# Patient Record
Sex: FEMALE | Race: WHITE | Employment: OTHER | URBAN - METROPOLITAN AREA
[De-identification: names, ages, dates, MRNs, and addresses within clinical notes are randomized per-mention and may not be internally consistent; named-entity substitution may affect disease eponyms.]

---

## 2018-02-15 ENCOUNTER — HOSPITAL ENCOUNTER (OUTPATIENT)
Dept: MAMMOGRAPHY | Age: 78
Discharge: HOME | End: 2018-02-15
Admitting: FAMILY MEDICINE

## 2018-02-15 DIAGNOSIS — Z12.31 ENCOUNTER FOR SCREENING MAMMOGRAM FOR BREAST CANCER: ICD-10-CM

## 2019-02-27 ENCOUNTER — HOSPITAL ENCOUNTER (OUTPATIENT)
Dept: MAMMOGRAPHY | Age: 79
Discharge: HOME | End: 2019-02-27
Payer: MEDICARE

## 2019-02-27 DIAGNOSIS — Z12.39 BREAST CANCER SCREENING: ICD-10-CM

## 2019-02-27 PROCEDURE — 77063 BREAST TOMOSYNTHESIS BI: CPT

## 2019-02-28 ENCOUNTER — TELEPHONE (OUTPATIENT)
Dept: CARDIOLOGY CLINIC | Age: 79
End: 2019-02-28

## 2019-02-28 ENCOUNTER — OFFICE VISIT (OUTPATIENT)
Dept: CARDIOLOGY CLINIC | Age: 79
End: 2019-02-28
Payer: MEDICARE

## 2019-02-28 VITALS
SYSTOLIC BLOOD PRESSURE: 120 MMHG | DIASTOLIC BLOOD PRESSURE: 82 MMHG | RESPIRATION RATE: 16 BRPM | WEIGHT: 160 LBS | BODY MASS INDEX: 26.66 KG/M2 | HEIGHT: 65 IN

## 2019-02-28 DIAGNOSIS — R94.31 ABNORMAL EKG: ICD-10-CM

## 2019-02-28 DIAGNOSIS — I10 ESSENTIAL HYPERTENSION: ICD-10-CM

## 2019-02-28 DIAGNOSIS — Z01.810 PRE-OPERATIVE CARDIOVASCULAR EXAMINATION: Primary | ICD-10-CM

## 2019-02-28 PROCEDURE — 99214 OFFICE O/P EST MOD 30 MIN: CPT | Performed by: INTERNAL MEDICINE

## 2019-02-28 RX ORDER — ESCITALOPRAM OXALATE 10 MG/1
10 TABLET ORAL DAILY
COMMUNITY

## 2019-02-28 RX ORDER — MIRTAZAPINE 15 MG/1
7.5 TABLET, FILM COATED ORAL NIGHTLY
Status: ON HOLD | COMMUNITY
End: 2022-10-27 | Stop reason: HOSPADM

## 2019-02-28 RX ORDER — LATANOPROST 50 UG/ML
1 SOLUTION/ DROPS OPHTHALMIC NIGHTLY
COMMUNITY

## 2019-03-15 ENCOUNTER — HOSPITAL ENCOUNTER (OUTPATIENT)
Dept: NON INVASIVE DIAGNOSTICS | Age: 79
Discharge: HOME | End: 2019-03-15
Payer: MEDICARE

## 2019-03-15 ENCOUNTER — HOSPITAL ENCOUNTER (OUTPATIENT)
Dept: CT IMAGING | Age: 79
Discharge: HOME | End: 2019-03-15
Payer: MEDICARE

## 2019-03-15 DIAGNOSIS — R94.31 ABNORMAL EKG: ICD-10-CM

## 2019-03-15 DIAGNOSIS — Z01.810 PRE-OPERATIVE CARDIOVASCULAR EXAMINATION: ICD-10-CM

## 2019-03-15 LAB
LV EF: 58 %
LVEF MODALITY: NORMAL

## 2019-03-15 PROCEDURE — 6360000004 HC RX CONTRAST MEDICATION: Performed by: FAMILY MEDICINE

## 2019-03-15 PROCEDURE — C8929 TTE W OR WO FOL WCON,DOPPLER: HCPCS

## 2019-03-15 PROCEDURE — 75571 CT HRT W/O DYE W/CA TEST: CPT

## 2019-03-15 RX ADMIN — PERFLUTREN 2.2 MG: 6.52 INJECTION, SUSPENSION INTRAVENOUS at 13:41

## 2019-03-18 ENCOUNTER — TELEPHONE (OUTPATIENT)
Dept: CARDIOLOGY CLINIC | Age: 79
End: 2019-03-18

## 2019-03-30 PROBLEM — Z01.810 PRE-OPERATIVE CARDIOVASCULAR EXAMINATION: Status: RESOLVED | Noted: 2019-02-28 | Resolved: 2019-03-30

## 2019-04-17 ENCOUNTER — OFFICE VISIT (OUTPATIENT)
Dept: CARDIOLOGY CLINIC | Age: 79
End: 2019-04-17
Payer: MEDICARE

## 2019-04-17 VITALS
SYSTOLIC BLOOD PRESSURE: 136 MMHG | WEIGHT: 171.2 LBS | BODY MASS INDEX: 28.52 KG/M2 | HEART RATE: 58 BPM | DIASTOLIC BLOOD PRESSURE: 64 MMHG | HEIGHT: 65 IN | OXYGEN SATURATION: 98 %

## 2019-04-17 DIAGNOSIS — I10 ESSENTIAL HYPERTENSION: Primary | ICD-10-CM

## 2019-04-17 DIAGNOSIS — E11.8 TYPE 2 DIABETES MELLITUS WITH COMPLICATION, UNSPECIFIED WHETHER LONG TERM INSULIN USE: ICD-10-CM

## 2019-04-17 DIAGNOSIS — E78.2 MIXED HYPERLIPIDEMIA: ICD-10-CM

## 2019-04-17 PROBLEM — E11.9 TYPE 2 DIABETES MELLITUS (HCC): Status: ACTIVE | Noted: 2019-04-17

## 2019-04-17 PROCEDURE — 99214 OFFICE O/P EST MOD 30 MIN: CPT | Performed by: INTERNAL MEDICINE

## 2019-04-17 NOTE — PATIENT INSTRUCTIONS
Plan:  1. The patient was seen for >25 minutes. >50% of the time was devoted to giving the patient detailed instructions instructions on addressing diet, regular exercise, weight control, smoking abstention, medication compliance, and stress minimization. The patient was provided written and verbal instructions regarding risk factor modification. 2. Start Aspirin 81 mg daily for preventative measures. Start after your surgery. 3. The patient's cardiac condition is not prohibitory to undergo surgery. The patient's cardiac risk is low based upon my evaluation and testing. Stable to proceed. 4. Follow up with me in 1 year.

## 2019-04-17 NOTE — PROGRESS NOTES
[address] [facility]   Cardiovascular Evaluation    PATIENT: Scar Conde  DATE: 2019  MRN: [id number]  CSN: [id number]  : 1940    Primary Care Doctor/Referring provider: Keshawn Cano MD    Reason for evaluation/Chief complaint:   Follow-up and Results      Subjective:    History of present illness on initial date of evaluation:   Scar Conde is a 70 y.o. patient who presents for follow up. Her echocardiogram from 3/15/19 showed an ejection fraction of 55-60% with mild valve abnormalities. Her CT calcium score was suggestive of mild plaque. Today she reports she has been feeling well. She has recovered from her g;aucoma surgery well and plans to undergo the same procedure, along with cataract on her left eye in the near future. Patient Active Problem List   Diagnosis    Abnormal EKG    Essential hypertension    Mixed hyperlipidemia    Type 2 diabetes mellitus ([facility] [address]         Cardiac Testing: I have reviewed the findings below. EKG:  ECHO:   STRESS TEST:  CATH:  BYPASS:  VASCULAR:    Past Medical History:   has a past medical history of Cancer ([facility] [address] Diabetes mellitus ([address] Hyperlipidemia, Hypertension, Seasonal allergies, and Thyroid disease. Surgical History:   has a past surgical history that includes Hysterectomy; Colonoscopy (); and Colonoscopy (). Social History:   reports that she has never smoked. She has never used smokeless tobacco. She reports that she does not drink alcohol. Family History:  No evidence for sudden cardiac death or premature CAD    Medications:  Reviewed and are listed in nursing record. and/or listed below  Outpatient Medications:  Prior to Admission medications    Medication Sig Start Date End Date Taking?  Authorizing Provider   mirtazapine (REMERON) 15 MG tablet Take 7.5 mg by mouth nightly   Yes Historical Provider, MD   escitalopram (LEXAPRO) 10 MG tablet Take 10 mg by mouth daily   Yes Historical Provider, MD   latanoprost (XALATAN) 0.005 % ophthalmic solution 1 drop nightly   Yes Historical Provider, MD   atenolol (TENORMIN) 100 MG tablet Take 100 mg by mouth daily   Yes Historical Provider, MD   montelukast (SINGULAIR) 10 MG tablet Take 10 mg by mouth nightly   Yes Historical Provider, MD   hydrochlorothiazide (HYDRODIURIL) 25 MG tablet Take 25 mg by mouth daily   Yes Historical Provider, MD   levothyroxine (SYNTHROID) 75 MCG tablet Take 75 mcg by mouth Daily   Yes Historical Provider, MD   simvastatin (ZOCOR) 10 MG tablet Take 10 mg by mouth nightly   Yes Historical Provider, MD   lisinopril (PRINIVIL;ZESTRIL) 40 MG tablet Take 40 mg by mouth nightly   Yes Historical Provider, MD   metFORMIN (GLUCOPHAGE) 1000 MG tablet Take 1,000 mg by mouth 2 times daily (with meals)   Yes Historical Provider, MD   Calcium Carbonate (CALTRATE 600 PO) Take by mouth 2 times daily   Yes Historical Provider, MD       In-patient schedule medications:        Infusion Medications: Allergies:  Z-ani [azithromycin]     Review of Systems:   All 14 point review of symptoms completed. Pertinent positives identified in the HPI, all other review of symptoms findings as below.      Review of Systems - History obtained from the patient  General ROS: negative for - chills, fever or night sweats  Psychological ROS: negative for - disorientation or hallucinations  Ophthalmic ROS: negative for - dry eyes, eye pain or loss of vision  ENT ROS: negative for - nasal discharge or sore throat  Allergy and Immunology ROS: negative for - hives or itchy/watery eyes  Hematological and Lymphatic ROS: negative for - jaundice or night sweats  Endocrine ROS: negative for - mood swings or temperature intolerance  Breast ROS: deferred  Respiratory ROS: negative for - hemoptysis or stridor  Cardiovascular ROS: negative for - chest pain, dyspnea on exertion or palpitations  Gastrointestinal ROS: no abdominal pain, change in bowel habits, or black or bloody stools  Genito-Urinary ROS: no dysuria, trouble voiding, or hematuria  Musculoskeletal ROS: negative for - gait disturbance, joint pain or joint stiffness  Neurological ROS: negative for - seizures or speech problems  Dermatological ROS: negative for - rash or skin lesion changes      Physical Examination:    /64   Pulse 58   Ht 5' 5\" (1.651 m)   Wt 171 lb 3.2 oz (77.7 kg)   SpO2 98%   BMI 28.49 kg/m²   /64   Pulse 58   Ht 5' 5\" (1.651 m)   Wt 171 lb 3.2 oz (77.7 kg)   SpO2 98%   BMI 28.49 kg/m²    Weight: 171 lb 3.2 oz (77.7 kg)     Wt Readings from Last 3 Encounters:   04/17/19 171 lb 3.2 oz (77.7 kg)   02/28/19 160 lb (72.6 kg)   08/30/16 159 lb (72.1 kg)     No intake or output data in the 24 hours ending 04/17/19 1040    General Appearance:  Alert, cooperative, no distress, appears stated age   Head:  Normocephalic, without obvious abnormality, atraumatic   Eyes:  PERRL, conjunctiva/corneas clear       Nose: Nares normal, no drainage or sinus tenderness   Throat: Lips, mucosa, and tongue normal   Neck: Supple, symmetrical, trachea midline, no adenopathy, thyroid: not enlarged, symmetric, no tenderness/mass/nodules, no carotid bruit or JVD       Lungs:   Clear to auscultation bilaterally, respirations unlabored   Chest Wall:  No tenderness or deformity   Heart:  Regular rhythm and normal rate; S1, S2 are normal; no murmur noted; no rub or gallop   Abdomen:   Soft, non-tender, bowel sounds active all four quadrants,  no masses, no organomegaly           Extremities: Extremities normal, atraumatic, no cyanosis or edema   Pulses: 2+ and symmetric   Skin: Skin color, texture, turgor normal, no rashes or lesions   Pysch: Normal mood and affect   Neurologic: Normal gross motor and sensory exam.         Labs  No results for input(s): WBC, HGB, HCT, MCV, PLT in the last 72 hours. No results for input(s): CREATININE, BUN, NA, K, CL, CO2 in the last 72 hours.   No results for input(s): INR, PROTIME in the last 72 hours. No results for input(s): TROPONINI in the last 72 hours. Invalid input(s): PRO-BNP  No results for input(s): CHOL, HDL in the last 72 hours. Invalid input(s): LDL, TG      Imaging:  I have reviewed the below testing personally and my interpretation is below. EKG:  CXR:      Assessment:  70 y.o. patient with:  Problem List Items Addressed This Visit     Essential hypertension - Primary    Mixed hyperlipidemia    Type 2 diabetes mellitus ([facility] [address]          Plan:  1. The patient was seen for >25 minutes. >50% of the time was devoted to giving the patient detailed instructions instructions on addressing diet, regular exercise, weight control, smoking abstention, medication compliance, and stress minimization. The patient was provided written and verbal instructions regarding risk factor modification. 2. Start Aspirin 81 mg daily for preventative measures. Start after your surgery. 3. The patient's cardiac condition is not prohibitory to undergo surgery. The patient's cardiac risk is low based upon my evaluation and testing. Stable to proceed. 4. Follow up with me in 1 year. This note was scribed in the presence of Dr. Kortney Giles MD by Lexi Mantilla RN.      I, Dr. Kortney Giles, personally performed the services described in this documentation, as scribed by the above signed scribe in my presence. It is both accurate and complete to my knowledge. I agree with the details independently gathered by the clinical support staff, while the remaining scribed note accurately describes my personal service to the patient. All questions and concerns were addressed to the patient/family. Alternatives to my treatment were discussed. The note was completed using EMR. Every effort was made to ensure accuracy; however, inadvertent computerized transcription errors may be present.     Kortney Giles MD, [id number], [facility], [address] [facility] office  [phone] Main

## 2020-07-05 ENCOUNTER — HOSPITAL ENCOUNTER (EMERGENCY)
Age: 80
Discharge: HOME | End: 2020-07-06
Attending: EMERGENCY MEDICINE
Payer: MEDICARE

## 2020-07-05 PROCEDURE — 99283 EMERGENCY DEPT VISIT LOW MDM: CPT

## 2020-07-05 RX ORDER — GLUCOSAMINE SULFATE 500 MG
CAPSULE ORAL
COMMUNITY

## 2020-07-06 ENCOUNTER — APPOINTMENT (OUTPATIENT)
Dept: GENERAL RADIOLOGY | Age: 80
End: 2020-07-06
Payer: MEDICARE

## 2020-07-06 VITALS
RESPIRATION RATE: 16 BRPM | DIASTOLIC BLOOD PRESSURE: 61 MMHG | SYSTOLIC BLOOD PRESSURE: 122 MMHG | OXYGEN SATURATION: 96 % | HEIGHT: 64 IN | HEART RATE: 76 BPM | WEIGHT: 165 LBS | BODY MASS INDEX: 28.17 KG/M2 | TEMPERATURE: 99 F

## 2020-07-06 PROCEDURE — 73610 X-RAY EXAM OF ANKLE: CPT

## 2020-07-06 NOTE — ED PROVIDER NOTES
CHIEF COMPLAINT  Leg Swelling (left foot ankle and foot swelling, denies any injury )      HISTORY OF PRESENT ILLNESS  Lino Pascual is a 68 y.o. female presents to the ED with left foot/ankle swelling, noticed a couple days ago, called PCP office and they told her to come get checked for blood clot, she is not currently on any blood thinners, no hx of DVT/PE in the past, no chest pain/SOB, no fevers, no known injury, she does report she tends to let her legs dangle in a chair at home, no significant pain, no numbness/weakness. No other complaints, modifying factors or associated symptoms. I have reviewed the following from the nursing documentation.     Past Medical History:   Diagnosis Date    Cancer ([facility] [address]     bladder    Diabetes mellitus ([facility] [address]     Hyperlipidemia     Hypertension     Seasonal allergies     Thyroid disease      Past Surgical History:   Procedure Laterality Date    COLONOSCOPY  2002    normal per pt    COLONOSCOPY  2016    normal    HYSTERECTOMY       Family History   Problem Relation Age of Onset    Heart Disease Mother     Cancer Father         lymphoma    Diabetes Father      Social History     Socioeconomic History    Marital status:      Spouse name: Not on file    Number of children: Not on file    Years of education: Not on file    Highest education level: Not on file   Occupational History    Not on file   Social Needs    Financial resource strain: Not on file    Food insecurity     Worry: Not on file     Inability: Not on file    Transportation needs     Medical: Not on file     Non-medical: Not on file   Tobacco Use    Smoking status: Never Smoker    Smokeless tobacco: Never Used   Substance and Sexual Activity    Alcohol use: No    Drug use: Not on file    Sexual activity: Not Currently   Lifestyle    Physical activity     Days per week: Not on file     Minutes per session: Not on file    Stress: Not on file   Relationships    Social connections Talks on phone: Not on file     Gets together: Not on file     Attends Mandaeism service: Not on file     Active member of club or organization: Not on file     Attends meetings of clubs or organizations: Not on file     Relationship status: Not on file    Intimate partner violence     Fear of current or ex partner: Not on file     Emotionally abused: Not on file     Physically abused: Not on file     Forced sexual activity: Not on file   Other Topics Concern    Not on file   Social History Narrative    Not on file     No current facility-administered medications for this encounter. Current Outpatient Medications   Medication Sig Dispense Refill    apixaban (ELIQUIS DVT/PE STARTER PACK) 5 MG TABS tablet Take 2 tablets by mouth 2 times daily for 7 days, THEN 1 tablet 2 times daily for 23 days. 74 tablet 0    Glucosamine 500 MG CAPS Take by mouth      mirtazapine (REMERON) 15 MG tablet Take 7.5 mg by mouth nightly      escitalopram (LEXAPRO) 10 MG tablet Take 10 mg by mouth daily      latanoprost (XALATAN) 0.005 % ophthalmic solution 1 drop nightly      atenolol (TENORMIN) 100 MG tablet Take 100 mg by mouth daily      montelukast (SINGULAIR) 10 MG tablet Take 10 mg by mouth nightly      hydrochlorothiazide (HYDRODIURIL) 25 MG tablet Take 25 mg by mouth daily      levothyroxine (SYNTHROID) 75 MCG tablet Take 75 mcg by mouth Daily      simvastatin (ZOCOR) 10 MG tablet Take 10 mg by mouth nightly      lisinopril (PRINIVIL;ZESTRIL) 40 MG tablet Take 40 mg by mouth nightly      metFORMIN (GLUCOPHAGE) 1000 MG tablet Take 1,000 mg by mouth 3 times daily       Calcium Carbonate (CALTRATE 600 PO) Take by mouth 2 times daily       Allergies   Allergen Reactions    Z-Alvarado [Azithromycin] Hives       REVIEW OF SYSTEMS  10 systems reviewed, pertinent positives per HPI otherwise noted to be negative.     PHYSICAL EXAM  BP (!) 159/63   Pulse 70   Temp 99 °F (37.2 °C) (Oral)   Resp 17   Ht 5' 4\" (1.626 m) Wt 165 lb (74.8 kg)   SpO2 97%   Breastfeeding No   BMI 28.32 kg/m²   GENERAL APPEARANCE: Awake and alert. Cooperative. No acute distress  HEAD: Normocephalic. Atraumatic. EYES: PERRL. EOM's grossly intact. ENT: Mucous membranes are moist.   NECK: Supple. No rigidity  HEART: RRR. No murmurs  LUNGS: Respirations nonlabored. Lungs are CTAB. ABDOMEN: Soft. Non-distended. Non-tender. No guarding or rebound. Normal bowel sounds. EXTREMITIES: no peripheral edema w/ exception of L ankle/foot, mild nonpitting edema compared to R, 2+ DP and PT pulses, no notable skin changes, <2sec cap refill in all digits, ankle and digital ROM wnl. Moves all extremities equally. All extremities neurovascularly intact. Able to bear weight w/o instability  SKIN: Warm and dry. No acute rashes. NEUROLOGICAL: Alert and oriented. No gross facial drooping. Strength 5/5, sensation intact. No truncal ataxia. Normal speech  PSYCHIATRIC: Normal mood and affect. RADIOLOGY  Xr Ankle Left (min 3 Views)    Result Date: 7/6/2020  Soft tissue swelling of the lower leg and ankle which may represent venous stasis contusion or cellulitis Clinical 7 please correlate clinically No acute osseous abnormality noted       ED COURSE/MDM  Patient seen and evaluated. Old records reviewed. Labs and imaging reviewed and results discussed with patient.    70yo F w/ atraumatic L ankle/foot swelling, low suspicion for DVT or acute arterial occlusion, no acute process on XR, no bony abnormality, no known injury, no current concern for PE, given script for eliquis and ultrasound order, but I felt the patient was rather low risk and patient stated she did not want to start blood thinner until she knew there was a clot, declined need for anything for pain, encouraged PCP f/u, patient reportedly has a good relationship w/ Dr. Kendy Lemus and will call as soon as they open tomorrow, extremity is neurovascularly intact, told to f/u about her BP being high as well, Strict return precautions given, all questions answered, will return if any worsening symptoms or new concerns, see AVS for further discharge information, patient verbalized understanding of plan, felt comfortable going home. Orders Placed This Encounter   Procedures    XR ANKLE LEFT (MIN 3 VIEWS)    VL Extremity Venous Left     Orders Placed This Encounter   Medications    apixaban (ELIQUIS DVT/PE STARTER PACK) 5 MG TABS tablet     Sig: Take 2 tablets by mouth 2 times daily for 7 days, THEN 1 tablet 2 times daily for 23 days. Dispense:  74 tablet     Refill:  0          Patient was given scripts for the following medications. I counseled patient how to take these medications. New Prescriptions    APIXABAN (ELIQUIS DVT/PE STARTER PACK) 5 MG TABS TABLET    Take 2 tablets by mouth 2 times daily for 7 days, THEN 1 tablet 2 times daily for 23 days. CLINICAL IMPRESSION  1. Left ankle swelling    2. Peripheral edema    3. Essential hypertension        Blood pressure (!) 159/63, pulse 70, temperature 99 °F (37.2 °C), temperature source Oral, resp. rate 17, height 5' 4\" (1.626 m), weight 165 lb (74.8 kg), SpO2 97 %, not currently breastfeeding. DISPOSITION  Torie Beard was discharged to home in stable condition.                    Enrique Polanco, DO  07/28/20 3641

## 2020-07-15 ENCOUNTER — OFFICE VISIT (OUTPATIENT)
Dept: CARDIOLOGY CLINIC | Age: 80
End: 2020-07-15
Payer: MEDICARE

## 2020-07-15 VITALS
BODY MASS INDEX: 29.19 KG/M2 | HEIGHT: 64 IN | DIASTOLIC BLOOD PRESSURE: 68 MMHG | OXYGEN SATURATION: 98 % | WEIGHT: 171 LBS | HEART RATE: 70 BPM | SYSTOLIC BLOOD PRESSURE: 136 MMHG

## 2020-07-15 PROBLEM — M25.472 LEFT ANKLE SWELLING: Status: ACTIVE | Noted: 2020-07-15

## 2020-07-15 PROCEDURE — 99214 OFFICE O/P EST MOD 30 MIN: CPT | Performed by: INTERNAL MEDICINE

## 2020-07-15 RX ORDER — ASPIRIN 81 MG/1
81 TABLET ORAL DAILY
Status: ON HOLD | COMMUNITY
End: 2022-10-27 | Stop reason: HOSPADM

## 2020-07-15 NOTE — PROGRESS NOTES
[address]  [facility]   Cardiovascular Evaluation    PATIENT: Berkley Fonseca  DATE: 7/15/2020  MRN: <[id number]>  CSN: [id number]  : 1940    Primary Care Doctor/Referring provider: Lu Jackson MD    Reason for evaluation/Chief complaint:   1 Year Follow Up; Hypertension; Hyperlipidemia; and Edema      Subjective:    History of present illness on initial date of evaluation:   Berkley Fonseca is a 68 y.o. patient who presents for follow up. She presented to the emergency room on 20 with left leg swelling. Her left ankle was noted to be swollen at that time. An xray was done and showed soft tissue swelling suggestive of venous stasis. She was discharged home on Eliquis. Today she reports that she has not yet started her Eliquis as she wanted to obtain the ordered ultrasound first. She has not yet had this done. This was done at [address] [place] [address], negative for any blood clots. She does not recall any injury to her ankle. She remains active, mowing and caring for her yard. She tolerates this well. Patient Active Problem List   Diagnosis    Abnormal EKG    Essential hypertension    Mixed hyperlipidemia    Type 2 diabetes mellitus ([facility] [address]    Left ankle swelling, not cardiac in nature         Cardiac Testing: I have reviewed the findings below. EKG:  ECHO:   STRESS TEST:  CATH:  BYPASS:  VASCULAR:    Past Medical History:   has a past medical history of Cancer ([facility] [address] Diabetes mellitus ([facility] [address] Hyperlipidemia, Hypertension, Seasonal allergies, and Thyroid disease. Surgical History:   has a past surgical history that includes Hysterectomy; Colonoscopy (); and Colonoscopy (). Social History:   reports that she has never smoked. She has never used smokeless tobacco. She reports that she does not drink alcohol. Family History:  No evidence for sudden cardiac death or premature CAD    Medications:  Reviewed and are listed in nursing record.  and/or listed below  Outpatient ROS: negative for - hives or itchy/watery eyes  Hematological and Lymphatic ROS: negative for - jaundice or night sweats  Endocrine ROS: negative for - mood swings or temperature intolerance  Breast ROS: deferred  Respiratory ROS: negative for - hemoptysis or stridor  Cardiovascular ROS: negative for - chest pain, dyspnea on exertion or palpitations  Gastrointestinal ROS: no abdominal pain, change in bowel habits, or black or bloody stools  Genito-Urinary ROS: no dysuria, trouble voiding, or hematuria  Musculoskeletal ROS: negative for - gait disturbance, joint pain or joint stiffness  Neurological ROS: negative for - seizures or speech problems  Dermatological ROS: negative for - rash or skin lesion changes      Physical Examination:    /68   Pulse 70   Ht 5' 4\" (1.626 m)   Wt 171 lb (77.6 kg)   SpO2 98%   BMI 29.35 kg/m²   /68   Pulse 70   Ht 5' 4\" (1.626 m)   Wt 171 lb (77.6 kg)   SpO2 98%   BMI 29.35 kg/m²    Weight: 171 lb (77.6 kg)     Wt Readings from Last 3 Encounters:   07/15/20 171 lb (77.6 kg)   07/05/20 165 lb (74.8 kg)   04/17/19 171 lb 3.2 oz (77.7 kg)     No intake or output data in the 24 hours ending 07/15/20 1512    General Appearance:  Alert, cooperative, no distress, appears stated age   Head:  Normocephalic, without obvious abnormality, atraumatic   Eyes:  PERRL, conjunctiva/corneas clear       Nose: Nares normal, no drainage or sinus tenderness   Throat: Lips, mucosa, and tongue normal   Neck: Supple, symmetrical, trachea midline, no adenopathy, thyroid: not enlarged, symmetric, no tenderness/mass/nodules, no carotid bruit or JVD       Lungs:   Clear to auscultation bilaterally, respirations unlabored   Chest Wall:  No tenderness or deformity   Heart:  Regular rhythm and normal rate; S1, S2 are normal; no murmur noted; no rub or gallop   Abdomen:   Soft, non-tender, bowel sounds active all four quadrants,  no masses, no organomegaly           Extremities: Extremities normal, atraumatic, no cyanosis, Trace left ankle edema    Pulses: 2+ and symmetric   Skin: Skin color, texture, turgor normal, no rashes or lesions   Pysch: Normal mood and affect   Neurologic: Normal gross motor and sensory exam.         Labs  No results for input(s): WBC, HGB, HCT, MCV, PLT in the last 72 hours. No results for input(s): CREATININE, BUN, NA, K, CL, CO2 in the last 72 hours. No results for input(s): INR, PROTIME in the last 72 hours. No results for input(s): TROPONINI in the last 72 hours. Invalid input(s): PRO-BNP  No results for input(s): CHOL, HDL in the last 72 hours. Invalid input(s): LDL, TG      Imaging:  I have reviewed the below testing personally and my interpretation is below. EKG:  CXR:      Assessment:  68 y.o. patient with:  Problem List Items Addressed This Visit     Essential hypertension    Mixed hyperlipidemia - Primary    Relevant Medications    aspirin 81 MG EC tablet    Left ankle swelling, not cardiac in nature          Plan:  1. No need to start Eliquis as venous doppler was negative. .   2. Continue all other medications as prescribed.   ~asa and statin therapy   3. Wear compression stockings for ankle swelling. 4. Follow up with me in 1 year. This note was scribed in the presence of Dr. Kellie Smallwood MD by Kami Osullivan RN.      I, Dr. Kellie Smallwood, personally performed the services described in this documentation, as scribed by the above signed scribe in my presence. It is both accurate and complete to my knowledge. I agree with the details independently gathered by the clinical support staff, while the remaining scribed note accurately describes my personal service to the patient. All questions and concerns were addressed to the patient/family. Alternatives to my treatment were discussed. The note was completed using EMR. Every effort was made to ensure accuracy; however, inadvertent computerized transcription errors may be present.     Neri

## 2020-07-15 NOTE — PATIENT INSTRUCTIONS
Plan:  1. No need to start Eliquis. 2. Continue all other medications as prescribed. 3. Wear compression stockings for ankle swelling. 4. Follow up with me in 1 year.

## 2020-07-15 NOTE — LETTER
Allergy and Immunology ROS: negative for - hives or itchy/watery eyes  Hematological and Lymphatic ROS: negative for - jaundice or night sweats  Endocrine ROS: negative for - mood swings or temperature intolerance  Breast ROS: deferred  Respiratory ROS: negative for - hemoptysis or stridor  Cardiovascular ROS: negative for - chest pain, dyspnea on exertion or palpitations  Gastrointestinal ROS: no abdominal pain, change in bowel habits, or black or bloody stools  Genito-Urinary ROS: no dysuria, trouble voiding, or hematuria  Musculoskeletal ROS: negative for - gait disturbance, joint pain or joint stiffness  Neurological ROS: negative for - seizures or speech problems  Dermatological ROS: negative for - rash or skin lesion changes      Physical Examination:    /68   Pulse 70   Ht 5' 4\" (1.626 m)   Wt 171 lb (77.6 kg)   SpO2 98%   BMI 29.35 kg/m²   /68   Pulse 70   Ht 5' 4\" (1.626 m)   Wt 171 lb (77.6 kg)   SpO2 98%   BMI 29.35 kg/m²    Weight: 171 lb (77.6 kg)     Wt Readings from Last 3 Encounters:   07/15/20 171 lb (77.6 kg)   07/05/20 165 lb (74.8 kg)   04/17/19 171 lb 3.2 oz (77.7 kg)     No intake or output data in the 24 hours ending 07/15/20 1512    General Appearance:  Alert, cooperative, no distress, appears stated age   Head:  Normocephalic, without obvious abnormality, atraumatic   Eyes:  PERRL, conjunctiva/corneas clear       Nose: Nares normal, no drainage or sinus tenderness   Throat: Lips, mucosa, and tongue normal   Neck: Supple, symmetrical, trachea midline, no adenopathy, thyroid: not enlarged, symmetric, no tenderness/mass/nodules, no carotid bruit or JVD       Lungs:   Clear to auscultation bilaterally, respirations unlabored   Chest Wall:  No tenderness or deformity   Heart:  Regular rhythm and normal rate; S1, S2 are normal; no murmur noted; no rub or gallop   Abdomen:   Soft, non-tender, bowel sounds active all four quadrants,  no masses, no organomegaly Extremities: Extremities normal, atraumatic, no cyanosis, Trace left ankle edema    Pulses: 2+ and symmetric   Skin: Skin color, texture, turgor normal, no rashes or lesions   Pysch: Normal mood and affect   Neurologic: Normal gross motor and sensory exam.         Labs  No results for input(s): WBC, HGB, HCT, MCV, PLT in the last 72 hours. No results for input(s): CREATININE, BUN, NA, K, CL, CO2 in the last 72 hours. No results for input(s): INR, PROTIME in the last 72 hours. No results for input(s): TROPONINI in the last 72 hours. Invalid input(s): PRO-BNP  No results for input(s): CHOL, HDL in the last 72 hours. Invalid input(s): LDL, TG      Imaging:  I have reviewed the below testing personally and my interpretation is below. EKG:  CXR:      Assessment:  68 y.o. patient with:  Problem List Items Addressed This Visit     Essential hypertension    Mixed hyperlipidemia - Primary    Relevant Medications    aspirin 81 MG EC tablet    Left ankle swelling, not cardiac in nature          Plan:  1. No need to start Eliquis as venous doppler was negative. .   2. Continue all other medications as prescribed.   ~asa and statin therapy   3. Wear compression stockings for ankle swelling. 4. Follow up with me in 1 year. This note was scribed in the presence of Dr. Katya Grullon MD by Tyesha Best RN.      I, Dr. Katya Grullon, personally performed the services described in this documentation, as scribed by the above signed scribe in my presence. It is both accurate and complete to my knowledge. I agree with the details independently gathered by the clinical support staff, while the remaining scribed note accurately describes my personal service to the patient. All questions and concerns were addressed to the patient/family. Alternatives to my treatment were discussed. The note was completed using EMR.  Every effort was made to ensure accuracy; however, inadvertent

## 2020-07-28 ENCOUNTER — HOSPITAL ENCOUNTER (OUTPATIENT)
Dept: MAMMOGRAPHY | Age: 80
Discharge: HOME | End: 2020-07-28
Payer: MEDICARE

## 2020-07-28 PROCEDURE — 77063 BREAST TOMOSYNTHESIS BI: CPT

## 2021-08-11 ENCOUNTER — HOSPITAL ENCOUNTER (OUTPATIENT)
Dept: MAMMOGRAPHY | Age: 81
Discharge: HOME | End: 2021-08-11
Payer: MEDICARE

## 2021-08-11 DIAGNOSIS — Z12.31 BREAST CANCER SCREENING BY MAMMOGRAM: ICD-10-CM

## 2021-08-11 PROCEDURE — 77063 BREAST TOMOSYNTHESIS BI: CPT

## 2021-08-18 ENCOUNTER — OFFICE VISIT (OUTPATIENT)
Dept: CARDIOLOGY CLINIC | Age: 81
End: 2021-08-18
Payer: MEDICARE

## 2021-08-18 VITALS
HEART RATE: 67 BPM | OXYGEN SATURATION: 98 % | HEIGHT: 65 IN | DIASTOLIC BLOOD PRESSURE: 70 MMHG | SYSTOLIC BLOOD PRESSURE: 178 MMHG | BODY MASS INDEX: 28.16 KG/M2 | WEIGHT: 169 LBS

## 2021-08-18 DIAGNOSIS — E78.2 MIXED HYPERLIPIDEMIA: ICD-10-CM

## 2021-08-18 DIAGNOSIS — I10 ESSENTIAL HYPERTENSION: Primary | ICD-10-CM

## 2021-08-18 DIAGNOSIS — R94.31 ABNORMAL EKG: ICD-10-CM

## 2021-08-18 PROCEDURE — 99214 OFFICE O/P EST MOD 30 MIN: CPT | Performed by: INTERNAL MEDICINE

## 2021-08-18 RX ORDER — GLIPIZIDE 2.5 MG/1
2.5 TABLET, EXTENDED RELEASE ORAL DAILY
COMMUNITY

## 2021-08-18 NOTE — PATIENT INSTRUCTIONS
Plan:  1. Check Blood Pressure and Keep a Log for one month  ~ (120-130/ 80's) Goal Blood Pressure  ~ If log consistently over this value, contact cardiology office for proper intervention. 2. Patient given detailed instructions on addressing diet, regular exercise, and weight control. The patient was provided written and verbal instructions regarding risk factor modification. 3. No changes to medications at this time. 4. Follow up in one year.

## 2021-08-18 NOTE — LETTER
[facility] [place] [address]  Phone: [phone]  Fax: [phone]    Leah Carrion MD    August 18, 2021     Mag[place] Piper, [address] ,[facility] [address] [facility] [address]    Patient: Savita Hansen   MR Number: <[id number]>   YOB: 1940   Date of Visit: 8/18/2021       Dear Lopez BARAKAT:    Thank you for referring Roxy Bates to me for evaluation/treatment. Below are the relevant portions of my assessment and plan of care. If you have questions, please do not hesitate to call me. I look forward to following July Medrano along with you.     Sincerely,      Leah Carrion MD

## 2021-08-18 NOTE — PROGRESS NOTES
[address] [facility]   Cardiovascular Evaluation    PATIENT: Jeffrey Alvarado  DATE: 2021  MRN: <[id number]>  CSN: [id number]  : 1940    Primary Care Doctor/Referring provider: J Luis Babin MD    Reason for evaluation/Chief complaint:   1 Year Follow Up and Hypertension      Subjective:    History of present illness on initial date of evaluation:   Jeffrey Alvarado is a 80 y.o. patient who presents for follow up with a medical history including hypertension and hyperlipidemia. She presented to the emergency room on 20 with left leg swelling. Her left ankle was noted to be swollen at that time. An xray was done and showed soft tissue swelling suggestive of venous stasis. She was discharged home on Eliquis. Venous Doppler completed negative for blood clot, Eliquis was discontinued. Today she states she blood pressure at home 140/ 70's. She states does not regularly check blood pressures at home. Patient currently denies any weight gain, edema, palpitations, chest pain, shortness of breath, dizziness, and syncope. She is taking all medications as prescribed, tolerating well. Patient Active Problem List   Diagnosis    Abnormal EKG    Essential hypertension    Mixed hyperlipidemia    Type 2 diabetes mellitus ([address]    Left ankle swelling, not cardiac in nature         Cardiac Testing: I have reviewed the findings below. EKG:  ECHO:   STRESS TEST:  CATH:  BYPASS:  VASCULAR:    Past Medical History:   has a past medical history of Cancer ([address] Diabetes mellitus ([facility] [address] Hyperlipidemia, Hypertension, Seasonal allergies, and Thyroid disease. Surgical History:   has a past surgical history that includes Hysterectomy; Colonoscopy (); Colonoscopy (); and Ovary removal.     Social History:   reports that she has never smoked. She has never used smokeless tobacco. She reports that she does not drink alcohol.      Family History:  No evidence for sudden cardiac death or premature CAD    Medications:  Reviewed and are listed in nursing record. and/or listed below  Outpatient Medications:  Prior to Admission medications    Medication Sig Start Date End Date Taking? Authorizing Provider   glipiZIDE (GLUCOTROL XL) 2.5 MG extended release tablet Take 2.5 mg by mouth daily   Yes Historical Provider, MD   aspirin 81 MG EC tablet Take 81 mg by mouth daily   Yes Historical Provider, MD   Glucosamine 500 MG CAPS Take by mouth   Yes Historical Provider, MD   escitalopram (LEXAPRO) 10 MG tablet Take 10 mg by mouth daily   Yes Historical Provider, MD   latanoprost (XALATAN) 0.005 % ophthalmic solution 1 drop nightly   Yes Historical Provider, MD   atenolol (TENORMIN) 100 MG tablet Take 100 mg by mouth daily   Yes Historical Provider, MD   montelukast (SINGULAIR) 10 MG tablet Take 10 mg by mouth nightly   Yes Historical Provider, MD   hydrochlorothiazide (HYDRODIURIL) 25 MG tablet Take 25 mg by mouth daily   Yes Historical Provider, MD   levothyroxine (SYNTHROID) 75 MCG tablet Take 100 mcg by mouth Daily    Yes Historical Provider, MD   simvastatin (ZOCOR) 10 MG tablet Take 10 mg by mouth nightly   Yes Historical Provider, MD   lisinopril (PRINIVIL;ZESTRIL) 40 MG tablet Take 40 mg by mouth nightly   Yes Historical Provider, MD   metFORMIN (GLUCOPHAGE) 1000 MG tablet Take 1,000 mg by mouth 3 times daily    Yes Historical Provider, MD   Calcium Carbonate (CALTRATE 600 PO) Take by mouth 2 times daily   Yes Historical Provider, MD   mirtazapine (REMERON) 15 MG tablet Take 7.5 mg by mouth nightly  Patient not taking: Reported on 8/18/2021    Historical Provider, MD       In-patient schedule medications:        Infusion Medications: Allergies:  Z-ani [azithromycin]     Review of Systems:   All 14 point review of symptoms completed. Pertinent positives identified in the HPI, all other review of symptoms findings as below.      Review of Systems - History obtained from the patient  General ROS: negative for - chills, fever or night sweats  Psychological ROS: negative for - disorientation or hallucinations  Ophthalmic ROS: negative for - dry eyes, eye pain or loss of vision  ENT ROS: negative for - nasal discharge or sore throat  Allergy and Immunology ROS: negative for - hives or itchy/watery eyes  Hematological and Lymphatic ROS: negative for - jaundice or night sweats  Endocrine ROS: negative for - mood swings or temperature intolerance  Breast ROS: deferred  Respiratory ROS: negative for - hemoptysis or stridor  Cardiovascular ROS: negative for - chest pain, dyspnea on exertion or palpitations  Gastrointestinal ROS: no abdominal pain, change in bowel habits, or black or bloody stools  Genito-Urinary ROS: no dysuria, trouble voiding, or hematuria  Musculoskeletal ROS: negative for - gait disturbance, joint pain or joint stiffness  Neurological ROS: negative for - seizures or speech problems  Dermatological ROS: negative for - rash or skin lesion changes      Physical Examination:    BP (!) 178/70   Pulse 67   Ht 5' 4.5\" (1.638 m)   Wt 169 lb (76.7 kg)   SpO2 98%   BMI 28.56 kg/m²   BP (!) 178/70   Pulse 67   Ht 5' 4.5\" (1.638 m)   Wt 169 lb (76.7 kg)   SpO2 98%   BMI 28.56 kg/m²    Weight: 169 lb (76.7 kg)     Wt Readings from Last 3 Encounters:   08/18/21 169 lb (76.7 kg)   07/15/20 171 lb (77.6 kg)   07/05/20 165 lb (74.8 kg)     No intake or output data in the 24 hours ending 08/18/21 1358    General Appearance:  Alert, cooperative, no distress, appears stated age   Head:  Normocephalic, without obvious abnormality, atraumatic   Eyes:  PERRL, conjunctiva/corneas clear       Nose: Nares normal, no drainage or sinus tenderness   Throat: Lips, mucosa, and tongue normal   Neck: Supple, symmetrical, trachea midline, no adenopathy, thyroid: not enlarged, symmetric, no tenderness/mass/nodules, no carotid bruit or JVD       Lungs:   Clear to auscultation bilaterally, respirations unlabored   Chest Wall:  No tenderness or deformity   Heart:  Regular rhythm and normal rate; S1, S2 are normal; no murmur noted; no rub or gallop   Abdomen:   Soft, non-tender, bowel sounds active all four quadrants,  no masses, no organomegaly           Extremities: Extremities normal, atraumatic, no cyanosis, Trace left ankle edema    Pulses: 2+ and symmetric   Skin: Skin color, texture, turgor normal, no rashes or lesions   Pysch: Normal mood and affect   Neurologic: Normal gross motor and sensory exam.         Labs  No results for input(s): WBC, HGB, HCT, MCV, PLT in the last 72 hours. No results for input(s): CREATININE, BUN, NA, K, CL, CO2 in the last 72 hours. No results for input(s): INR, PROTIME in the last 72 hours. No results for input(s): TROPONINI in the last 72 hours. Invalid input(s): PRO-BNP  No results for input(s): CHOL, HDL in the last 72 hours. Invalid input(s): LDL, TG      Imaging:  I have reviewed the below testing personally and my interpretation is below. EKG:  CXR:      Assessment:  80 y.o. patient with:  Problem List Items Addressed This Visit     Abnormal EKG    Essential hypertension - Primary    Mixed hyperlipidemia          Plan:  1. Check Blood Pressure and Keep a Log for one month  ~ (120-130/ 80's) Goal Blood Pressure  ~ If log consistently over this value, contact cardiology office for proper intervention. 2. Patient given detailed instructions on addressing diet, regular exercise, and weight control. The patient was provided written and verbal instructions regarding risk factor modification. 3. No changes to medications at this time. 4. Follow up in one year. Scribe's attestation: This note was scribed in the presence of Herman Tavarez by Sheri Jackson RN      I, Dr. Florence Alanis, personally performed the services described in this documentation, as scribed by the above signed scribe in my presence. It is both accurate and complete to my knowledge.  I agree with the details independently gathered by the clinical support staff, while the remaining scribed note accurately describes my personal service to the patient. Medical Decision Making: The following items were considered in medical decision making:  Independent review of images  Review / order clinical lab tests  Review / order radiology tests  Decision to obtain old records  Review and summation of old records as accessed through [facility] (a summary of my findings in these old records)      All questions and concerns were addressed to the patient/family. Alternatives to my treatment were discussed. The note was completed using EMR. Every effort was made to ensure accuracy; however, inadvertent computerized transcription errors may be present.     Angely Romero MD, [address], [facility]  [phone] [facility]  [phone] [facility]  8/18/2021  1:58 PM

## 2022-03-06 ENCOUNTER — APPOINTMENT (OUTPATIENT)
Dept: GENERAL RADIOLOGY | Age: 82
End: 2022-03-06
Payer: MEDICARE

## 2022-03-06 ENCOUNTER — APPOINTMENT (OUTPATIENT)
Dept: CT IMAGING | Age: 82
End: 2022-03-06
Payer: MEDICARE

## 2022-03-06 ENCOUNTER — HOSPITAL ENCOUNTER (EMERGENCY)
Age: 82
Discharge: HOME | End: 2022-03-06
Payer: MEDICARE

## 2022-03-06 VITALS
SYSTOLIC BLOOD PRESSURE: 136 MMHG | HEART RATE: 69 BPM | TEMPERATURE: 98.2 F | RESPIRATION RATE: 16 BRPM | DIASTOLIC BLOOD PRESSURE: 53 MMHG | OXYGEN SATURATION: 98 %

## 2022-03-06 DIAGNOSIS — R11.0 NAUSEA: ICD-10-CM

## 2022-03-06 DIAGNOSIS — R19.00 ABDOMINAL MASS, UNSPECIFIED ABDOMINAL LOCATION: Primary | ICD-10-CM

## 2022-03-06 LAB
A/G RATIO: 2.2 (ref 1.1–2.2)
ALBUMIN SERPL-MCNC: 4.6 G/DL (ref 3.4–5)
ALP BLD-CCNC: 51 U/L (ref 40–129)
ALT SERPL-CCNC: 16 U/L (ref 10–40)
ANION GAP SERPL CALCULATED.3IONS-SCNC: 15 MMOL/L (ref 3–16)
AST SERPL-CCNC: 14 U/L (ref 15–37)
BACTERIA: ABNORMAL /HPF
BASOPHILS ABSOLUTE: 0.1 K/UL (ref 0–0.2)
BASOPHILS RELATIVE PERCENT: 0.5 %
BILIRUB SERPL-MCNC: 0.5 MG/DL (ref 0–1)
BILIRUBIN URINE: NEGATIVE
BLOOD, URINE: NEGATIVE
BUN BLDV-MCNC: 18 MG/DL (ref 7–20)
CALCIUM SERPL-MCNC: 10.2 MG/DL (ref 8.3–10.6)
CHLORIDE BLD-SCNC: 93 MMOL/L (ref 99–110)
CLARITY: CLEAR
CO2: 24 MMOL/L (ref 21–32)
COLOR: YELLOW
CREAT SERPL-MCNC: 0.8 MG/DL (ref 0.6–1.2)
EOSINOPHILS ABSOLUTE: 0.3 K/UL (ref 0–0.6)
EOSINOPHILS RELATIVE PERCENT: 2 %
EPITHELIAL CELLS, UA: ABNORMAL /HPF (ref 0–5)
GFR AFRICAN AMERICAN: >60
GFR NON-AFRICAN AMERICAN: >60
GLUCOSE BLD-MCNC: 70 MG/DL (ref 70–99)
GLUCOSE URINE: NEGATIVE MG/DL
HCT VFR BLD CALC: 36.5 % (ref 36–48)
HEMOGLOBIN: 11.6 G/DL (ref 12–16)
KETONES, URINE: NEGATIVE MG/DL
LEUKOCYTE ESTERASE, URINE: ABNORMAL
LIPASE: 71 U/L (ref 13–60)
LYMPHOCYTES ABSOLUTE: 2 K/UL (ref 1–5.1)
LYMPHOCYTES RELATIVE PERCENT: 14.1 %
MCH RBC QN AUTO: 23.6 PG (ref 26–34)
MCHC RBC AUTO-ENTMCNC: 31.9 G/DL (ref 31–36)
MCV RBC AUTO: 73.9 FL (ref 80–100)
MICROSCOPIC EXAMINATION: YES
MONOCYTES ABSOLUTE: 1.5 K/UL (ref 0–1.3)
MONOCYTES RELATIVE PERCENT: 10.7 %
NEUTROPHILS ABSOLUTE: 10.1 K/UL (ref 1.7–7.7)
NEUTROPHILS RELATIVE PERCENT: 72.7 %
NITRITE, URINE: NEGATIVE
PDW BLD-RTO: 17.5 % (ref 12.4–15.4)
PH UA: 6 (ref 5–8)
PLATELET # BLD: 348 K/UL (ref 135–450)
PMV BLD AUTO: 7.4 FL (ref 5–10.5)
POTASSIUM REFLEX MAGNESIUM: 3.9 MMOL/L (ref 3.5–5.1)
PROTEIN UA: NEGATIVE MG/DL
RBC # BLD: 4.94 M/UL (ref 4–5.2)
RBC UA: ABNORMAL /HPF (ref 0–4)
RENAL EPITHELIAL, UA: ABNORMAL /HPF (ref 0–1)
SODIUM BLD-SCNC: 132 MMOL/L (ref 136–145)
SPECIFIC GRAVITY UA: 1.01 (ref 1–1.03)
TOTAL PROTEIN: 6.7 G/DL (ref 6.4–8.2)
TROPONIN: <0.01 NG/ML
URINE REFLEX TO CULTURE: ABNORMAL
URINE TYPE: ABNORMAL
UROBILINOGEN, URINE: 0.2 E.U./DL
WBC # BLD: 13.9 K/UL (ref 4–11)
WBC UA: ABNORMAL /HPF (ref 0–5)

## 2022-03-06 PROCEDURE — 2500000003 HC RX 250 WO HCPCS: Performed by: NURSE PRACTITIONER

## 2022-03-06 PROCEDURE — 96375 TX/PRO/DX INJ NEW DRUG ADDON: CPT

## 2022-03-06 PROCEDURE — 99284 EMERGENCY DEPT VISIT MOD MDM: CPT

## 2022-03-06 PROCEDURE — 84484 ASSAY OF TROPONIN QUANT: CPT

## 2022-03-06 PROCEDURE — 74177 CT ABD & PELVIS W/CONTRAST: CPT

## 2022-03-06 PROCEDURE — 81001 URINALYSIS AUTO W/SCOPE: CPT

## 2022-03-06 PROCEDURE — 93005 ELECTROCARDIOGRAM TRACING: CPT | Performed by: NURSE PRACTITIONER

## 2022-03-06 PROCEDURE — 80053 COMPREHEN METABOLIC PANEL: CPT

## 2022-03-06 PROCEDURE — 71046 X-RAY EXAM CHEST 2 VIEWS: CPT

## 2022-03-06 PROCEDURE — 96374 THER/PROPH/DIAG INJ IV PUSH: CPT

## 2022-03-06 PROCEDURE — 2580000003 HC RX 258: Performed by: NURSE PRACTITIONER

## 2022-03-06 PROCEDURE — 6360000002 HC RX W HCPCS: Performed by: NURSE PRACTITIONER

## 2022-03-06 PROCEDURE — 6360000004 HC RX CONTRAST MEDICATION: Performed by: NURSE PRACTITIONER

## 2022-03-06 PROCEDURE — 83690 ASSAY OF LIPASE: CPT

## 2022-03-06 PROCEDURE — 85025 COMPLETE CBC W/AUTO DIFF WBC: CPT

## 2022-03-06 RX ORDER — LORAZEPAM 0.5 MG/1
0.5 TABLET ORAL EVERY 8 HOURS PRN
Qty: 20 TABLET | Refills: 0 | Status: SHIPPED | OUTPATIENT
Start: 2022-03-06 | End: 2022-04-05

## 2022-03-06 RX ORDER — ONDANSETRON 2 MG/ML
4 INJECTION INTRAMUSCULAR; INTRAVENOUS ONCE
Status: COMPLETED | OUTPATIENT
Start: 2022-03-06 | End: 2022-03-06

## 2022-03-06 RX ORDER — ONDANSETRON 4 MG/1
4 TABLET, ORALLY DISINTEGRATING ORAL EVERY 8 HOURS PRN
Qty: 20 TABLET | Refills: 0 | Status: SHIPPED | OUTPATIENT
Start: 2022-03-06

## 2022-03-06 RX ORDER — 0.9 % SODIUM CHLORIDE 0.9 %
1000 INTRAVENOUS SOLUTION INTRAVENOUS ONCE
Status: COMPLETED | OUTPATIENT
Start: 2022-03-06 | End: 2022-03-06

## 2022-03-06 RX ADMIN — IOPAMIDOL 75 ML: 755 INJECTION, SOLUTION INTRAVENOUS at 19:32

## 2022-03-06 RX ADMIN — FAMOTIDINE 20 MG: 10 INJECTION, SOLUTION INTRAVENOUS at 18:34

## 2022-03-06 RX ADMIN — SODIUM CHLORIDE 1000 ML: 9 INJECTION, SOLUTION INTRAVENOUS at 18:33

## 2022-03-06 RX ADMIN — ONDANSETRON 4 MG: 2 INJECTION INTRAMUSCULAR; INTRAVENOUS at 18:34

## 2022-03-06 NOTE — ED PROVIDER NOTES
[facility] Emergency Department    CHIEF COMPLAINT  Nausea (pt has felt shakey and nauseated since wednesday last week. pt is scheduled for eye surgery this wednesday and is concerned. pt denies any other symptoms. )      HISTORY OF PRESENT ILLNESS  Chris Tse is a 80 y.o. female with a pertinent history of diabetes, hypertension, hyperlipidemia, thyroid disease, bladder cancer who presents to the ED complaining of generalized fatigue, nausea, shakiness. Patient reports symptoms initially started Wednesday he denies associated abdominal pain, emesis, diarrhea, constipation, frequency, urgency, dysuria, hematuria, gastric reflux, chest pain shortness of breath, cough, sore throat, fever, chills, body aches, flank pain. Patient states that she thought her symptoms that had resolved on Friday when she went to her primary care physician's office for physical for an upcoming eye procedure patient think to mention it. Patient reports shortly after leaving her appointment symptoms returned and have not gone away. Patient reports that her PCP did start her back on Lexapro for anxiety and unable to sleep she did start taking this on Friday otherwise no new medications. No sick contacts or recent travel. No other complaints, modifying factors or associated symptoms. Nursing notes reviewed. Past Medical History:   Diagnosis Date    Cancer [facility])     bladder    Diabetes mellitus ([address]     Hyperlipidemia     Hypertension     Seasonal allergies     Thyroid disease      Past Surgical History:   Procedure Laterality Date    COLONOSCOPY  2002    normal per pt    COLONOSCOPY  2016    normal    HYSTERECTOMY      OVARY REMOVAL       Family History   Problem Relation Age of Onset    Heart Disease Mother     Cancer Father         lymphoma    Diabetes Father      Social History     Socioeconomic History    Marital status:       Spouse name: Not on file    Number of children: Not on file    Years of education: Not on file    Highest education level: Not on file   Occupational History    Not on file   Tobacco Use    Smoking status: Never Smoker    Smokeless tobacco: Never Used   Vaping Use    Vaping Use: Never used   Substance and Sexual Activity    Alcohol use: No    Drug use: Not on file    Sexual activity: Not Currently   Other Topics Concern    Not on file   Social History Narrative    Not on file     Social Determinants of Health     Financial Resource Strain:     Difficulty of Paying Living Expenses: Not on file   Food Insecurity:     Worried About Running Out of Food in the Last Year: Not on file    Ana of Food in the Last Year: Not on file   Transportation Needs:     Lack of Transportation (Medical): Not on file    Lack of Transportation (Non-Medical): Not on file   Physical Activity:     Days of Exercise per Week: Not on file    Minutes of Exercise per Session: Not on file   Stress:     Feeling of Stress : Not on file   Social Connections:     Frequency of Communication with Friends and Family: Not on file    Frequency of Social Gatherings with Friends and Family: Not on file    Attends Judaism Services: Not on file    Active Member of [address] [place] [address] [employer] or Organizations: Not on file    Attends Club or Organization Meetings: Not on file    Marital Status: Not on file   Intimate Partner Violence:     Fear of Current or Ex-Partner: Not on file    Emotionally Abused: Not on file    Physically Abused: Not on file    Sexually Abused: Not on file   Housing Stability:     Unable to Pay for Housing in the Last Year: Not on file    Number of [place] in the Last Year: Not on file    Unstable Housing in the Last Year: Not on file     No current facility-administered medications for this encounter.      Current Outpatient Medications   Medication Sig Dispense Refill    ondansetron (ZOFRAN ODT) 4 MG disintegrating tablet Take 1 tablet by mouth every 8 hours as needed for Nausea 20 tablet 0    LORazepam (ATIVAN) 0.5 MG tablet Take 1 tablet by mouth every 8 hours as needed for Anxiety for up to 30 days. 20 tablet 0    glipiZIDE (GLUCOTROL XL) 2.5 MG extended release tablet Take 2.5 mg by mouth daily      aspirin 81 MG EC tablet Take 81 mg by mouth daily      Glucosamine 500 MG CAPS Take by mouth      mirtazapine (REMERON) 15 MG tablet Take 7.5 mg by mouth nightly (Patient not taking: Reported on 8/18/2021)      escitalopram (LEXAPRO) 10 MG tablet Take 10 mg by mouth daily      latanoprost (XALATAN) 0.005 % ophthalmic solution 1 drop nightly      atenolol (TENORMIN) 100 MG tablet Take 100 mg by mouth daily      montelukast (SINGULAIR) 10 MG tablet Take 10 mg by mouth nightly      hydrochlorothiazide (HYDRODIURIL) 25 MG tablet Take 25 mg by mouth daily      levothyroxine (SYNTHROID) 75 MCG tablet Take 100 mcg by mouth Daily       simvastatin (ZOCOR) 10 MG tablet Take 10 mg by mouth nightly      lisinopril (PRINIVIL;ZESTRIL) 40 MG tablet Take 40 mg by mouth nightly      metFORMIN (GLUCOPHAGE) 1000 MG tablet Take 1,000 mg by mouth 3 times daily       Calcium Carbonate (CALTRATE 600 PO) Take by mouth 2 times daily       Allergies   Allergen Reactions    Z-Alvarado [Azithromycin] Hives       REVIEW OF SYSTEMS  10 systems reviewed, pertinent positives per HPI otherwise noted to be negative    PHYSICAL EXAM  BP (!) 136/53   Pulse 69   Temp 98.2 °F (36.8 °C) (Oral)   Resp 16   SpO2 98%   GENERAL APPEARANCE: Awake and alert. Cooperative. No acute distress. Vital signs are stable. Well appearing and non toxic. HEAD: Normocephalic. Atraumatic. EYES: PERRL. EOM's grossly intact. ENT: Mucous membranes are moist.   NECK: Supple. Normal ROM. HEART: RRR. Distal pulses are equal and intact. Cap refill less than 2 seconds. LUNGS: Respirations unlabored. CTAB. Good air exchange. Speaking comfortably in full sentences.  No wheezing, rhonchi, rales, stridor. ABDOMEN: Soft. Non-distended. Non-tender. No guarding or rebound. No rigidity. Bowel sounds are present. Negative dial's. Negative McBurney's point. Negative CVA tenderness. EXTREMITIES: No peripheral edema. Moves all extremities equally. All extremities neurovascularly intact. SKIN: Warm and dry. No acute rashes. NEUROLOGICAL: Alert and oriented. No gross facial drooping. Strength 5/5, sensation intact. PSYCHIATRIC: Normal mood and affect. SCREENINGS       RADIOLOGY  XR CHEST (2 VW)    Result Date: 3/6/2022  EXAM: XR Chest, 2 Views EXAM DATE/TIME: 3/6/2022 6:10 pm CLINICAL HISTORY: ORDERING SYSTEM PROVIDED  nausea  TECHNOLOGIST PROVIDED HISTORY:  Reason for exam:->nausea  Reason for Exam: nausea TECHNIQUE: Frontal and lateral views of the chest. COMPARISON: No relevant prior studies available. FINDINGS: Lungs:  No acute findings. No consolidation. Pleural space:  No acute findings. No pneumothorax. Heart:  Mild cardiomegaly. Mediastinum:  Moderately large hiatal hernia is noted. Bones/joints:  Multilevel degenerative changes of the spine. No definite acute bony abnormality is noted. 1.  Moderately large hiatal hernia is noted. 2.  Mild cardiomegaly. 3.  No acute pulmonary disease is noted. CT ABDOMEN PELVIS W IV CONTRAST Additional Contrast? None    Result Date: 3/6/2022  EXAMINATION: CT OF THE ABDOMEN AND PELVIS WITH CONTRAST 3/6/2022 7:18 pm TECHNIQUE: CT of the abdomen and pelvis was performed with the administration of intravenous contrast. Multiplanar reformatted images are provided for review. Dose modulation, iterative reconstruction, and/or weight based adjustment of the mA/kV was utilized to reduce the radiation dose to as low as reasonably achievable. COMPARISON: None.  HISTORY: ORDERING SYSTEM PROVIDED HISTORY: nausea TECHNOLOGIST PROVIDED HISTORY: Reason for exam:->nausea Additional Contrast?->None Decision Support Exception - unselect if not a suspected or confirmed emergency medical condition->Emergency Medical Condition (MA) Reason for Exam: nausea x1 week, pt scheduled for eye surgery this week, hx of bladder cancer FINDINGS: Lower Chest: Cardiomegaly. Moderate-sized hiatal hernia. Visualized portion of the lower chest demonstrates no acute abnormality. Organs: The liver, spleen, pancreas, adrenal glands and kidneys demonstrate no acute abnormality. Status post cholecystectomy. GI/Bowel: In the left mid abdomen, there is a questionable 2.5 cm mass versus collapsed loop of bowel. On the sagittal reconstructions, there is suspected bowel tethering in this region (sagittal image 71). Normal appendix. Otherwise no acutely dilated or inflamed loops of bowel. Pelvis: No pelvic mass, adenopathy, or fluid collection. Peritoneum/Retroperitoneum: No abdominal aortic aneurysm. No adenopathy. No ascites. No free intraperitoneal air. Celiac trunk and SMA are patent. Portal venous system is patent. Bones/Soft Tissues: No acute osseous abnormality. 1. Left mid abdominal 2.5 cm mass versus collapsed small bowel. Questionable adjacent bowel tethering. A carcinoid tumor is not excluded. Follow-up outpatient CT enterography should be considered as well as gastroenterology consultation. 2. Otherwise no acute abnormality in the abdomen or pelvis. ED COURSE/MDM  Patient seen and evaluated. Old records reviewed. Diagnostic testing reviewed and results discussed. I have independently evaluated this patient based upon my scope of practice. Supervising physician was in the department for consultation as needed. Fredy Baker presented to the ED today with above noted complaints. Upon arrival to the emergency department patient is in no acute distress vital signs stable patient is hypertensive at 190/66. Otherwise afebrile with heart rate of 60. Patient is complaining of a main complaint is nausea.   Patient given Pepcid, Zofran, sodium chloride bolus.    Emergency department work-up blood work unremarkable no STONE, transaminitis, significant electrolyte abnormality, mild hyponatremia 132 mild leukocytosis of 13.9 no bandemia stable hemoglobin and hematocrit 11.6 and 36.5. Lipase mildly elevated at 71. Troponin less than 0.01. Urinalysis negative for infection or hematuria. CT of the abdomen pelvis shows a left mid abdominal 2.5 cm mass versus collapsed small bowel. Questionable adjacent bowel tethering. A carcinoid tumor is not excluded. Follow-up outpatient CT enterography should be considered as well as gastroenterology consultation otherwise no acute abnormality in the abdomen or pelvis. I discussed the above CT findings at length with patient and daughter provided them with referral for gastroenterology to urgently follow-up with this week for further evaluation of possible abdominal mass. They both verbalized understanding all questions were answered. While in ED patient received   Medications   ondansetron (ZOFRAN) injection 4 mg (4 mg IntraVENous Given 3/6/22 1834)   0.9 % sodium chloride bolus (0 mLs IntraVENous Stopped 3/6/22 1933)   famotidine (PEPCID) injection 20 mg (20 mg IntraVENous Given 3/6/22 1834)   iopamidol (ISOVUE-370) 76 % injection 75 mL (75 mLs IntraVENous Given 3/6/22 1932)             At this point I do not feel the patient requires further work up and it is reasonable to discharge the patient. A discussion was had with the patient and/or their surrogate regarding diagnosis, diagnostic testing results, treatment/ plan of care, and follow up. There was shared decision-making between myself as well as the patient and/or their surrogate and we are all in agreement with discharge home. There was an opportunity for questions and all questions were answered to the best of my ability and to the satisfaction of the patient and/or patient family. Patient will follow up with GI for further evaluation/treatment.  The patient was given strict return precautions as we discussed symptoms that would necessitate return to the ED. Patient will return to ED for new/worsening symptoms. The patient verbalized their understanding and agreement with the above plan. Please refer to AVS for further details regarding discharge instructions.       Results for orders placed or performed during the hospital encounter of 03/06/22   CBC with Auto Differential   Result Value Ref Range    WBC 13.9 (H) 4.0 - 11.0 K/uL    RBC 4.94 4.00 - 5.20 M/uL    Hemoglobin 11.6 (L) 12.0 - 16.0 g/dL    Hematocrit 36.5 36.0 - 48.0 %    MCV 73.9 (L) 80.0 - 100.0 fL    MCH 23.6 (L) 26.0 - 34.0 pg    MCHC 31.9 31.0 - 36.0 g/dL    RDW 17.5 (H) 12.4 - 15.4 %    Platelets 622 951 - 480 K/uL    MPV 7.4 5.0 - 10.5 fL    Neutrophils % 72.7 %    Lymphocytes % 14.1 %    Monocytes % 10.7 %    Eosinophils % 2.0 %    Basophils % 0.5 %    Neutrophils Absolute 10.1 (H) 1.7 - 7.7 K/uL    Lymphocytes Absolute 2.0 1.0 - 5.1 K/uL    Monocytes Absolute 1.5 (H) 0.0 - 1.3 K/uL    Eosinophils Absolute 0.3 0.0 - 0.6 K/uL    Basophils Absolute 0.1 0.0 - 0.2 K/uL   Comprehensive Metabolic Panel w/ Reflex to MG   Result Value Ref Range    Sodium 132 (L) 136 - 145 mmol/L    Potassium reflex Magnesium 3.9 3.5 - 5.1 mmol/L    Chloride 93 (L) 99 - 110 mmol/L    CO2 24 21 - 32 mmol/L    Anion Gap 15 3 - 16    Glucose 70 70 - 99 mg/dL    BUN 18 7 - 20 mg/dL    CREATININE 0.8 0.6 - 1.2 mg/dL    GFR Non-African American >60 >60    GFR African American >60 >60    Calcium 10.2 8.3 - 10.6 mg/dL    Total Protein 6.7 6.4 - 8.2 g/dL    Albumin 4.6 3.4 - 5.0 g/dL    Albumin/Globulin Ratio 2.2 1.1 - 2.2    Total Bilirubin 0.5 0.0 - 1.0 mg/dL    Alkaline Phosphatase 51 40 - 129 U/L    ALT 16 10 - 40 U/L    AST 14 (L) 15 - 37 U/L   Lipase   Result Value Ref Range    Lipase 71.0 (H) 13.0 - 60.0 U/L   Troponin   Result Value Ref Range    Troponin <0.01 <0.01 ng/mL   Urinalysis with Reflex to Culture Specimen: Urine   Result Value Ref Range    Color, UA Yellow Straw/Yellow    Clarity, UA Clear Clear    Glucose, Ur Negative Negative mg/dL    Bilirubin Urine Negative Negative    Ketones, Urine Negative Negative mg/dL    Specific Gravity, UA 1.015 1.005 - 1.030    Blood, Urine Negative Negative    pH, UA 6.0 5.0 - 8.0    Protein, UA Negative Negative mg/dL    Urobilinogen, Urine 0.2 <2.0 E.U./dL    Nitrite, Urine Negative Negative    Leukocyte Esterase, Urine TRACE (A) Negative    Microscopic Examination YES     Urine Type NotGiven     Urine Reflex to Culture Not Indicated    Microscopic Urinalysis   Result Value Ref Range    WBC, UA 0-2 0 - 5 /HPF    RBC, UA None seen 0 - 4 /HPF    Epithelial Cells, UA 0-1 0 - 5 /HPF    Renal Epithelial, UA 2-5 (A) 0 - 1 /HPF    Bacteria, UA Rare (A) None Seen /HPF   EKG 12 Lead   Result Value Ref Range    Ventricular Rate 60 BPM    Atrial Rate 60 BPM    P-R Interval 196 ms    QRS Duration 90 ms    Q-T Interval 472 ms    QTc Calculation (Bazett) 472 ms    P Axis 23 degrees    R Axis -35 degrees    T Axis -18 degrees    Diagnosis       Normal sinus rhythmLeft axis deviationAbnormal ECGNo previous ECGs available       I estimate there is LOW risk for ACUTE APPENDICITIS, BOWEL OBSTRUCTION, CHOLECYSTITIS, DIVERTICULITIS, INCARCERATED HERNIA, PANCREATITIS, PELVIC INFLAMMATORY DISEASE, PERFORATED BOWEL or ULCER, PREGNANCY, or TUBO-OVARIAN ABSCESS, thus I consider the discharge disposition reasonable. Also, there is no evidence or peritonitis, sepsis, or toxicity. [address] and I have discussed the diagnosis and risks, and we agree with discharging home to follow-up with their primary doctor. We also discussed returning to the Emergency Department immediately if new or worsening symptoms occur. We have discussed the symptoms which are most concerning (e.g., bloody stool, fever, changing or worsening pain, vomiting) that necessitate immediate return. Final Impression    1. Abdominal mass, unspecified abdominal location    2. Nausea        Blood pressure (!) 136/53, pulse 69, temperature 98.2 °F (36.8 °C), temperature source Oral, resp. rate 16, SpO2 98 %, not currently breastfeeding.mdm    Patient was sent home with a prescription for below medication/s. I did [place] patient on appropriate use of these medication. Discharge Medication List as of 3/6/2022  9:21 PM      START taking these medications    Details   ondansetron (ZOFRAN ODT) 4 MG disintegrating tablet Take 1 tablet by mouth every 8 hours as needed for Nausea, Disp-20 tablet, R-0Print      LORazepam (ATIVAN) 0.5 MG tablet Take 1 tablet by mouth every 8 hours as needed for Anxiety for up to 30 days. , Disp-20 tablet, R-0Print                 FOLLOW UP  [address] [place] [address]  [phone]    Call in 1 day  follow up    [facility]  ED  [address]          DISPOSITION  Patient was discharged to home in good condition. Comment: Please note this report has been produced using speech recognition software and may contain errors related to that system including errors in grammar, punctuation, and spelling, as well as words and phrases that may be inappropriate. If there are any questions or concerns please feel free to contact the dictating provider for clarification.             OFELIA Bellamy CNP  03/07/22 0891

## 2022-03-07 LAB
EKG ATRIAL RATE: 60 BPM
EKG DIAGNOSIS: NORMAL
EKG P AXIS: 23 DEGREES
EKG P-R INTERVAL: 196 MS
EKG Q-T INTERVAL: 472 MS
EKG QRS DURATION: 90 MS
EKG QTC CALCULATION (BAZETT): 472 MS
EKG R AXIS: -35 DEGREES
EKG T AXIS: -18 DEGREES
EKG VENTRICULAR RATE: 60 BPM

## 2022-03-07 PROCEDURE — 93010 ELECTROCARDIOGRAM REPORT: CPT | Performed by: INTERNAL MEDICINE

## 2022-03-07 NOTE — ED PROVIDER NOTES
Patient seen and evaluated by PIERCE. EKG: Normal sinus rhythm with a rate of 60. Left axis deviation noted. Normal intervals and durations. No ST or T wave changes appreciated.   No previous EKG     Brian Wu DO  03/06/22 1916

## 2022-03-15 ENCOUNTER — HOSPITAL ENCOUNTER (OUTPATIENT)
Dept: CT IMAGING | Age: 82
Discharge: HOME | End: 2022-03-15
Payer: MEDICARE

## 2022-03-15 DIAGNOSIS — R93.89 ABNORMAL FINDING ON IMAGING: ICD-10-CM

## 2022-03-15 PROCEDURE — 74177 CT ABD & PELVIS W/CONTRAST: CPT

## 2022-03-15 PROCEDURE — 2500000003 HC RX 250 WO HCPCS: Performed by: INTERNAL MEDICINE

## 2022-03-15 PROCEDURE — 6360000004 HC RX CONTRAST MEDICATION: Performed by: INTERNAL MEDICINE

## 2022-03-15 RX ADMIN — IOPAMIDOL 100 ML: 755 INJECTION, SOLUTION INTRAVENOUS at 15:59

## 2022-03-15 RX ADMIN — BARIUM SULFATE 1350 ML: 1 SUSPENSION ORAL at 15:58

## 2022-10-24 ENCOUNTER — APPOINTMENT (OUTPATIENT)
Dept: GENERAL RADIOLOGY | Age: 82
DRG: 812 | End: 2022-10-24
Payer: MEDICARE

## 2022-10-24 ENCOUNTER — HOSPITAL ENCOUNTER (INPATIENT)
Age: 82
LOS: 3 days | Discharge: HOME | DRG: 812 | End: 2022-10-27
Attending: EMERGENCY MEDICINE | Admitting: INTERNAL MEDICINE
Payer: MEDICARE

## 2022-10-24 DIAGNOSIS — R68.89 SIGNS AND SYMPTOMS OF ANEMIA: Primary | ICD-10-CM

## 2022-10-24 DIAGNOSIS — R06.09 DYSPNEA ON EXERTION: ICD-10-CM

## 2022-10-24 PROBLEM — D64.9 ANEMIA: Status: ACTIVE | Noted: 2022-10-24

## 2022-10-24 LAB
A/G RATIO: 1.9 (ref 1.1–2.2)
ABO/RH: NORMAL
ALBUMIN SERPL-MCNC: 4.2 G/DL (ref 3.4–5)
ALP BLD-CCNC: 43 U/L (ref 40–129)
ALT SERPL-CCNC: 16 U/L (ref 10–40)
ANION GAP SERPL CALCULATED.3IONS-SCNC: 13 MMOL/L (ref 3–16)
ANTIBODY SCREEN: NORMAL
AST SERPL-CCNC: 19 U/L (ref 15–37)
BASOPHILS ABSOLUTE: 0.1 K/UL (ref 0–0.2)
BASOPHILS RELATIVE PERCENT: 1.3 %
BILIRUB SERPL-MCNC: 0.7 MG/DL (ref 0–1)
BUN BLDV-MCNC: 32 MG/DL (ref 7–20)
CALCIUM SERPL-MCNC: 9.2 MG/DL (ref 8.3–10.6)
CHLORIDE BLD-SCNC: 100 MMOL/L (ref 99–110)
CO2: 23 MMOL/L (ref 21–32)
CREAT SERPL-MCNC: 1 MG/DL (ref 0.6–1.2)
EOSINOPHILS ABSOLUTE: 0.2 K/UL (ref 0–0.6)
EOSINOPHILS RELATIVE PERCENT: 2 %
GFR SERPL CREATININE-BSD FRML MDRD: 57 ML/MIN/{1.73_M2}
GLUCOSE BLD-MCNC: 101 MG/DL (ref 70–99)
HCT VFR BLD CALC: 19.1 % (ref 36–48)
HCT VFR BLD CALC: 21.2 % (ref 36–48)
HEMATOLOGY PATH CONSULT: YES
HEMOGLOBIN: 5.3 G/DL (ref 12–16)
HEMOGLOBIN: 6 G/DL (ref 12–16)
INR BLD: 1.2 (ref 0.87–1.14)
LYMPHOCYTES ABSOLUTE: 1.6 K/UL (ref 1–5.1)
LYMPHOCYTES RELATIVE PERCENT: 15.9 %
MCH RBC QN AUTO: 17.6 PG (ref 26–34)
MCHC RBC AUTO-ENTMCNC: 28.1 G/DL (ref 31–36)
MCV RBC AUTO: 62.7 FL (ref 80–100)
MONOCYTES ABSOLUTE: 1.1 K/UL (ref 0–1.3)
MONOCYTES RELATIVE PERCENT: 10.4 %
NEUTROPHILS ABSOLUTE: 7.2 K/UL (ref 1.7–7.7)
NEUTROPHILS RELATIVE PERCENT: 70.4 %
OCCULT BLOOD SCREENING: NORMAL
PDW BLD-RTO: 22 % (ref 12.4–15.4)
PLATELET # BLD: 399 K/UL (ref 135–450)
PMV BLD AUTO: 8.2 FL (ref 5–10.5)
POTASSIUM SERPL-SCNC: 4 MMOL/L (ref 3.5–5.1)
PROTHROMBIN TIME: 15.2 SEC (ref 11.7–14.5)
RBC # BLD: 3.39 M/UL (ref 4–5.2)
SLIDE REVIEW: ABNORMAL
SODIUM BLD-SCNC: 136 MMOL/L (ref 136–145)
TOTAL PROTEIN: 6.4 G/DL (ref 6.4–8.2)
TROPONIN: <0.01 NG/ML
WBC # BLD: 10.2 K/UL (ref 4–11)

## 2022-10-24 PROCEDURE — 93005 ELECTROCARDIOGRAM TRACING: CPT | Performed by: EMERGENCY MEDICINE

## 2022-10-24 PROCEDURE — P9016 RBC LEUKOCYTES REDUCED: HCPCS

## 2022-10-24 PROCEDURE — G0378 HOSPITAL OBSERVATION PER HR: HCPCS

## 2022-10-24 PROCEDURE — 86850 RBC ANTIBODY SCREEN: CPT

## 2022-10-24 PROCEDURE — 82270 OCCULT BLOOD FECES: CPT

## 2022-10-24 PROCEDURE — 99285 EMERGENCY DEPT VISIT HI MDM: CPT

## 2022-10-24 PROCEDURE — 85018 HEMOGLOBIN: CPT

## 2022-10-24 PROCEDURE — 71046 X-RAY EXAM CHEST 2 VIEWS: CPT

## 2022-10-24 PROCEDURE — 85610 PROTHROMBIN TIME: CPT

## 2022-10-24 PROCEDURE — 86900 BLOOD TYPING SEROLOGIC ABO: CPT

## 2022-10-24 PROCEDURE — 84484 ASSAY OF TROPONIN QUANT: CPT

## 2022-10-24 PROCEDURE — 86901 BLOOD TYPING SEROLOGIC RH(D): CPT

## 2022-10-24 PROCEDURE — 1200000000 HC SEMI PRIVATE

## 2022-10-24 PROCEDURE — 80053 COMPREHEN METABOLIC PANEL: CPT

## 2022-10-24 PROCEDURE — 85025 COMPLETE CBC W/AUTO DIFF WBC: CPT

## 2022-10-24 PROCEDURE — 85014 HEMATOCRIT: CPT

## 2022-10-24 PROCEDURE — 86923 COMPATIBILITY TEST ELECTRIC: CPT

## 2022-10-24 RX ORDER — SODIUM CHLORIDE 9 MG/ML
INJECTION, SOLUTION INTRAVENOUS PRN
Status: DISCONTINUED | OUTPATIENT
Start: 2022-10-24 | End: 2022-10-27 | Stop reason: HOSPADM

## 2022-10-24 ASSESSMENT — PAIN - FUNCTIONAL ASSESSMENT: PAIN_FUNCTIONAL_ASSESSMENT: 0-10

## 2022-10-24 ASSESSMENT — ENCOUNTER SYMPTOMS
SHORTNESS OF BREATH: 1
ABDOMINAL PAIN: 0
VOMITING: 0
NAUSEA: 0
COUGH: 0
SORE THROAT: 0
CHEST TIGHTNESS: 0
BACK PAIN: 0
DIARRHEA: 0

## 2022-10-24 ASSESSMENT — PAIN DESCRIPTION - ORIENTATION: ORIENTATION: LEFT

## 2022-10-24 ASSESSMENT — PAIN SCALES - GENERAL: PAINLEVEL_OUTOF10: 2

## 2022-10-24 ASSESSMENT — PAIN DESCRIPTION - PAIN TYPE: TYPE: ACUTE PAIN

## 2022-10-24 ASSESSMENT — PAIN DESCRIPTION - FREQUENCY: FREQUENCY: INTERMITTENT

## 2022-10-24 ASSESSMENT — PAIN DESCRIPTION - LOCATION: LOCATION: EAR;THROAT

## 2022-10-25 ENCOUNTER — APPOINTMENT (OUTPATIENT)
Dept: CT IMAGING | Age: 82
DRG: 812 | End: 2022-10-25
Payer: MEDICARE

## 2022-10-25 LAB
ANION GAP SERPL CALCULATED.3IONS-SCNC: 10 MMOL/L (ref 3–16)
BLOOD BANK DISPENSE STATUS: NORMAL
BLOOD BANK DISPENSE STATUS: NORMAL
BLOOD BANK PRODUCT CODE: NORMAL
BLOOD BANK PRODUCT CODE: NORMAL
BPU ID: NORMAL
BPU ID: NORMAL
BUN BLDV-MCNC: 25 MG/DL (ref 7–20)
CALCIUM SERPL-MCNC: 8.4 MG/DL (ref 8.3–10.6)
CHLORIDE BLD-SCNC: 103 MMOL/L (ref 99–110)
CO2: 24 MMOL/L (ref 21–32)
CREAT SERPL-MCNC: 1 MG/DL (ref 0.6–1.2)
DESCRIPTION BLOOD BANK: NORMAL
DESCRIPTION BLOOD BANK: NORMAL
EKG ATRIAL RATE: 62 BPM
EKG DIAGNOSIS: NORMAL
EKG P AXIS: 18 DEGREES
EKG P-R INTERVAL: 160 MS
EKG Q-T INTERVAL: 476 MS
EKG QRS DURATION: 82 MS
EKG QTC CALCULATION (BAZETT): 483 MS
EKG R AXIS: -6 DEGREES
EKG T AXIS: 13 DEGREES
EKG VENTRICULAR RATE: 62 BPM
FERRITIN: 5.3 NG/ML (ref 15–150)
FOLATE: 17.12 NG/ML (ref 4.78–24.2)
GFR SERPL CREATININE-BSD FRML MDRD: 57 ML/MIN/{1.73_M2}
GLUCOSE BLD-MCNC: 103 MG/DL (ref 70–99)
GLUCOSE BLD-MCNC: 114 MG/DL (ref 70–99)
GLUCOSE BLD-MCNC: 118 MG/DL (ref 70–99)
GLUCOSE BLD-MCNC: 162 MG/DL (ref 70–99)
GLUCOSE BLD-MCNC: 165 MG/DL (ref 70–99)
HCT VFR BLD CALC: 22.1 % (ref 36–48)
HCT VFR BLD CALC: 25 % (ref 36–48)
HCT VFR BLD CALC: 26.1 % (ref 36–48)
HEMATOLOGY PATH CONSULT: NORMAL
HEMOGLOBIN: 6.4 G/DL (ref 12–16)
HEMOGLOBIN: 7.5 G/DL (ref 12–16)
HEMOGLOBIN: 7.8 G/DL (ref 12–16)
IMMATURE RETIC FRACT: 0.51 (ref 0.21–0.37)
IRON SATURATION: 6 % (ref 15–50)
IRON: 23 UG/DL (ref 37–145)
LACTATE DEHYDROGENASE: 183 U/L (ref 100–190)
PERFORMED ON: ABNORMAL
POTASSIUM REFLEX MAGNESIUM: 3.6 MMOL/L (ref 3.5–5.1)
RETICULOCYTE ABSOLUTE COUNT: 0.1 M/UL (ref 0.02–0.1)
RETICULOCYTE COUNT PCT: 2.79 % (ref 0.5–2.18)
SODIUM BLD-SCNC: 137 MMOL/L (ref 136–145)
TOTAL IRON BINDING CAPACITY: 393 UG/DL (ref 260–445)
TSH REFLEX: 3.41 UIU/ML (ref 0.27–4.2)
VITAMIN B-12: 224 PG/ML (ref 211–911)

## 2022-10-25 PROCEDURE — 80048 BASIC METABOLIC PNL TOTAL CA: CPT

## 2022-10-25 PROCEDURE — 82728 ASSAY OF FERRITIN: CPT

## 2022-10-25 PROCEDURE — 74177 CT ABD & PELVIS W/CONTRAST: CPT

## 2022-10-25 PROCEDURE — 85014 HEMATOCRIT: CPT

## 2022-10-25 PROCEDURE — 83550 IRON BINDING TEST: CPT

## 2022-10-25 PROCEDURE — 84238 ASSAY NONENDOCRINE RECEPTOR: CPT

## 2022-10-25 PROCEDURE — 2580000003 HC RX 258: Performed by: NURSE PRACTITIONER

## 2022-10-25 PROCEDURE — 83036 HEMOGLOBIN GLYCOSYLATED A1C: CPT

## 2022-10-25 PROCEDURE — 83010 ASSAY OF HAPTOGLOBIN QUANT: CPT

## 2022-10-25 PROCEDURE — 36415 COLL VENOUS BLD VENIPUNCTURE: CPT

## 2022-10-25 PROCEDURE — 1200000000 HC SEMI PRIVATE

## 2022-10-25 PROCEDURE — 2580000003 HC RX 258: Performed by: INTERNAL MEDICINE

## 2022-10-25 PROCEDURE — 6360000004 HC RX CONTRAST MEDICATION: Performed by: INTERNAL MEDICINE

## 2022-10-25 PROCEDURE — 36430 TRANSFUSION BLD/BLD COMPNT: CPT

## 2022-10-25 PROCEDURE — 83540 ASSAY OF IRON: CPT

## 2022-10-25 PROCEDURE — 82746 ASSAY OF FOLIC ACID SERUM: CPT

## 2022-10-25 PROCEDURE — 82607 VITAMIN B-12: CPT

## 2022-10-25 PROCEDURE — 96365 THER/PROPH/DIAG IV INF INIT: CPT

## 2022-10-25 PROCEDURE — G0378 HOSPITAL OBSERVATION PER HR: HCPCS

## 2022-10-25 PROCEDURE — 85018 HEMOGLOBIN: CPT

## 2022-10-25 PROCEDURE — 6370000000 HC RX 637 (ALT 250 FOR IP): Performed by: INTERNAL MEDICINE

## 2022-10-25 PROCEDURE — 83615 LACTATE (LD) (LDH) ENZYME: CPT

## 2022-10-25 PROCEDURE — 84443 ASSAY THYROID STIM HORMONE: CPT

## 2022-10-25 PROCEDURE — 85045 AUTOMATED RETICULOCYTE COUNT: CPT

## 2022-10-25 PROCEDURE — 6360000002 HC RX W HCPCS: Performed by: NURSE PRACTITIONER

## 2022-10-25 RX ORDER — ASPIRIN 81 MG/1
81 TABLET ORAL DAILY
Status: DISCONTINUED | OUTPATIENT
Start: 2022-10-25 | End: 2022-10-27 | Stop reason: HOSPADM

## 2022-10-25 RX ORDER — ATENOLOL 25 MG/1
100 TABLET ORAL DAILY
Status: DISCONTINUED | OUTPATIENT
Start: 2022-10-25 | End: 2022-10-25

## 2022-10-25 RX ORDER — INSULIN LISPRO 100 [IU]/ML
0-4 INJECTION, SOLUTION INTRAVENOUS; SUBCUTANEOUS NIGHTLY
Status: DISCONTINUED | OUTPATIENT
Start: 2022-10-25 | End: 2022-10-27 | Stop reason: HOSPADM

## 2022-10-25 RX ORDER — POLYETHYLENE GLYCOL 3350 17 G/17G
17 POWDER, FOR SOLUTION ORAL DAILY PRN
Status: DISCONTINUED | OUTPATIENT
Start: 2022-10-25 | End: 2022-10-27 | Stop reason: HOSPADM

## 2022-10-25 RX ORDER — ESCITALOPRAM OXALATE 10 MG/1
20 TABLET ORAL DAILY
Status: DISCONTINUED | OUTPATIENT
Start: 2022-10-25 | End: 2022-10-27 | Stop reason: HOSPADM

## 2022-10-25 RX ORDER — INSULIN LISPRO 100 [IU]/ML
0-8 INJECTION, SOLUTION INTRAVENOUS; SUBCUTANEOUS
Status: DISCONTINUED | OUTPATIENT
Start: 2022-10-25 | End: 2022-10-27 | Stop reason: HOSPADM

## 2022-10-25 RX ORDER — LEVOTHYROXINE SODIUM 0.1 MG/1
100 TABLET ORAL DAILY
Status: DISCONTINUED | OUTPATIENT
Start: 2022-10-25 | End: 2022-10-27 | Stop reason: HOSPADM

## 2022-10-25 RX ORDER — SODIUM CHLORIDE 9 MG/ML
INJECTION, SOLUTION INTRAVENOUS PRN
Status: DISCONTINUED | OUTPATIENT
Start: 2022-10-25 | End: 2022-10-25 | Stop reason: SDUPTHER

## 2022-10-25 RX ORDER — ONDANSETRON 2 MG/ML
4 INJECTION INTRAMUSCULAR; INTRAVENOUS EVERY 4 HOURS PRN
Status: DISCONTINUED | OUTPATIENT
Start: 2022-10-25 | End: 2022-10-27 | Stop reason: HOSPADM

## 2022-10-25 RX ORDER — SODIUM CHLORIDE 0.9 % (FLUSH) 0.9 %
10 SYRINGE (ML) INJECTION EVERY 12 HOURS SCHEDULED
Status: DISCONTINUED | OUTPATIENT
Start: 2022-10-25 | End: 2022-10-27 | Stop reason: HOSPADM

## 2022-10-25 RX ORDER — ATENOLOL 25 MG/1
50 TABLET ORAL DAILY
Status: DISCONTINUED | OUTPATIENT
Start: 2022-10-26 | End: 2022-10-27 | Stop reason: HOSPADM

## 2022-10-25 RX ORDER — ACETAMINOPHEN 325 MG/1
650 TABLET ORAL EVERY 4 HOURS PRN
Status: DISCONTINUED | OUTPATIENT
Start: 2022-10-25 | End: 2022-10-27 | Stop reason: HOSPADM

## 2022-10-25 RX ORDER — SODIUM CHLORIDE 9 MG/ML
INJECTION, SOLUTION INTRAVENOUS CONTINUOUS
Status: DISCONTINUED | OUTPATIENT
Start: 2022-10-25 | End: 2022-10-25

## 2022-10-25 RX ORDER — MONTELUKAST SODIUM 10 MG/1
10 TABLET ORAL NIGHTLY
Status: DISCONTINUED | OUTPATIENT
Start: 2022-10-25 | End: 2022-10-27 | Stop reason: HOSPADM

## 2022-10-25 RX ORDER — ACETAMINOPHEN 650 MG/1
650 SUPPOSITORY RECTAL EVERY 4 HOURS PRN
Status: DISCONTINUED | OUTPATIENT
Start: 2022-10-25 | End: 2022-10-27 | Stop reason: HOSPADM

## 2022-10-25 RX ORDER — ATORVASTATIN CALCIUM 10 MG/1
10 TABLET, FILM COATED ORAL DAILY
Status: DISCONTINUED | OUTPATIENT
Start: 2022-10-25 | End: 2022-10-27 | Stop reason: HOSPADM

## 2022-10-25 RX ORDER — CHLORTHALIDONE 25 MG/1
25 TABLET ORAL DAILY
Status: DISCONTINUED | OUTPATIENT
Start: 2022-10-25 | End: 2022-10-27 | Stop reason: HOSPADM

## 2022-10-25 RX ORDER — DEXTROSE MONOHYDRATE 100 MG/ML
INJECTION, SOLUTION INTRAVENOUS CONTINUOUS PRN
Status: DISCONTINUED | OUTPATIENT
Start: 2022-10-25 | End: 2022-10-27 | Stop reason: HOSPADM

## 2022-10-25 RX ORDER — LISINOPRIL 20 MG/1
40 TABLET ORAL NIGHTLY
Status: DISCONTINUED | OUTPATIENT
Start: 2022-10-25 | End: 2022-10-27 | Stop reason: HOSPADM

## 2022-10-25 RX ORDER — LATANOPROST 50 UG/ML
1 SOLUTION/ DROPS OPHTHALMIC NIGHTLY
Status: DISCONTINUED | OUTPATIENT
Start: 2022-10-25 | End: 2022-10-27 | Stop reason: HOSPADM

## 2022-10-25 RX ORDER — SODIUM CHLORIDE 9 MG/ML
INJECTION, SOLUTION INTRAVENOUS PRN
Status: DISCONTINUED | OUTPATIENT
Start: 2022-10-25 | End: 2022-10-27 | Stop reason: HOSPADM

## 2022-10-25 RX ORDER — SODIUM CHLORIDE 0.9 % (FLUSH) 0.9 %
10 SYRINGE (ML) INJECTION PRN
Status: DISCONTINUED | OUTPATIENT
Start: 2022-10-25 | End: 2022-10-27 | Stop reason: HOSPADM

## 2022-10-25 RX ADMIN — SODIUM CHLORIDE: 9 INJECTION, SOLUTION INTRAVENOUS at 01:18

## 2022-10-25 RX ADMIN — DIATRIZOATE MEGLUMINE AND DIATRIZOATE SODIUM 16 ML: 660; 100 LIQUID ORAL; RECTAL at 15:10

## 2022-10-25 RX ADMIN — CHLORTHALIDONE 25 MG: 25 TABLET ORAL at 13:51

## 2022-10-25 RX ADMIN — MONTELUKAST SODIUM 10 MG: 10 TABLET, COATED ORAL at 20:36

## 2022-10-25 RX ADMIN — SODIUM CHLORIDE, PRESERVATIVE FREE 10 ML: 5 INJECTION INTRAVENOUS at 20:36

## 2022-10-25 RX ADMIN — MONTELUKAST SODIUM 10 MG: 10 TABLET, COATED ORAL at 02:46

## 2022-10-25 RX ADMIN — ESCITALOPRAM OXALATE 20 MG: 10 TABLET ORAL at 08:09

## 2022-10-25 RX ADMIN — IOPAMIDOL 75 ML: 755 INJECTION, SOLUTION INTRAVENOUS at 16:54

## 2022-10-25 RX ADMIN — LEVOTHYROXINE SODIUM 100 MCG: 0.1 TABLET ORAL at 06:28

## 2022-10-25 RX ADMIN — ATORVASTATIN CALCIUM 10 MG: 10 TABLET, FILM COATED ORAL at 08:09

## 2022-10-25 RX ADMIN — LISINOPRIL 40 MG: 20 TABLET ORAL at 02:46

## 2022-10-25 RX ADMIN — IRON SUCROSE 200 MG: 20 INJECTION, SOLUTION INTRAVENOUS at 19:05

## 2022-10-25 RX ADMIN — ATENOLOL 100 MG: 25 TABLET ORAL at 08:09

## 2022-10-25 RX ADMIN — LISINOPRIL 40 MG: 20 TABLET ORAL at 20:36

## 2022-10-25 RX ADMIN — ASPIRIN 81 MG: 81 TABLET, COATED ORAL at 08:09

## 2022-10-25 NOTE — H&P
APRN - CNP   glipiZIDE (GLUCOTROL XL) 2.5 MG extended release tablet Take 2.5 mg by mouth daily    Historical Provider, MD   aspirin 81 MG EC tablet Take 81 mg by mouth daily    Historical Provider, MD   Glucosamine 500 MG CAPS Take by mouth    Historical Provider, MD   mirtazapine (REMERON) 15 MG tablet Take 7.5 mg by mouth nightly  Patient not taking: No sig reported    Historical Provider, MD   escitalopram (LEXAPRO) 10 MG tablet Take 10 mg by mouth daily    Historical Provider, MD   latanoprost (XALATAN) 0.005 % ophthalmic solution 1 drop nightly  Patient not taking: Reported on 10/25/2022    Historical Provider, MD   atenolol (TENORMIN) 100 MG tablet Take 100 mg by mouth daily    Historical Provider, MD   montelukast (SINGULAIR) 10 MG tablet Take 10 mg by mouth nightly    Historical Provider, MD   hydrochlorothiazide (HYDRODIURIL) 25 MG tablet Take 25 mg by mouth daily    Historical Provider, MD   levothyroxine (SYNTHROID) 75 MCG tablet Take 100 mcg by mouth Daily     Historical Provider, MD   simvastatin (ZOCOR) 10 MG tablet Take 10 mg by mouth nightly    Historical Provider, MD   lisinopril (PRINIVIL;ZESTRIL) 40 MG tablet Take 40 mg by mouth nightly    Historical Provider, MD   metFORMIN (GLUCOPHAGE) 1000 MG tablet Take 1,000 mg by mouth 3 times daily     Historical Provider, MD   Calcium Carbonate (CALTRATE 600 PO) Take by mouth 2 times daily    Historical Provider, MD       Family History:       Problem Relation Age of Onset    Heart Disease Mother     Cancer Father         lymphoma    Diabetes Father      Social History:   TOBACCO:   reports that she has never smoked. She has never used smokeless tobacco.  ETOH:   reports no history of alcohol use.   OCCUPATION:      REVIEW OF SYSTEMS:  A full review of systems was performed and is negative except for that which appears in the HPI    Physical Exam:    Vitals: BP (!) 160/52   Pulse 57   Temp 98.3 °F (36.8 °C)   Resp 16   Ht 5' 4\" (1.626 m)   Wt 168 lb 3.2 oz (76.3 kg)   SpO2 98%   BMI 28.87 kg/m²   General appearance: WD/WN [de-identified]y.o. year-old female who is alert, appears stated age and is cooperative  HEENT: Head: Normocephalic, no lesions, without obvious abnormality. Eye: Normal external eye, conjunctiva, lids cornea, PEERL. Ears: Normal external ears. Non-tender. Nose: Normal external nose, mucus membranes and septum. Pharynx: Dental Hygiene adequate. Normal buccal mucosa. Normal pharynx. Neck: no adenopathy, no carotid bruit, no JVD, supple, symmetrical, trachea midline and thyroid not enlarged, symmetric, no tenderness/mass/nodules  Lungs: clear to auscultation bilaterally and no use of accessory muscles  Heart: regular rate and rhythm, S1, S2 normal, no murmur, click, rub or gallop and normal apical impulse  Abdomen: soft, non-tender; bowel sounds normal; no masses, no organomegaly  Extremities: extremities atraumatic, no cyanosis or edema and Homans sign is negative, no sign of DVT. Capillary Refill: Acceptable < 3 seconds   Peripheral Pulses: +3 easily felt, not easily obliterated with pressures   Skin: Skin color, texture, turgor normal. No rashes or lesions on exposed skin  Neurologic: Neurovascularly intact without any focal sensory/motor deficits. Cranial nerves: II-XII intact, grossly non-focal. Gait was not tested.   Mental Status: Alert and oriented, thought content appropriate, normal insight        CBC:   Recent Labs     10/24/22  1910 10/24/22  2232   WBC 10.2  --    HGB 6.0* 5.3*     --      BMP:    Recent Labs     10/24/22  1910      K 4.0      CO2 23   BUN 32*   CREATININE 1.0   GLUCOSE 101*     Troponin:   Recent Labs     10/24/22  1910   TROPONINI <0.01     PT/INR:  No results found for: PTINR  U/A:    Lab Results   Component Value Date/Time    LEUKOCYTESUR TRACE 03/06/2022 05:30 PM    RBCUA None seen 03/06/2022 05:30 PM    SPECGRAV 1.015 03/06/2022 05:30 PM    UROBILINOGEN 0.2 03/06/2022 05:30 PM    BILIRUBINUR Negative 03/06/2022 05:30 PM    BLOODU Negative 03/06/2022 05:30 PM    GLUCOSEU Negative 03/06/2022 05:30 PM    PROTEINU Negative 03/06/2022 05:30 PM         RAD:   I have independently reviewed and interpreted the imaging studies below and based my recommendations to the patient on those findings. XR CHEST (2 VW)    Result Date: 10/24/2022  EXAMINATION: TWO XRAY VIEWS OF THE CHEST 10/24/2022 7:34 pm COMPARISON: None. HISTORY: ORDERING SYSTEM PROVIDED HISTORY: shortness of breath TECHNOLOGIST PROVIDED HISTORY: Reason for exam:->shortness of breath Reason for Exam: Shortness of Breath; Pharyngitis FINDINGS: Mild cardiomegaly. Mild left basilar airspace opacities. No pneumothorax or pleural effusion     Mild left basilar airspace opacities which could represent atelectasis or pneumonia in the appropriate clinical setting. Assessment:   Principal Problem:    Anemia  Active Problems:    Essential hypertension    Mixed hyperlipidemia    Type 2 diabetes mellitus ([facility] [address]  Resolved Problems:    * No resolved hospital problems. *      Plan:       Anemia - Etiology unclear; stool is negative for occult blood in the ER, will check the reticulocyte count, soluble transferrin receptor, iron, TIBC, ferritin, B12 and folate. No indication for transfusion at this time. Continue to monitor Hemoglobin and Hematocrit and transfuse as necessary to maintain a Hemoglobin of 7 or higher    Diabetes mellitus II - SSI and carb control diet    Essential (primary) hypertension - continue home meds and monitor blood pressure    Hyperlipidemia - No current evidence of Rhabdomyolysis or other adverse effects. Continue statin therapy while in the hospital             Code Status: Full Code  Diet: ADULT DIET; Regular; 5 carb choices (75 gm/meal)  DVT Prophylaxis: SCDs    (Advanced care planning has been discussed with patient and/or responsible family member and is reflected in the code status.  Further orders associated with this have

## 2022-10-25 NOTE — PROGRESS NOTES
Hospitalist Progress Note      PCP: Soheila Slade MD    Date of Admission: 10/24/2022    Chief Complaint: exertional intolerance       Subjective:  She is feeling a little better after the blood transfusions. Still no visible bleeding. Medications:  Reviewed    Infusion Medications    dextrose      sodium chloride      sodium chloride 75 mL/hr at 10/25/22 0118    sodium chloride      sodium chloride       Scheduled Medications    atorvastatin  10 mg Oral Daily    montelukast  10 mg Oral Nightly    lisinopril  40 mg Oral Nightly    levothyroxine  100 mcg Oral Daily    latanoprost  1 drop Both Eyes Nightly    escitalopram  20 mg Oral Daily    atenolol  100 mg Oral Daily    aspirin  81 mg Oral Daily    sodium chloride flush  10 mL IntraVENous 2 times per day    insulin lispro  0-8 Units SubCUTAneous TID WC    insulin lispro  0-4 Units SubCUTAneous Nightly     PRN Meds: glucose, dextrose bolus **OR** dextrose bolus, glucagon (rDNA), dextrose, sodium chloride flush, sodium chloride, ondansetron, polyethylene glycol, acetaminophen **OR** acetaminophen, iohexol, sodium chloride, sodium chloride      Intake/Output Summary (Last 24 hours) at 10/25/2022 1252  Last data filed at 10/25/2022 0912  Gross per 24 hour   Intake 604.66 ml   Output --   Net 604.66 ml       Physical Exam Performed:    BP (!) 145/57   Pulse 56   Temp 98.5 °F (36.9 °C)   Resp 16   Ht 5' 4\" (1.626 m)   Wt 168 lb 3.2 oz (76.3 kg)   SpO2 93%   BMI 28.87 kg/m²     General appearance: No apparent distress, appears stated age and cooperative. HEENT: Pupils equal, round, and reactive to light. Conjunctivae/corneas clear. Neck: Supple, with full range of motion. No jugular venous distention. Trachea midline. Respiratory:  Normal respiratory effort. Clear to auscultation, bilaterally without Rales/Wheezes/Rhonchi. Cardiovascular: Regular rate and rhythm with normal S1/S2 without murmurs, rubs or gallops.   Abdomen: Soft, non-tender, non-distended with normal bowel sounds. Musculoskeletal: No clubbing, cyanosis or edema bilaterally. Full range of motion without deformity. Skin: Skin color, texture, turgor normal.  No rashes or lesions. Neurologic:  Neurovascularly intact without any focal sensory/motor deficits. Cranial nerves: II-XII intact, grossly non-focal.  Psychiatric: Alert and oriented, thought content appropriate, normal insight  Capillary Refill: Brisk, 3 seconds, normal   Peripheral Pulses: +2 palpable, equal bilaterally       Labs:   Recent Labs     10/24/22  1910 10/24/22  2232 10/25/22  0426 10/25/22  1048   WBC 10.2  --   --   --    HGB 6.0* 5.3* 6.4* 7.5*   HCT 21.2* 19.1* 22.1* 25.0*     --   --   --      Recent Labs     10/24/22  1910 10/25/22  0426    137   K 4.0 3.6    103   CO2 23 24   BUN 32* 25*   CREATININE 1.0 1.0   CALCIUM 9.2 8.4     Recent Labs     10/24/22  1910   AST 19   ALT 16   BILITOT 0.7   ALKPHOS 43     Recent Labs     10/24/22  2232   INR 1.20*     Recent Labs     10/24/22  1910   TROPONINI <0.01       Urinalysis:      Lab Results   Component Value Date/Time    NITRU Negative 03/06/2022 05:30 PM    WBCUA 0-2 03/06/2022 05:30 PM    BACTERIA Rare 03/06/2022 05:30 PM    RBCUA None seen 03/06/2022 05:30 PM    BLOODU Negative 03/06/2022 05:30 PM    SPECGRAV 1.015 03/06/2022 05:30 PM    GLUCOSEU Negative 03/06/2022 05:30 PM       Radiology:  XR CHEST (2 VW)   Final Result   Mild left basilar airspace opacities which could represent atelectasis or   pneumonia in the appropriate clinical setting. CT ABDOMEN PELVIS W IV CONTRAST Additional Contrast? Oral    (Results Pending)           Assessment/Plan:    Active Hospital Problems    Diagnosis     Anemia [D64.9]      Priority: Medium    Mixed hyperlipidemia [E78.2]     Type 2 diabetes mellitus ([facility] [address] [E11.9]     Essential hypertension [I10]        The patient is a pleasant [de-identified] Y F with a h/o HTN, HLD, DM2, and bladder cancer.   In 3/2022 she had some nausea and was seen in our ED. A segment of her small bowel looked abnormal on CT - 2.5 cm mass (carcinoid tumor?), vs collapsed bowel, vs tethering. The patient was discharged home with GI referral.  Dr. Kishore Estrella arranged for an outpatient CTE a few days later. It showed a focal area of small bowel narrowing and fat stranding in that area, but no mass. No further workup was pursued and the patient seemed to be doing OK, or at least not complaining to her family about anything. For the last few months, however, she has become more and more intolerant of exertion. Any little activity really wears her out. She denies chest pain or kae dyspnea. She decided to come to the ED to get checked out, and Hb was surprisingly 5.3. It looks like her baseline has been about 11 for the last few years, most recently in 3/2022. Very microcytic. The patient hasn't noticed any blood loss. Denies melena. FOBT negative in the ED. She says she had a normal colonoscopy in 2016. Hospital medicine was called for admission. Acute on chronic microcytic anemia. Required 2u pRBCs on admission. Awaiting iron labs, also B12, folate, TSH, LDH, haptoglobin. - strongly suspect iron deficiency. In light of her previously abnormal CT's, f/u another. Patient has not been losing weight. GI consulted for continuity and consideration of either inpatient or outpatient endoscop(ies). If EGD is performed then biopsy to r/o celiac disease might be helpful. - Labs aren't really suggestive of MDS or hematologic malignancy, but if not etiology is apparent based on micronutrient labs then outpatient hematology referral would be reasonable. HTN. Her HR hovers in the mid 50's, patient says this is baseline. In light of her exertional intolerance, decreased her atenolol. Continue lisinopril. Started chlorthalidone. Her last BP was also high at her clinic appointment over a year ago. F/u with PCP. HLD. Statin. Hypothyroidism. Clinically euthyroid. Continue home dose of levothyroxine. H/o DM2. Last A1c seems to be 6.4 in 2018. Metformin and glipizide are on her outpatient meds list.  F/u repeat A1c. SSI while here. Depression. Escitalopram.        DVT Prophylaxis: SCDs  Diet: ADULT DIET; Regular; 5 carb choices (75 gm/meal)  Code Status: Full Code  PT/OT Eval Status: not indicated    Dispo - perhaps 10/26, pending GI input and stable Hb. She lives at home.      Appropriate for A1 Discharge Unit: Dorothy Gallardo MD

## 2022-10-25 NOTE — ED PROVIDER NOTES
Date    Cancer [facility])     bladder    Diabetes mellitus ([facility] [address]     Hyperlipidemia     Hypertension     Seasonal allergies     Thyroid disease          Procedure Laterality Date    COLONOSCOPY  2002    normal per pt    COLONOSCOPY  2016    normal    HYSTERECTOMY      OOPHORECTOMY         Medications:  Previous Medications    ASPIRIN 81 MG EC TABLET    Take 81 mg by mouth daily    ATENOLOL (TENORMIN) 100 MG TABLET    Take 100 mg by mouth daily    CALCIUM CARBONATE (CALTRATE 600 PO)    Take by mouth 2 times daily    ESCITALOPRAM (LEXAPRO) 10 MG TABLET    Take 10 mg by mouth daily    GLIPIZIDE (GLUCOTROL XL) 2.5 MG EXTENDED RELEASE TABLET    Take 2.5 mg by mouth daily    GLUCOSAMINE 500 MG CAPS    Take by mouth    HYDROCHLOROTHIAZIDE (HYDRODIURIL) 25 MG TABLET    Take 25 mg by mouth daily    LATANOPROST (XALATAN) 0.005 % OPHTHALMIC SOLUTION    1 drop nightly    LEVOTHYROXINE (SYNTHROID) 75 MCG TABLET    Take 100 mcg by mouth Daily     LISINOPRIL (PRINIVIL;ZESTRIL) 40 MG TABLET    Take 40 mg by mouth nightly    METFORMIN (GLUCOPHAGE) 1000 MG TABLET    Take 1,000 mg by mouth 3 times daily     MIRTAZAPINE (REMERON) 15 MG TABLET    Take 7.5 mg by mouth nightly    MONTELUKAST (SINGULAIR) 10 MG TABLET    Take 10 mg by mouth nightly    ONDANSETRON (ZOFRAN ODT) 4 MG DISINTEGRATING TABLET    Take 1 tablet by mouth every 8 hours as needed for Nausea    SIMVASTATIN (ZOCOR) 10 MG TABLET    Take 10 mg by mouth nightly         Review of Systems:  (2-9 systems needed)  Review of Systems   Constitutional:  Positive for fatigue. Negative for chills and fever. HENT:  Negative for congestion, nosebleeds and sore throat. Eyes:  Negative for visual disturbance. Respiratory:  Positive for shortness of breath. Negative for cough and chest tightness. Cardiovascular:  Negative for chest pain and palpitations. Gastrointestinal:  Negative for abdominal pain, diarrhea, nausea and vomiting.    Genitourinary:  Negative for dysuria, frequency, hematuria and urgency. Musculoskeletal:  Negative for back pain and neck pain. Skin:  Negative for rash. Neurological:  Positive for weakness and light-headedness. Negative for dizziness and headaches. \"Positives and Pertinent negatives as per HPI\"    Physical Exam:  Physical Exam  Vitals and nursing note reviewed. Constitutional:       Appearance: Normal appearance. She is not diaphoretic. HENT:      Head: Normocephalic and atraumatic. Nose: Nose normal.      Mouth/Throat:      Mouth: Mucous membranes are moist.   Eyes:      General:         Right eye: No discharge. Left eye: No discharge. Extraocular Movements: Extraocular movements intact. Pupils: Pupils are equal, round, and reactive to light. Cardiovascular:      Rate and Rhythm: Normal rate and regular rhythm. Pulses: Normal pulses. Heart sounds: Normal heart sounds. No murmur heard. No friction rub. No gallop. Pulmonary:      Effort: Pulmonary effort is normal. No respiratory distress. Breath sounds: Normal breath sounds. No stridor. No wheezing, rhonchi or rales. Abdominal:      General: Abdomen is flat. Palpations: Abdomen is soft. Tenderness: no abdominal tenderness There is no guarding or rebound. Genitourinary:     Rectum: Guaiac result negative. Musculoskeletal:         General: Normal range of motion. Cervical back: Normal range of motion and neck supple. Right lower leg: No edema. Left lower leg: No edema. Skin:     General: Skin is warm and dry. Coloration: Skin is not pale. Neurological:      General: No focal deficit present. Mental Status: She is alert and oriented to person, place, and time.    Psychiatric:         Mood and Affect: Mood normal.         Behavior: Behavior normal.       MEDICAL DECISION MAKING    Vitals:    Vitals:    10/24/22 1856 10/24/22 2100 10/24/22 2201   BP: (!) 166/46 (!) 188/65 (!) 163/53   Pulse: 61 66 57 Resp: 16 20 16   Temp: 98.6 °F (37 °C)     TempSrc: Oral     SpO2: 99% 99% 97%   Weight: 168 lb 3.2 oz (76.3 kg)     Height: 5' 4\" (1.626 m)         LABS:  Labs Reviewed   CBC WITH AUTO DIFFERENTIAL - Abnormal; Notable for the following components:       Result Value    RBC 3.39 (*)     Hemoglobin 6.0 (*)     Hematocrit 21.2 (*)     MCV 62.7 (*)     MCH 17.6 (*)     MCHC 28.1 (*)     RDW 22.0 (*)     All other components within normal limits    Narrative:     Adriana Jayant tel. [phone],  Hematology results called to and read back by Jose Bonilla RN, 10/24/2022  20:35, by [facility]   COMPREHENSIVE METABOLIC PANEL - Abnormal; Notable for the following components:    Glucose 101 (*)     BUN 32 (*)     Est, Glom Filt Rate 57 (*)     All other components within normal limits   TROPONIN   BLOOD OCCULT STOOL SCREEN #1    Narrative:     ORDER#: [id number]                          ORDERED BY: Zan Sheridan  SOURCE: Stool                              COLLECTED:  10/24/22 21:24  ANTIBIOTICS AT ASA.:                      RECEIVED :  10/24/22 21:29   PROTIME-INR   IRON AND TIBC   VITAMIN B12 & FOLATE   HEMOGLOBIN AND HEMATOCRIT   TYPE AND SCREEN   PREPARE RBC (CROSSMATCH)        Remainder of labs reviewed and were negative at this time or not returned at the time of this note. RADIOLOGY:   Non-plain film images such as CT, Ultrasound and MRI are read by the radiologist. OMKAR Monet have directly visualized the radiologic plain film image(s) with the below findings:      Interpretation per the Radiologist below, if available at the time of this note:    XR CHEST (2 VW)   Final Result   Mild left basilar airspace opacities which could represent atelectasis or   pneumonia in the appropriate clinical setting. XR CHEST (2 VW)    Result Date: 10/24/2022  EXAMINATION: TWO XRAY VIEWS OF THE CHEST 10/24/2022 7:34 pm COMPARISON: None.  HISTORY: ORDERING SYSTEM PROVIDED HISTORY: shortness of breath TECHNOLOGIST PROVIDED HISTORY: Reason for exam:->shortness of breath Reason for Exam: Shortness of Breath; Pharyngitis FINDINGS: Mild cardiomegaly. Mild left basilar airspace opacities. No pneumothorax or pleural effusion     Mild left basilar airspace opacities which could represent atelectasis or pneumonia in the appropriate clinical setting. MEDICAL DECISION MAKING / ED COURSE:      Is this patient to be included in the SEP-1 Core Measure due to severe sepsis or septic shock? No   Exclusion criteria - the patient is NOT to be included for SEP-1 Core Measure due to:  2+ SIRS criteria are not met      PROCEDURES:   Procedures    None    Patient was given:  Medications   0.9 % sodium chloride infusion (has no administration in time range)   0.9 % sodium chloride infusion (has no administration in time range)       Patient was evaluated in the emergency department today for shortness of breath and fatigue. She initially was evaluated in the triage area and protocol orders were placed. She has been placed back down to the lobby. Work-up results include:  CBC concerning for hemoglobin of 6.0. CMP without acute electrolyte abnormality. Renal function well-maintained. Troponins less than 0.01  Type and screen is a positive. Chest x-ray shows mild left basilar airspace opacity which could represent atelectasis versus pneumonia. EKG was interpreted by attending physician. Stool occult was negative    A discussion was had with the patient regarding her lab and imaging results. She does agree to blood transfusion and we will plan to admit to the hospital for acute symptomatic anemia. She does not have any history of GI bleed and is not on a blood thinner. Patient is in agreement treatment plan, all questions are answered and hospitalist is consulted at this time. The patient tolerated their visit well. I evaluated the patient. The physician was available for consultation as needed.   The patient and / or the family were informed of the results of any tests, a time was given to answer questions, a plan was proposed and they agreed with plan. I am the Primary Clinician of Record. CLINICAL IMPRESSION:  1. Signs and symptoms of anemia    2.  Dyspnea on exertion      Results for orders placed or performed during the hospital encounter of 10/24/22   CBC with Auto Differential   Result Value Ref Range    WBC 10.2 4.0 - 11.0 K/uL    RBC 3.39 (L) 4.00 - 5.20 M/uL    Hemoglobin 6.0 (LL) 12.0 - 16.0 g/dL    Hematocrit 21.2 (L) 36.0 - 48.0 %    MCV 62.7 (L) 80.0 - 100.0 fL    MCH 17.6 (L) 26.0 - 34.0 pg    MCHC 28.1 (L) 31.0 - 36.0 g/dL    RDW 22.0 (H) 12.4 - 15.4 %    Platelets 404 030 - 934 K/uL    MPV 8.2 5.0 - 10.5 fL    SLIDE REVIEW see below     Path Consult Yes     Neutrophils % 70.4 %    Lymphocytes % 15.9 %    Monocytes % 10.4 %    Eosinophils % 2.0 %    Basophils % 1.3 %    Neutrophils Absolute 7.2 1.7 - 7.7 K/uL    Lymphocytes Absolute 1.6 1.0 - 5.1 K/uL    Monocytes Absolute 1.1 0.0 - 1.3 K/uL    Eosinophils Absolute 0.2 0.0 - 0.6 K/uL    Basophils Absolute 0.1 0.0 - 0.2 K/uL   Comprehensive Metabolic Panel   Result Value Ref Range    Sodium 136 136 - 145 mmol/L    Potassium 4.0 3.5 - 5.1 mmol/L    Chloride 100 99 - 110 mmol/L    CO2 23 21 - 32 mmol/L    Anion Gap 13 3 - 16    Glucose 101 (H) 70 - 99 mg/dL    BUN 32 (H) 7 - 20 mg/dL    Creatinine 1.0 0.6 - 1.2 mg/dL    Est, Glom Filt Rate 57 (A) >60    Calcium 9.2 8.3 - 10.6 mg/dL    Total Protein 6.4 6.4 - 8.2 g/dL    Albumin 4.2 3.4 - 5.0 g/dL    Albumin/Globulin Ratio 1.9 1.1 - 2.2    Total Bilirubin 0.7 0.0 - 1.0 mg/dL    Alkaline Phosphatase 43 40 - 129 U/L    ALT 16 10 - 40 U/L    AST 19 15 - 37 U/L   Troponin   Result Value Ref Range    Troponin <0.01 <0.01 ng/mL   Blood Occult Stool Screen #1   Result Value Ref Range    Occult Blood Screening Result: Negative  Normal range: Negative      Protime-INR   Result Value Ref Range    Protime 15. 2 (H) 11.7 - 14.5 sec    INR 1.20 (H) 0.87 - 1.14   Hemoglobin and Hematocrit   Result Value Ref Range    Hemoglobin 5.3 (LL) 12.0 - 16.0 g/dL    Hematocrit 19.1 (LL) 36.0 - 48.0 %   EKG 12 Lead   Result Value Ref Range    Ventricular Rate 62 BPM    Atrial Rate 62 BPM    P-R Interval 160 ms    QRS Duration 82 ms    Q-T Interval 476 ms    QTc Calculation (Bazett) 483 ms    P Axis 18 degrees    R Axis -6 degrees    T Axis 13 degrees    Diagnosis       Sinus rhythm with marked sinus arrhythmiaNonspecific ST abnormalityAbnormal ECGWhen compared with ECG of 06-MAR-2022 18:23,No significant change was found   TYPE AND SCREEN   Result Value Ref Range    ABO/Rh A POS     Antibody Screen NEG    PREPARE RBC (CROSSMATCH), 1 Units   Result Value Ref Range    Product Code Blood Bank [id number]     Description Blood Bank Red Blood Cells, Leuko-reduced     Unit Number [id number]     Dispense Status Blood Bank issued        I spoke with [facility]. [facility] hospitalist. We discussed the history, physical exam, diagnostics, as well as their emergency department course. Based upon that discussion, we've decided to admit Sidra Henderson for further observation and evaluation of Ronald Alfaro's   1. Signs and symptoms of anemia    2. Dyspnea on exertion    . DISPOSITION Decision To Admit 10/24/2022 10:05:51 PM      PATIENT REFERRED TO:  No follow-up provider specified.     DISCHARGE MEDICATIONS:  New Prescriptions    No medications on file       DISCONTINUED MEDICATIONS:  Discontinued Medications    No medications on file              (Please note the MDM and HPI sections of this note were completed with a voice recognition program.  Efforts were made to edit the dictations but occasionally words are mis-transcribed.)    Electronically signed, Jose Alejandro Salazar,          Jose Alejandro Salazar  10/25/22 0602

## 2022-10-25 NOTE — ED PROVIDER NOTES
I independently performed a history and physical on [facility]. All diagnostic, treatment, and disposition decisions were made by myself in conjunction with the advanced practice provider. I personally saw the patient and performed a substantive portion of the visit including all aspects of the medical decision making. For further details of Willardgail Martinezar [facility] emergency department encounter, please see OMKAR Curtis's documentation. Patient is an 62-year-old female presenting today due to generalized worsening shortness of breath for the last couple of months although worse in the last week and also having a sore throat. She denies any chest pain or abdominal pain. She denies any rectal bleeding or vaginal bleeding. She denies any abdominal distention. She denies any history of cardiac murmur. If she needed a blood transfusion, she would be okay with this. Due to concern for worsening shortness of breath and fatigue, she was brought to the emergency department for further evaluation. She denies any fevers or chills. Physical:   Gen: No acute distress. AOx3. Pallor. Very pleasant during evaluation   Psych: Normal mood and affect  HEENT: NCAT, MMM  Neck: supple  Cardiac: RRR, pulses 2+ in upper extremities  Lungs: C2AB, no R/R/W  Abdomen: soft and nontender with no R/D/G  Neuro: no focal neuro deficits with strength and sensation 5/5 in all 4 extremities, GCS = 15    The Ekg interpreted by me shows  normal sinus rhythm and sinus arrhythmia with a rate of 62  Axis is   Normal  QTc is   borderline prolonged QT  Intervals and Durations are unremarkable. ST Segments: no acute change and nonspecific changes  No significant change from prior EKG dated - 3/6/22  No STEMI     I was the primary provider for the patient along with physician assistant Juanjose Abebe.       Critical Care Time:    I personally saw the patient and independently provided 15 minutes of non-concurrent critical care out of the total shared critical care time provided. Includes examination, lab interpretation, charting, treating for symptomatic anemia needing blood transfusion  Excludes separate billable procedures. Patient at risk for serious decompensation if not treated for this life-threatening condition. MDM: Patient was evaluated due to worsening shortness of breath for the past couple of months along with increased fatigue along with a sore throat. She denies any significant concerns with her throat when I was in the room and had no stridor or trouble breathing or drooling. Her hemoglobin did come back critically low and based on her being symptomatic with exertion, I do believe a blood transfusion is necessary. At this time, we are unsure what is causing her low hemoglobin and this will need to be further evaluated in the hospital.  She is stable for the floor and agrees with this plan. Troponin was negative and story not suggestive of acute coronary syndrome. She denies any pain with breathing and story not suggestive of pulmonary embolism or pneumonia.         Bhavna Shaw MD  10/25/22 6492

## 2022-10-25 NOTE — CARE COORDINATION
CASE MANAGEMENT INITIAL ASSESSMENT    Reviewed chart and completed assessment with patient at bedside  Family present:  yes, daughter, Silvia Goldsmith    Explained Case Management role/services. yes    Primary contact information: daughter    Health Care Decision Maker :   Primary Decision Maker: Vidal Brunner - Child - [phone]    Secondary Decision Maker: Sharri Fu - Child - [phone]    Secondary Decision Maker: Bev Stevens Child - [phone]        Admit date/status: IPA 10/24/22  Diagnosis: dyspnea on exertion. Anemia     Is this a Readmission?:  No      Insurance: [employer] Medicare     Precert required for SNF: Yes       3 night stay required: No    Living arrangements, Adls, care needs, prior to admission: lives alone in single story home. IPTA. Active     Durable Medical Equipment at home:  Walker__Cane__RTS__ BSC__Shower Chair__  02__ HHN__ CPAP__  BiPap__  Hospital Bed__ W/C___ Other_____    Services in the home and/or outpatient, prior to admission: none    Current PCP: Giovany Acuna             Medications: Prescription coverage? Yes Will pt require financial assistance with medications No     Transportation needs: pt states family can provide     PT/OT recs: [facility] Exemption Notification (HEN): na    Barriers to discharge: none    Plan/comments: pt intends to dc home without needs. Please consult CM team if needs arise.       Kathrine Ku RN

## 2022-10-25 NOTE — CONSULTS
Farhat Rowland is a [de-identified] y.o. female asked to see us in consultation by  eJnn Carr MD & Melissa Esparza MD for evaluation of SHARON. Ms. Rodrick Sesay is an 51-year-old female with past medical history of bladder cancer, DM, HTN, HLD and thyroid disease who presents to the ER with shortness of breath and fatigue. Symptoms have been progressive over the past several months. She was recently started on Lexapro as it was felt her symptoms may be secondary to depression. Her Hgb on admission yesterday was found to be 6 yesterday, down from 11.6 seven months ago. She denies rectal bleeding, hematemesis, melena, hematuria and epistaxis. No weight loss or change in bowel habits. She does not have a family history of colon cancer. She had abnormal CT imaging in March that questioned a mass with in the left mid abdomen. Follow-up A CT enterography confirmed an abnormality within the left upper quadrant described as a focal area of small bowel narrowing with surrounding fat stranding in the omentum mesentery. There was no mass-effect seen. She had a colonoscopy in 2016 that was unremarkable. No prior EGDs.           Medications Prior to Admission: ondansetron (ZOFRAN ODT) 4 MG disintegrating tablet, Take 1 tablet by mouth every 8 hours as needed for Nausea (Patient not taking: Reported on 10/25/2022)  glipiZIDE (GLUCOTROL XL) 2.5 MG extended release tablet, Take 2.5 mg by mouth daily  aspirin 81 MG EC tablet, Take 81 mg by mouth daily  Glucosamine 500 MG CAPS, Take by mouth  mirtazapine (REMERON) 15 MG tablet, Take 7.5 mg by mouth nightly (Patient not taking: No sig reported)  escitalopram (LEXAPRO) 10 MG tablet, Take 10 mg by mouth daily  latanoprost (XALATAN) 0.005 % ophthalmic solution, 1 drop nightly (Patient not taking: Reported on 10/25/2022)  atenolol (TENORMIN) 100 MG tablet, Take 100 mg by mouth daily  montelukast (SINGULAIR) 10 MG tablet, Take 10 mg by mouth nightly  hydrochlorothiazide (HYDRODIURIL) 25 MG tablet, Take 25 mg by mouth daily  levothyroxine (SYNTHROID) 75 MCG tablet, Take 100 mcg by mouth Daily   simvastatin (ZOCOR) 10 MG tablet, Take 10 mg by mouth nightly  lisinopril (PRINIVIL;ZESTRIL) 40 MG tablet, Take 40 mg by mouth nightly  metFORMIN (GLUCOPHAGE) 1000 MG tablet, Take 1,000 mg by mouth 3 times daily   Calcium Carbonate (CALTRATE 600 PO), Take by mouth 2 times daily    Medication:   atorvastatin  10 mg Oral Daily    montelukast  10 mg Oral Nightly    lisinopril  40 mg Oral Nightly    levothyroxine  100 mcg Oral Daily    latanoprost  1 drop Both Eyes Nightly    escitalopram  20 mg Oral Daily    aspirin  81 mg Oral Daily    sodium chloride flush  10 mL IntraVENous 2 times per day    insulin lispro  0-8 Units SubCUTAneous TID WC    insulin lispro  0-4 Units SubCUTAneous Nightly    [START ON 10/26/2022] atenolol  50 mg Oral Daily    chlorthalidone  25 mg Oral Daily      dextrose      sodium chloride      sodium chloride      sodium chloride         Allergies: Allergies   Allergen Reactions    Z-Alvarado [Azithromycin] Hives       Immunizations: There is no immunization history for the selected administration types on file for this patient. Family history, past medical history, and  social  history  are  reviewed as  below.   Past Medical  History:  Past Medical History:   Diagnosis Date    Cancer ([facility]     bladder    Diabetes mellitus ([facility]     Hyperlipidemia     Hypertension     Seasonal allergies     Thyroid disease        Past  Surgical History:  Past Surgical History:   Procedure Laterality Date    COLONOSCOPY  2002    normal per pt    COLONOSCOPY  2016    normal    HYSTERECTOMY      OOPHORECTOMY         Family  History:  Family History   Problem Relation Age of Onset    Heart Disease Mother     Cancer Father         lymphoma    Diabetes Father        Social History:  Social History Tobacco Use    Smoking status: Never    Smokeless tobacco: Never   Vaping Use    Vaping Use: Never used   Substance Use Topics    Alcohol use: No       ROS  Constitutional:  Denies fever,sweats, chills or weight loss, + fatigue, weakness   Eyes:  Denies change in visual acuity   HENT:  Denies hearing loss or dizziness  Respiratory:  Denies cough or shortness of breath   Cardiovascular:  Denies edema or chest pain  :  Denies dysuria, hematuria, urgency or frequency  Musculoskeletal:  Denies back pain or joint pain   Integument:  Denies rash   Neurologic:  Denies headache, previous stroke, TIA, confusion   Endocrine:  Denies polyuria or polydipsia   Lymphatic:  Denies swollen glands   Psychiatric:  Denies depression or anxiety   Hematologic: +  previous anemia or easy bruising    All other review of systems negative, except for those noted. PHYSICAL EXAM:   VITAL SIGNS: BP (!) 145/73   Pulse 57   Temp 98.6 °F (37 °C) (Oral)   Resp 16   Ht 5' 4\" (1.626 m)   Wt 168 lb 3.2 oz (76.3 kg)   SpO2 94%   BMI 28.87 kg/m²   Date 10/25/22 0000 - 10/25/22 2359   Shift 5679-1429 0175-2238 8594-1285 24 Hour Total   INTAKE   Blood 153 451.7  604.7   Shift Total(mL/kg) 153(2) 451.7(5.9)  604.7(7.9)   OUTPUT   Shift Total(mL/kg)       Weight (kg) 76.3 76.3 76.3 76.3       Constitutional: Well developed. Well nourished. Non-toxic appearance. No acute distress. HENT: Normocephalic. Atraumatic. Bilateral external ears normal, Oropharynx moist.  No oral exudate. Nose normal.   Eyes: No  Scleral icterus   Neck: No  Cervical or  supraclavicular  nodes   Lymphatic: No lymphadenopathy noted. Cardiovascular: RRR  Thorax & Lungs: Non labored at rest, no respiratory distress  Abdomen:  soft, NT. No guarding, rebound tenderness. No hepatomegaly. No splenomegaly. No ascites  Rectal:  Deferred. Skin: Warm, dry. No erythema. No rash. Extremities: No edema.   Neurologic: Alert & oriented x 3    RESULTS  Lab Results Component Value Date    ALT 16 10/24/2022    AST 19 10/24/2022    ALKPHOS 43 10/24/2022    PROT 6.4 10/24/2022    LABALBU 4.2 10/24/2022    INR 1.20 (H) 10/24/2022    LIPASE 71.0 (H) 03/06/2022     Lab Results   Component Value Date    WBC 10.2 10/24/2022    HGB 7.5 (L) 10/25/2022    HCT 25.0 (L) 10/25/2022    MCV 62.7 (L) 10/24/2022     10/24/2022     Lab Results   Component Value Date    CREATININE 1.0 10/25/2022    BUN 25 (H) 10/25/2022     10/25/2022    K 3.6 10/25/2022     10/25/2022    CO2 24 10/25/2022       IMAGES  [unfilled]    Differential diagnosis of iron deficiency anemia    includes GI blood loss. Colorectal polyps, colorectal carcinoma, arteriovenous malformations, Inflammatory bowel disease, colonic or intestinal ulcer,Peptic ulcer disease, gastroesophageal reflux disease, and gastritis are all in the differential diagnosis. Iron malabsorption secondary to celiac disease would also be a consideration. Assesment:  1. Profound Iron deficiency anemia without overt signs of GI bleeding. No recent EGDs. Last colonoscopy 2016.  2. Abnormal CT imaging of the small bowel. Plan:  1. Recommend push enteroscopy. Spoke with Dr. Arleen Lay who has availability to perform small bowel balloon enteroscopy Monday at [address] Patient is in agreement with this plan. Will await results of Ct imaging today and repeat H&H in AM.   Once stabilized would be okay to discharge from our standpoint. Recommend Venofer infusions while here. Do not start oral iron. Would like this held for her endoscopy. Thank you for permitting us to participate in the care of this patient. Please do not hesitate to call with questions or concerns. 1.  The patient indicates understanding of these issues and agrees with the plan. 2.  I reviewed the patient's medical information and medical history.    3.  I have reviewed the past medical, family, and social history sections including the medications and allergies listed in the above medical record. Thank you for permitting us to participate in the care of this patient.       Electronically signed by: OFELIA San 10/25/2022 3:09 PM

## 2022-10-25 NOTE — ED NOTES
Pt ambulated from lobby to room. Pt short of breath and fatigued upon ambulation.  VSS once placed in room     Arlys Sever, [address] [facility]  10/24/22 4060

## 2022-10-25 NOTE — ED NOTES
An instant wave free ratio (iFR) was measured in the left anterior descending artery using a (Guidewire Verrata + 3cm 185cm J Crv Flat .014in) pressure wire.  Pt transported to  with all belongings in stable condition at this time     Nicolle Ramsey RN  10/25/22 0004

## 2022-10-26 LAB
ANION GAP SERPL CALCULATED.3IONS-SCNC: 9 MMOL/L (ref 3–16)
BASOPHILS ABSOLUTE: 0.1 K/UL (ref 0–0.2)
BASOPHILS RELATIVE PERCENT: 1 %
BUN BLDV-MCNC: 15 MG/DL (ref 7–20)
CALCIUM SERPL-MCNC: 8.4 MG/DL (ref 8.3–10.6)
CHLORIDE BLD-SCNC: 105 MMOL/L (ref 99–110)
CO2: 25 MMOL/L (ref 21–32)
CREAT SERPL-MCNC: 0.9 MG/DL (ref 0.6–1.2)
EOSINOPHILS ABSOLUTE: 0.4 K/UL (ref 0–0.6)
EOSINOPHILS RELATIVE PERCENT: 4.2 %
ESTIMATED AVERAGE GLUCOSE: 116.9 MG/DL
GFR SERPL CREATININE-BSD FRML MDRD: >60 ML/MIN/{1.73_M2}
GLUCOSE BLD-MCNC: 115 MG/DL (ref 70–99)
GLUCOSE BLD-MCNC: 126 MG/DL (ref 70–99)
GLUCOSE BLD-MCNC: 153 MG/DL (ref 70–99)
GLUCOSE BLD-MCNC: 163 MG/DL (ref 70–99)
GLUCOSE BLD-MCNC: 99 MG/DL (ref 70–99)
HAPTOGLOBIN: 122 MG/DL (ref 30–200)
HBA1C MFR BLD: 5.7 %
HCT VFR BLD CALC: 25 % (ref 36–48)
HEMATOLOGY PATH CONSULT: NO
HEMOCCULT SP2 STL QL: NORMAL
HEMOGLOBIN: 7.5 G/DL (ref 12–16)
LYMPHOCYTES ABSOLUTE: 1.1 K/UL (ref 1–5.1)
LYMPHOCYTES RELATIVE PERCENT: 10.7 %
MAGNESIUM: 1.9 MG/DL (ref 1.8–2.4)
MCH RBC QN AUTO: 20.1 PG (ref 26–34)
MCHC RBC AUTO-ENTMCNC: 29.8 G/DL (ref 31–36)
MCV RBC AUTO: 67.3 FL (ref 80–100)
MONOCYTES ABSOLUTE: 1.2 K/UL (ref 0–1.3)
MONOCYTES RELATIVE PERCENT: 12 %
NEUTROPHILS ABSOLUTE: 7.4 K/UL (ref 1.7–7.7)
NEUTROPHILS RELATIVE PERCENT: 72.1 %
PDW BLD-RTO: 24.5 % (ref 12.4–15.4)
PERFORMED ON: ABNORMAL
PERFORMED ON: NORMAL
PLATELET # BLD: 340 K/UL (ref 135–450)
PMV BLD AUTO: 8.2 FL (ref 5–10.5)
POTASSIUM REFLEX MAGNESIUM: 3.4 MMOL/L (ref 3.5–5.1)
RBC # BLD: 3.71 M/UL (ref 4–5.2)
SODIUM BLD-SCNC: 139 MMOL/L (ref 136–145)
SOLUBLE TRANSFERRIN RECEPT: 24 MG/L (ref 1.9–4.4)
WBC # BLD: 10.3 K/UL (ref 4–11)

## 2022-10-26 PROCEDURE — G0378 HOSPITAL OBSERVATION PER HR: HCPCS

## 2022-10-26 PROCEDURE — 80048 BASIC METABOLIC PNL TOTAL CA: CPT

## 2022-10-26 PROCEDURE — 99222 1ST HOSP IP/OBS MODERATE 55: CPT | Performed by: SURGERY

## 2022-10-26 PROCEDURE — 6360000002 HC RX W HCPCS: Performed by: NURSE PRACTITIONER

## 2022-10-26 PROCEDURE — 96365 THER/PROPH/DIAG IV INF INIT: CPT

## 2022-10-26 PROCEDURE — 6370000000 HC RX 637 (ALT 250 FOR IP): Performed by: INTERNAL MEDICINE

## 2022-10-26 PROCEDURE — 36415 COLL VENOUS BLD VENIPUNCTURE: CPT

## 2022-10-26 PROCEDURE — 1200000000 HC SEMI PRIVATE

## 2022-10-26 PROCEDURE — 2580000003 HC RX 258: Performed by: NURSE PRACTITIONER

## 2022-10-26 PROCEDURE — 2580000003 HC RX 258: Performed by: INTERNAL MEDICINE

## 2022-10-26 PROCEDURE — 83735 ASSAY OF MAGNESIUM: CPT

## 2022-10-26 PROCEDURE — 85025 COMPLETE CBC W/AUTO DIFF WBC: CPT

## 2022-10-26 PROCEDURE — 6370000000 HC RX 637 (ALT 250 FOR IP): Performed by: NURSE PRACTITIONER

## 2022-10-26 RX ORDER — POTASSIUM CHLORIDE 20 MEQ/1
20 TABLET, EXTENDED RELEASE ORAL ONCE
Status: COMPLETED | OUTPATIENT
Start: 2022-10-26 | End: 2022-10-26

## 2022-10-26 RX ADMIN — POTASSIUM CHLORIDE 20 MEQ: 1500 TABLET, EXTENDED RELEASE ORAL at 21:21

## 2022-10-26 RX ADMIN — LISINOPRIL 40 MG: 20 TABLET ORAL at 21:15

## 2022-10-26 RX ADMIN — LATANOPROST 1 DROP: 50 SOLUTION OPHTHALMIC at 21:17

## 2022-10-26 RX ADMIN — SODIUM CHLORIDE, PRESERVATIVE FREE 10 ML: 5 INJECTION INTRAVENOUS at 08:19

## 2022-10-26 RX ADMIN — ATORVASTATIN CALCIUM 10 MG: 10 TABLET, FILM COATED ORAL at 08:16

## 2022-10-26 RX ADMIN — MONTELUKAST SODIUM 10 MG: 10 TABLET, COATED ORAL at 21:15

## 2022-10-26 RX ADMIN — IRON SUCROSE 200 MG: 20 INJECTION, SOLUTION INTRAVENOUS at 16:23

## 2022-10-26 RX ADMIN — ATENOLOL 50 MG: 25 TABLET ORAL at 08:16

## 2022-10-26 RX ADMIN — CHLORTHALIDONE 25 MG: 25 TABLET ORAL at 08:16

## 2022-10-26 RX ADMIN — ESCITALOPRAM OXALATE 20 MG: 10 TABLET ORAL at 08:16

## 2022-10-26 RX ADMIN — ASPIRIN 81 MG: 81 TABLET, COATED ORAL at 08:16

## 2022-10-26 RX ADMIN — LEVOTHYROXINE SODIUM 100 MCG: 0.1 TABLET ORAL at 05:01

## 2022-10-26 NOTE — CONSULTS
mg, 1 mg, SubCUTAneous, PRN  dextrose 10 % infusion, , IntraVENous, Continuous PRN  sodium chloride flush 0.9 % injection 10 mL, 10 mL, IntraVENous, 2 times per day  sodium chloride flush 0.9 % injection 10 mL, 10 mL, IntraVENous, PRN  0.9 % sodium chloride infusion, , IntraVENous, PRN  ondansetron (ZOFRAN) injection 4 mg, 4 mg, IntraVENous, Q4H PRN  polyethylene glycol (GLYCOLAX) packet 17 g, 17 g, Oral, Daily PRN  acetaminophen (TYLENOL) tablet 650 mg, 650 mg, Oral, Q4H PRN **OR** acetaminophen (TYLENOL) suppository 650 mg, 650 mg, Rectal, Q4H PRN  insulin lispro (HUMALOG) injection vial 0-8 Units, 0-8 Units, SubCUTAneous, TID WC  insulin lispro (HUMALOG) injection vial 0-4 Units, 0-4 Units, SubCUTAneous, Nightly  atenolol (TENORMIN) tablet 50 mg, 50 mg, Oral, Daily  chlorthalidone (HYGROTON) tablet 25 mg, 25 mg, Oral, Daily  diatrizoate meglumine-sodium (GASTROGRAFIN) 66-10 % solution 16 mL, 16 mL, Oral, ONCE PRN  iron sucrose (VENOFER) 200 mg in sodium chloride 0.9 % 100 mL IVPB, 200 mg, IntraVENous, Q24H  0.9 % sodium chloride infusion, , IntraVENous, PRN  0.9 % sodium chloride infusion, , IntraVENous, PRN  Prior to Admission medications    Medication Sig Start Date End Date Taking?  Authorizing Provider   ondansetron (ZOFRAN ODT) 4 MG disintegrating tablet Take 1 tablet by mouth every 8 hours as needed for Nausea  Patient not taking: Reported on 10/25/2022 3/6/22   OFELIA Castillo - CNP   glipiZIDE (GLUCOTROL XL) 2.5 MG extended release tablet Take 2.5 mg by mouth daily    Historical Provider, MD   aspirin 81 MG EC tablet Take 81 mg by mouth daily    Historical Provider, MD   Glucosamine 500 MG CAPS Take by mouth    Historical Provider, MD   mirtazapine (REMERON) 15 MG tablet Take 7.5 mg by mouth nightly  Patient not taking: No sig reported    Historical Provider, MD   escitalopram (LEXAPRO) 10 MG tablet Take 10 mg by mouth daily    Historical Provider, MD   latanoprost (XALATAN) 0.005 % ophthalmic solution 1 drop nightly  Patient not taking: Reported on 10/25/2022    Historical Provider, MD   atenolol (TENORMIN) 100 MG tablet Take 100 mg by mouth daily    Historical Provider, MD   montelukast (SINGULAIR) 10 MG tablet Take 10 mg by mouth nightly    Historical Provider, MD   hydrochlorothiazide (HYDRODIURIL) 25 MG tablet Take 25 mg by mouth daily    Historical Provider, MD   levothyroxine (SYNTHROID) 75 MCG tablet Take 100 mcg by mouth Daily     Historical Provider, MD   simvastatin (ZOCOR) 10 MG tablet Take 10 mg by mouth nightly    Historical Provider, MD   lisinopril (PRINIVIL;ZESTRIL) 40 MG tablet Take 40 mg by mouth nightly    Historical Provider, MD   metFORMIN (GLUCOPHAGE) 1000 MG tablet Take 1,000 mg by mouth 3 times daily     Historical Provider, MD   Calcium Carbonate (CALTRATE 600 PO) Take by mouth 2 times daily    Historical Provider, MD        Allergies:  Z-ani [azithromycin]    Social History:   TOBACCO:   reports that she has never smoked. She has never used smokeless tobacco.  ETOH:   reports no history of alcohol use. DRUGS:   has no history on file for drug use. OCCUPATION:  Retired  Patient currently lives with family      Family History:        Problem Relation Age of Onset    Heart Disease Mother     Cancer Father         lymphoma    Diabetes Father        REVIEW OF SYSTEMS:  CONSTITUTIONAL:  negative except for fatigue  HEENT:  negative  RESPIRATORY:  negative except for shortness of breath  CARDIOVASCULAR:  negative  GASTROINTESTINAL:  negative except for   GENITOURINARY:  negative  HEMATOLOGIC/LYMPHATIC:  negative  NEUROLOGICAL:  Negative  * All other ROS reviewed and negative. PHYSICAL EXAM:  VITALS:  BP (!) 152/62   Pulse 62   Temp 98.2 °F (36.8 °C) (Oral)   Resp 18   Ht 5' 4\" (1.626 m)   Wt 168 lb 3.2 oz (76.3 kg)   SpO2 96%   BMI 28.87 kg/m²   24HR INTAKE/OUTPUT:    I/O last 3 completed shifts:   In: 604.7 [Blood:604.7]  Out: -   No intake/output data recorded. CONSTITUTIONAL:  alert, no apparent distress and normal weight  EYES:  PERRL, sclera clear  ENT:  Normocephalic,atraumatic, without obvious abnormality  NECK:  supple, symmetrical, trachea midline  LUNGS: Resp effort easy and unlabored  CARDIOVASCULAR:  NO JVD,    ABDOMEN:  soft, non-distended  MUSCULOSKELETAL: No clubbing or cyanosis, 0+ pitting edema lower extremities  NEUROLOGIC:  Mental Status Exam:  Level of Alertness:   awake  PSYCHIATRIC:   person, place, time  SKIN:  normal skin color, texture, turgor    DATA:    CBC:   Recent Labs     10/24/22  1910 10/24/22  2232 10/25/22  1048 10/25/22  1826 10/26/22  0552   WBC 10.2  --   --   --  10.3   HGB 6.0*   < > 7.5* 7.8* 7.5*   HCT 21.2*   < > 25.0* 26.1* 25.0*     --   --   --  340    < > = values in this interval not displayed. BMP:    Recent Labs     10/24/22  1910 10/25/22  0426 10/26/22  0552    137 139   K 4.0 3.6 3.4*    103 105   CO2 23 24 25   BUN 32* 25* 15   CREATININE 1.0 1.0 0.9   GLUCOSE 101* 162* 115*     Hepatic:   Recent Labs     10/24/22  1910   AST 19   ALT 16   BILITOT 0.7   ALKPHOS 43     Mag:      Recent Labs     10/26/22  0552   MG 1.90      Phos:   No results for input(s): PHOS in the last 72 hours. INR:   Recent Labs     10/24/22  2232   INR 1.20*       Radiology Review: Images personally reviewed by me, and I reviewed with staff Radiologist     CT OF THE ABDOMEN AND PELVIS WITH CONTRAST 10/25/2022 3:48 pm       TECHNIQUE:   CT of the abdomen and pelvis was performed with the administration of   intravenous contrast. Multiplanar reformatted images are provided for review. Automated exposure control, iterative reconstruction, and/or weight based   adjustment of the mA/kV was utilized to reduce the radiation dose to as low   as reasonably achievable. COMPARISON:   03/15/2022 and 03/06/2022.        HISTORY:   ORDERING SYSTEM PROVIDED HISTORY: abnormal CT in 3/2022, now severe   microcytic anemia. Any cancer, IBD, etc?   TECHNOLOGIST PROVIDED HISTORY:   Reason for exam:->abnormal CT in 3/2022, now severe microcytic anemia. Any   cancer, IBD, etc?   Additional Contrast?->Oral   Reason for Exam: anemia-weakness-low Hemoglobin   Additional signs and symptoms: sob on admission   Relevant Medical/Surgical History: gb removed-hysterectomy       FINDINGS:   Lower Chest: There are small bilateral pleural effusions. There is   subsegmental atelectasis at the lung bases. Organs: The liver, spleen, adrenal glands and pancreas appear normal.  The   gallbladder is contracted or absent. The kidneys appear normal.  There is no   ureteral stone or hydronephrosis. GI/Bowel: There is no bowel dilatation or bowel wall thickening. The   appendix appears normal.  There is mild colonic diverticulosis. There is a   moderate hiatal hernia. There is soft tissue attenuation in the mesentery   adjacent to small bowel loops in the left upper quadrant. This measures   approximately 3.4 x 1.7 cm versus 2.9 x 1.8 cm on the 03/06/2022 study by my   measurement. Pelvis: The bladder is unremarkable. Peritoneum/Retroperitoneum: The abdominal aorta is normal in caliber. There   are no enlarged lymph nodes. No fat stranding, free fluid, free air or focal   fluid collection is identified. Bones/Soft Tissues: There are no destructive bone lesions. Impression   1. Mass in the small bowel mesentery measuring up to 3.4 x 1.7 cm. This   measures slightly larger in maximum dimension compared to the prior studies. Carcinoid tumor is a possibility. Sclerosing mesenteritis is also a   consideration. 2. No acute findings elsewhere in the abdomen or pelvis. 3. Small bilateral pleural effusions. 4. Mild colonic diverticulosis. 5. Moderate hiatal hernia. 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   Presentation of symptomatic occult anemia would suggest a potential link to the mesenteric lesion (ie if there was erosion/ulceration into an adjacent small bowel loop and resultant occult bleeding) but her stools have been Heme (-) so far. I would agree w/ an appropriate GI endoscopic assessment for any sources of GI bleeding, including EGD and colonoscopy. I discussed the issues with the GI team:  ideally we could get the scopes done while she is here, on this admission. However, per GI, getting available Endo suite time may be difficult this week. I suppose with the overall lack of any acute GI symptoms related to the mass, it would reasonable to proceed with an outpatient GI workup. Depending on the findings she could then follow up with me to discuss surgical issues/plans further.     Electronically signed by Delroy James MD     [address] [facility]  [id number]

## 2022-10-26 NOTE — PROGRESS NOTES
PROGRESS NOTE    HPI: Raul Gatica is a(n)82 y.o. female admitted for work-up and treatment for Dyspnea on exertion [R06.09]  Anemia [D64.9]  Signs and symptoms of anemia [R68.89]. We are following for SHARON. Subjective:     No acute events overnight or complaints this morning. Objective:     I/O last 3 completed shifts: In: 604.7 [Blood:604.7]  Out: -       BP (!) 187/66   Pulse 58   Temp 98.2 °F (36.8 °C) (Oral)   Resp 16   Ht 5' 4\" (1.626 m)   Wt 168 lb 3.2 oz (76.3 kg)   SpO2 95%   BMI 28.87 kg/m²     Physical Exam:  HEENT: anicteric sclera, oropharyngeal membranes pink and moist.  Cor: RRR  Lungs: non-labored, no respiratory distress  Abdomen: soft, NT. No ascites. No hepatomegaly or splenomegaly  Extremities: no edema  Neuro: alert and oriented x 3, no asterixis      Results:   Lab Results   Component Value Date    ALT 16 10/24/2022    AST 19 10/24/2022    ALKPHOS 43 10/24/2022    PROT 6.4 10/24/2022    LABALBU 4.2 10/24/2022    INR 1.20 (H) 10/24/2022    LIPASE 71.0 (H) 03/06/2022     Lab Results   Component Value Date    WBC 10.3 10/26/2022    HGB 7.5 (L) 10/26/2022    HCT 25.0 (L) 10/26/2022    MCV 67.3 (L) 10/26/2022     10/26/2022     BUN/Cr/glu/ALT/AST/amyl/lip:  15/0.9/--/--/--/--/-- (10/26 3937)  [unfilled]      Impression:  1. Profound Iron deficiency anemia without overt signs of GI bleeding. No recent EGDs. Last colonoscopy 2016. S/p blood transfusions and started on Venofer yesterday. Hgb has stabilized. 2. Abnormal CT imaging - mass within the small bowel mesentery. Plan:  Reviewed CT results with patient. Push enteroscopy Monday as well as colonoscopy. Surgery consult placed. Office will call to schedule and provide directions. Continue Venofer here. No iron supplements at discharge.      Please do not hesitate to call with questions or concerns.       Electronically signed by: OFELIA San 10/26/2022 10:00 AM

## 2022-10-26 NOTE — PROGRESS NOTES
Hospitalist Progress Note      PCP: Jeana Cooper MD    Date of Admission: 10/24/2022    Chief Complaint: exertional intolerance    Hospital Course: [de-identified] Y F with a h/o HTN, HLD, DM2, and bladder cancer. In 3/2022 she had some nausea and was seen in our ED. A segment of her small bowel looked abnormal on CT - 2.5 cm mass (carcinoid tumor?), vs collapsed bowel, vs tethering. The patient was discharged home with GI referral.  Dr. Jordan Galdamez arranged for an outpatient CTE a few days later. It showed a focal area of small bowel narrowing and fat stranding in that area, but no mass. No further workup was pursued and the patient seemed to be doing OK, or at least not complaining to her family about anything. For the last few months, however, she has become more and more intolerant of exertion. Any little activity really wears her out. She denies chest pain or kae dyspnea. She decided to come to the ED to get checked out, and Hb was surprisingly 5.3. It looks like her baseline has been about 11 for the last few years, most recently in 3/2022. Very microcytic. The patient hasn't noticed any blood loss. Denies melena. FOBT negative in the ED. She says she had a normal colonoscopy in 2016. Hospital medicine was called for admission. Subjective: Resting in bed, NAD. Patient and family updated on plan of care.        Medications:  Reviewed    Infusion Medications    dextrose      sodium chloride      sodium chloride      sodium chloride       Scheduled Medications    atorvastatin  10 mg Oral Daily    montelukast  10 mg Oral Nightly    lisinopril  40 mg Oral Nightly    levothyroxine  100 mcg Oral Daily    latanoprost  1 drop Both Eyes Nightly    escitalopram  20 mg Oral Daily    aspirin  81 mg Oral Daily    sodium chloride flush  10 mL IntraVENous 2 times per day    insulin lispro  0-8 Units SubCUTAneous TID WC    insulin lispro  0-4 Units SubCUTAneous Nightly    atenolol  50 mg Oral Daily chlorthalidone  25 mg Oral Daily    iron sucrose (VENOFER) iv piggyback 100 mL (Admin over 60 minutes)  200 mg IntraVENous Q24H     PRN Meds: glucose, dextrose bolus **OR** dextrose bolus, glucagon (rDNA), dextrose, sodium chloride flush, sodium chloride, ondansetron, polyethylene glycol, acetaminophen **OR** acetaminophen, diatrizoate meglumine-sodium, sodium chloride, sodium chloride      Intake/Output Summary (Last 24 hours) at 10/26/2022 0855  Last data filed at 10/25/2022 0912  Gross per 24 hour   Intake 451.66 ml   Output --   Net 451.66 ml       Physical Exam Performed:    BP (!) 187/66   Pulse 58   Temp 98.2 °F (36.8 °C) (Oral)   Resp 16   Ht 5' 4\" (1.626 m)   Wt 168 lb 3.2 oz (76.3 kg)   SpO2 95%   BMI 28.87 kg/m²     General appearance: No apparent distress, appears stated age and cooperative. HEENT: Pupils equal, round, and reactive to light. Conjunctivae/corneas clear. Neck: Supple, with full range of motion. No jugular venous distention. Trachea midline. Respiratory:  Normal respiratory effort. Clear to auscultation, bilaterally without Rales/Wheezes/Rhonchi. Cardiovascular: Regular rate and rhythm with normal S1/S2 without murmurs, rubs or gallops. Abdomen: Soft, non-tender, non-distended with normal bowel sounds. Musculoskeletal: No clubbing, cyanosis or edema bilaterally. Full range of motion without deformity. Skin: Skin color, texture, turgor normal.  No rashes or lesions. Neurologic:  Neurovascularly intact without any focal sensory/motor deficits.  Cranial nerves: II-XII intact, grossly non-focal.  Psychiatric: Alert and oriented, thought content appropriate, normal insight  Capillary Refill: Brisk, 3 seconds, normal   Peripheral Pulses: +2 palpable, equal bilaterally       Labs:   Recent Labs     10/24/22  1910 10/24/22  2232 10/25/22  1048 10/25/22  1826 10/26/22  0552   WBC 10.2  --   --   --  10.3   HGB 6.0*   < > 7.5* 7.8* 7.5*   HCT 21.2*   < > 25.0* 26.1* 25.0*     -- --   --  340    < > = values in this interval not displayed. Recent Labs     10/24/22  1910 10/25/22  0426 10/26/22  0552    137 139   K 4.0 3.6 3.4*    103 105   CO2 23 24 25   BUN 32* 25* 15   CREATININE 1.0 1.0 0.9   CALCIUM 9.2 8.4 8.4     Recent Labs     10/24/22  1910   AST 19   ALT 16   BILITOT 0.7   ALKPHOS 43     Recent Labs     10/24/22  2232   INR 1.20*     Recent Labs     10/24/22  1910   TROPONINI <0.01       Urinalysis:      Lab Results   Component Value Date/Time    NITRU Negative 03/06/2022 05:30 PM    WBCUA 0-2 03/06/2022 05:30 PM    BACTERIA Rare 03/06/2022 05:30 PM    RBCUA None seen 03/06/2022 05:30 PM    BLOODU Negative 03/06/2022 05:30 PM    SPECGRAV 1.015 03/06/2022 05:30 PM    GLUCOSEU Negative 03/06/2022 05:30 PM       Radiology:  CT ABDOMEN PELVIS W IV CONTRAST Additional Contrast? Oral   Final Result   1. Mass in the small bowel mesentery measuring up to 3.4 x 1.7 cm. This   measures slightly larger in maximum dimension compared to the prior studies. Carcinoid tumor is a possibility. Sclerosing mesenteritis is also a   consideration. 2. No acute findings elsewhere in the abdomen or pelvis. 3. Small bilateral pleural effusions. 4. Mild colonic diverticulosis. 5. Moderate hiatal hernia. XR CHEST (2 VW)   Final Result   Mild left basilar airspace opacities which could represent atelectasis or   pneumonia in the appropriate clinical setting. Assessment/Plan:    Active Hospital Problems    Diagnosis     Anemia [D64.9]      Priority: Medium    Mixed hyperlipidemia [E78.2]     Type 2 diabetes mellitus ([address] [E11.9]     Essential hypertension [I10]      Acute on chronic microcytic anemia. SHARON,  Required 2u pRBCs on admission. iron labs: iron 23, iron sat 6, TIBC 393, B12 224 folate 17.12 TSH 3.41, LDH , haptoglobin. Patient has not been losing weight.   GI consulted for continuity and consideration of either inpatient or outpatient endoscop(ies). If EGD is performed then biopsy to r/o celiac disease might be helpful. CT abd/pelvis:Mass in the small bowel mesentery measuring up to 3.4 x 1.7 cm. This measures slightly larger in maximum dimension compared to the prior studies. Carcinoid tumor is a possibility. Gen surg consulted. HTN. HR hovers in the mid 50's, reported as baseline. In light of her exertional intolerance, decreased her atenolol. Continue lisinopril. Started chlorthalidone. Her last BP was also high at her clinic appointment over a year ago. F/u with PCP. HLD. Statin. Hypothyroidism. Clinically euthyroid. Continue home dose of levothyroxine. H/o DM2. Last A1c seems to be 6.4 in 2018. Metformin and glipizide are on her outpatient meds list. Repeat A1c 5.7 SSI while here. Depression. Escitalopram.     DVT Prophylaxis: SCD  Diet: ADULT DIET;  Regular; 5 carb choices (75 gm/meal)  Code Status: Full Code  PT/OT Eval Status: Ambulatory    Dispo - pending gen surg eval/GI recs    Appropriate for A1 Discharge Unit: No      Augusta Cortez, OFELIA - CNP

## 2022-10-27 VITALS
BODY MASS INDEX: 28.71 KG/M2 | TEMPERATURE: 97.9 F | OXYGEN SATURATION: 96 % | WEIGHT: 168.2 LBS | HEART RATE: 63 BPM | RESPIRATION RATE: 18 BRPM | HEIGHT: 64 IN | SYSTOLIC BLOOD PRESSURE: 173 MMHG | DIASTOLIC BLOOD PRESSURE: 56 MMHG

## 2022-10-27 LAB
ANION GAP SERPL CALCULATED.3IONS-SCNC: 8 MMOL/L (ref 3–16)
BUN BLDV-MCNC: 12 MG/DL (ref 7–20)
CALCIUM SERPL-MCNC: 8.8 MG/DL (ref 8.3–10.6)
CEA: 1 NG/ML (ref 0–5)
CHLORIDE BLD-SCNC: 106 MMOL/L (ref 99–110)
CO2: 28 MMOL/L (ref 21–32)
CREAT SERPL-MCNC: 0.9 MG/DL (ref 0.6–1.2)
GFR SERPL CREATININE-BSD FRML MDRD: >60 ML/MIN/{1.73_M2}
GLUCOSE BLD-MCNC: 116 MG/DL (ref 70–99)
GLUCOSE BLD-MCNC: 134 MG/DL (ref 70–99)
GLUCOSE BLD-MCNC: 98 MG/DL (ref 70–99)
HCT VFR BLD CALC: 26.7 % (ref 36–48)
HEMATOLOGY PATH CONSULT: NO
HEMOGLOBIN: 7.8 G/DL (ref 12–16)
MCH RBC QN AUTO: 20.1 PG (ref 26–34)
MCHC RBC AUTO-ENTMCNC: 29.3 G/DL (ref 31–36)
MCV RBC AUTO: 68.7 FL (ref 80–100)
PDW BLD-RTO: 24.5 % (ref 12.4–15.4)
PERFORMED ON: ABNORMAL
PERFORMED ON: ABNORMAL
PLATELET # BLD: 349 K/UL (ref 135–450)
PMV BLD AUTO: 8.2 FL (ref 5–10.5)
POTASSIUM REFLEX MAGNESIUM: 3.8 MMOL/L (ref 3.5–5.1)
PRO-BNP: 1812 PG/ML (ref 0–449)
RBC # BLD: 3.88 M/UL (ref 4–5.2)
SODIUM BLD-SCNC: 142 MMOL/L (ref 136–145)
WBC # BLD: 10.7 K/UL (ref 4–11)

## 2022-10-27 PROCEDURE — 83880 ASSAY OF NATRIURETIC PEPTIDE: CPT

## 2022-10-27 PROCEDURE — 80048 BASIC METABOLIC PNL TOTAL CA: CPT

## 2022-10-27 PROCEDURE — 86316 IMMUNOASSAY TUMOR OTHER: CPT

## 2022-10-27 PROCEDURE — 6360000002 HC RX W HCPCS: Performed by: NURSE PRACTITIONER

## 2022-10-27 PROCEDURE — 96365 THER/PROPH/DIAG IV INF INIT: CPT

## 2022-10-27 PROCEDURE — 36415 COLL VENOUS BLD VENIPUNCTURE: CPT

## 2022-10-27 PROCEDURE — 85027 COMPLETE CBC AUTOMATED: CPT

## 2022-10-27 PROCEDURE — 82378 CARCINOEMBRYONIC ANTIGEN: CPT

## 2022-10-27 PROCEDURE — G0378 HOSPITAL OBSERVATION PER HR: HCPCS

## 2022-10-27 PROCEDURE — 6370000000 HC RX 637 (ALT 250 FOR IP): Performed by: INTERNAL MEDICINE

## 2022-10-27 PROCEDURE — 2580000003 HC RX 258: Performed by: NURSE PRACTITIONER

## 2022-10-27 RX ORDER — CHLORTHALIDONE 25 MG/1
25 TABLET ORAL DAILY
Qty: 30 TABLET | Refills: 1 | Status: SHIPPED | OUTPATIENT
Start: 2022-10-28 | End: 2022-11-11 | Stop reason: ALTCHOICE

## 2022-10-27 RX ORDER — ATENOLOL 50 MG/1
50 TABLET ORAL DAILY
Qty: 30 TABLET | Refills: 3 | Status: SHIPPED | OUTPATIENT
Start: 2022-10-28 | End: 2022-11-30

## 2022-10-27 RX ADMIN — ATORVASTATIN CALCIUM 10 MG: 10 TABLET, FILM COATED ORAL at 09:43

## 2022-10-27 RX ADMIN — IRON SUCROSE 200 MG: 20 INJECTION, SOLUTION INTRAVENOUS at 12:56

## 2022-10-27 RX ADMIN — ASPIRIN 81 MG: 81 TABLET, COATED ORAL at 09:43

## 2022-10-27 RX ADMIN — ATENOLOL 50 MG: 25 TABLET ORAL at 09:42

## 2022-10-27 RX ADMIN — CHLORTHALIDONE 25 MG: 25 TABLET ORAL at 09:43

## 2022-10-27 RX ADMIN — ESCITALOPRAM OXALATE 20 MG: 10 TABLET ORAL at 09:42

## 2022-10-27 NOTE — PROGRESS NOTES
[facility] SURGERY    PATIENT NAME: Keven Alfaro     TODAY'S DATE: 10/27/2022    CHIEF COMPLAINT: None    INTERVAL HISTORY/HPI:    Patient with no complaints this AM. Reports no abdominal pain. No bloody BMs. Fatigue has improved. OBJECTIVE:  VITALS:  BP (!) 160/63   Pulse 57   Temp 98.6 °F (37 °C) (Oral)   Resp 16   Ht 5' 4\" (1.626 m)   Wt 168 lb 3.2 oz (76.3 kg)   SpO2 91%   BMI 28.87 kg/m²     INTAKE/OUTPUT:    I/O last 3 completed shifts: In: 451.7 [Blood:451.7]  Out: -   No intake/output data recorded. CONSTITUTIONAL:  awake and alert  LUNGS:  Normal effort, breathing room air, symmetric chest rise  ABDOMEN:  Soft, non-tender, non-distended    Data:  CBC:   Recent Labs     10/24/22  1910 10/24/22  2232 10/25/22  1048 10/25/22  1826 10/26/22  0552   WBC 10.2  --   --   --  10.3   HGB 6.0*   < > 7.5* 7.8* 7.5*   HCT 21.2*   < > 25.0* 26.1* 25.0*     --   --   --  340    < > = values in this interval not displayed. BMP:    Recent Labs     10/24/22  1910 10/25/22  0426 10/26/22  0552    137 139   K 4.0 3.6 3.4*    103 105   CO2 23 24 25   BUN 32* 25* 15   CREATININE 1.0 1.0 0.9   GLUCOSE 101* 162* 115*     Hepatic:   Recent Labs     10/24/22  1910   AST 19   ALT 16   BILITOT 0.7   ALKPHOS 43     Mag:      Recent Labs     10/26/22  0552   MG 1.90      Phos:   No results for input(s): PHOS in the last 72 hours. INR:   Recent Labs     10/24/22  2232   INR 1.20*       ASSESSMENT AND PLAN:  Lali Lopez is a [de-identified]year old female with anemia and a mesenteric mass. Her anemia has improved with transfusions and the patient has no outward signs of bleeding. She has improved symptomatically.      - GI consulted for endoscopic evaluation, which is planned currently for Monday at an outside facility  - No acute surgical intervention indicated at this time  - Chromogranin A and CEA ordered to begin workup of mesenteric mass while patient is here  - OK for discharge from a surgical perspective with outpatient evaluation with GI         Electronically signed by Alexus Clinton MD

## 2022-10-27 NOTE — DISCHARGE SUMMARY
Hospital Medicine Discharge Summary    Patient ID: Cullen Arana      Patient's PCP: Shayy Renteria MD    Admit Date: 10/24/2022     Discharge Date: 10/27/2022      Admitting Provider: Thien Malhotra MD     Discharge Provider: OFELIA Herrera - CNP     Discharge Diagnoses: Active Hospital Problems    Diagnosis     Anemia [D64.9]      Priority: Medium    Mixed hyperlipidemia [E78.2]     Type 2 diabetes mellitus ([address] [E11.9]     Essential hypertension [I10]        The patient was seen and examined on day of discharge and this discharge summary is in conjunction with any daily progress note from day of discharge. Hospital Course:     [de-identified] Y F with a h/o HTN, HLD, DM2, and bladder cancer. In 3/2022 she had some nausea and was seen in our ED. A segment of her small bowel looked abnormal on CT - 2.5 cm mass (carcinoid tumor?), vs collapsed bowel, vs tethering. The patient was discharged home with GI referral.  Dr. Chel Santamaria arranged for an outpatient CTE a few days later. It showed a focal area of small bowel narrowing and fat stranding in that area, but no mass. No further workup was pursued and the patient seemed to be doing OK, or at least not complaining to her family about anything. For the last few months, however, she has become more and more intolerant of exertion. Any little activity really wears her out. She denies chest pain or kae dyspnea. She decided to come to the ED to get checked out, and Hb was surprisingly 5.3. It looks like her baseline has been about 11 for the last few years, most recently in 3/2022. Very microcytic. The patient hasn't noticed any blood loss. Denies melena. FOBT negative in the ED. She says she had a normal colonoscopy in 2016. Acute on chronic microcytic anemia. SHARON,  Required 2u pRBCs on admission.   iron labs: iron 23, iron sat 6, TIBC 393, B12 224 folate 17.12 TSH 3.41, LDH  183, haptoglobin nl  Patient has not been losing weight. GI consulted for continuity and consideration of either inpatient or outpatient endoscop(ies). CT abd/pelvis:Mass in the small bowel mesentery measuring up to 3.4 x 1.7 cm. This measures slightly larger in maximum dimension compared to the prior studies. Carcinoid tumor is a possibility. Gen surg consulted. HTN. HR hovers in the mid 50's, reported as baseline. In light of her exertional intolerance, decreased her atenolol. Continue lisinopril. Started chlorthalidone. Her last BP was also high at her clinic appointment over a year ago. F/u with PCP. Recommend outpatient Echo, Bnp 1812,      HLD. Statin. Hypothyroidism. Clinically euthyroid. Continue home dose of levothyroxine. H/o DM2. Last A1c seems to be 6.4 in 2018. Metformin and glipizide are on her outpatient meds list. Repeat A1c 5.7 SSI while here. Depression. Escitalopram.    Hold metformin, resume 10/28    Follow up with PCP for post hospital evaluation, blood pressure check, repeat cbc, outpatient Echocardiogram, and repeat CXR    Follow up with GI for EGD and colonoscopy Monday and General Surgery as directed    Physical Exam Performed:     BP (!) 173/56   Pulse 63   Temp 97.9 °F (36.6 °C) (Oral)   Resp 18   Ht 5' 4\" (1.626 m)   Wt 168 lb 3.2 oz (76.3 kg)   SpO2 96%   BMI 28.87 kg/m²       General appearance:  No apparent distress, appears stated age and cooperative. HEENT:  Normal cephalic, atraumatic without obvious deformity. Pupils equal, round, and reactive to light. Extra ocular muscles intact. Conjunctivae/corneas clear. Neck: Supple, with full range of motion. No jugular venous distention. Trachea midline. Respiratory:  Normal respiratory effort. Clear to auscultation, bilaterally without Rales/Wheezes/Rhonchi. Cardiovascular:  Regular rate and rhythm with normal S1/S2 without murmurs, rubs or gallops. Abdomen: Soft, non-tender, non-distended with normal bowel sounds.   Musculoskeletal:  No clubbing, cyanosis or edema bilaterally. Full range of motion without deformity. Skin: Skin color, texture, turgor normal.  No rashes or lesions. Neurologic:  Neurovascularly intact without any focal sensory/motor deficits. Cranial nerves: II-XII intact, grossly non-focal.  Psychiatric:  Alert and oriented, thought content appropriate, normal insight  Capillary Refill: Brisk,< 3 seconds   Peripheral Pulses: +2 palpable, equal bilaterally       Labs: For convenience and continuity at follow-up the following most recent labs are provided:      CBC:    Lab Results   Component Value Date/Time    WBC 10.7 10/27/2022 06:34 AM    HGB 7.8 10/27/2022 06:34 AM    HCT 26.7 10/27/2022 06:34 AM     10/27/2022 06:34 AM       Renal:    Lab Results   Component Value Date/Time     10/27/2022 06:34 AM    K 3.8 10/27/2022 06:34 AM     10/27/2022 06:34 AM    CO2 28 10/27/2022 06:34 AM    BUN 12 10/27/2022 06:34 AM    CREATININE 0.9 10/27/2022 06:34 AM    CALCIUM 8.8 10/27/2022 06:34 AM         Significant Diagnostic Studies    Radiology:   CT ABDOMEN PELVIS W IV CONTRAST Additional Contrast? Oral   Final Result   1. Mass in the small bowel mesentery measuring up to 3.4 x 1.7 cm. This   measures slightly larger in maximum dimension compared to the prior studies. Carcinoid tumor is a possibility. Sclerosing mesenteritis is also a   consideration. 2. No acute findings elsewhere in the abdomen or pelvis. 3. Small bilateral pleural effusions. 4. Mild colonic diverticulosis. 5. Moderate hiatal hernia. XR CHEST (2 VW)   Final Result   Mild left basilar airspace opacities which could represent atelectasis or   pneumonia in the appropriate clinical setting.                 Consults:     IP CONSULT TO GI  IP CONSULT TO GENERAL SURGERY    Disposition:  Home    Condition at Discharge: Stable    Discharge Instructions/Follow-up:  See AVS    Code Status:  Prior     Activity: activity as tolerated    Diet: diabetic diet      Discharge Medications:     Discharge Medication List as of 10/27/2022  1:37 PM             Details   chlorthalidone (HYGROTON) 25 MG tablet Take 1 tablet by mouth daily, Disp-30 tablet, R-1Normal                Details   atenolol (TENORMIN) 50 MG tablet Take 1 tablet by mouth daily, Disp-30 tablet, R-3Normal                Details   ondansetron (ZOFRAN ODT) 4 MG disintegrating tablet Take 1 tablet by mouth every 8 hours as needed for Nausea, Disp-20 tablet, R-0Print      glipiZIDE (GLUCOTROL XL) 2.5 MG extended release tablet Take 2.5 mg by mouth dailyHistorical Med      Glucosamine 500 MG CAPS Take by mouthHistorical Med      escitalopram (LEXAPRO) 10 MG tablet Take 10 mg by mouth dailyHistorical Med      latanoprost (XALATAN) 0.005 % ophthalmic solution 1 drop nightlyHistorical Med      montelukast (SINGULAIR) 10 MG tablet Take 10 mg by mouth nightly      levothyroxine (SYNTHROID) 75 MCG tablet Take 100 mcg by mouth Daily Historical Med      simvastatin (ZOCOR) 10 MG tablet Take 10 mg by mouth nightly      lisinopril (PRINIVIL;ZESTRIL) 40 MG tablet Take 40 mg by mouth nightly      metFORMIN (GLUCOPHAGE) 1000 MG tablet Take 1,000 mg by mouth 3 times daily Historical Med      Calcium Carbonate (CALTRATE 600 PO) Take by mouth 2 times daily             Time Spent on discharge is more than 30 minutes in the examination, evaluation, counseling and review of medications and discharge plan. Signed:    OFELIA Arias CNP   11/1/2022      Thank you Adrianne Escoto MD for the opportunity to be involved in this patient's care. If you have any questions or concerns, please feel free to contact me at [phone].

## 2022-10-27 NOTE — PROGRESS NOTES
PROGRESS NOTE    HPI: Nicholas Diggs is a(n)82 y.o. female admitted for work-up and treatment for Dyspnea on exertion [R06.09]  Anemia [D64.9]  Signs and symptoms of anemia [R68.89]. We are following for SHARON. Subjective:     No complaints. No acute events overnight. Objective:     No intake/output data recorded. BP (!) 173/56   Pulse 63   Temp 97.9 °F (36.6 °C) (Oral)   Resp 18   Ht 5' 4\" (1.626 m)   Wt 168 lb 3.2 oz (76.3 kg)   SpO2 96%   BMI 28.87 kg/m²     Physical Exam:  HEENT: anicteric sclera, oropharyngeal membranes pink and moist.  Cor: RRR  Lungs: non-labored, no respiratory distress  Abdomen: soft, NT. No ascites. No hepatomegaly or splenomegaly  Extremities: no edema      Results:   Lab Results   Component Value Date    ALT 16 10/24/2022    AST 19 10/24/2022    ALKPHOS 43 10/24/2022    PROT 6.4 10/24/2022    LABALBU 4.2 10/24/2022    INR 1.20 (H) 10/24/2022    LIPASE 71.0 (H) 03/06/2022     Lab Results   Component Value Date    WBC 10.7 10/27/2022    HGB 7.8 (L) 10/27/2022    HCT 26.7 (L) 10/27/2022    MCV 68.7 (L) 10/27/2022     10/27/2022     BUN/Cr/glu/ALT/AST/amyl/lip:  12/0.9/--/--/--/--/-- (10/27 6859)  [unfilled]      Impression:  1. Profound Iron deficiency anemia without overt signs of GI bleeding. No recent EGDs. Last colonoscopy 2016. S/p blood transfusions and has received 3 venofer transfusions as of today. Hgb remains stable. 2. Abnormal CT imaging - mass within the small bowel mesentery. Plan:  EGD and colonoscopy Monday. Appreciate surgery input. Okay to discharge from GI standpoint. Please do not hesitate to call with questions or concerns.       Electronically signed by: OFELIA Garcia 10/27/2022 4:29 PM

## 2022-10-27 NOTE — DISCHARGE INSTRUCTIONS
Hold metformin, resume 10/28    Follow up with PCP for post hospital evaluation, blood pressure check and repeat cbc    Follow up with GI and General Surgery as directed No deformities present

## 2022-10-27 NOTE — CARE COORDINATION
CASE MANAGEMENT DISCHARGE SUMMARY      Discharge to: home         IMM given: (date) 10/27/22         Transportation:    Family/car:        Confirmed discharge plan with:     Patient: yes      Family:  yes-daughter and son at bedside        RN, name: [facility]    Note: Discharging nurse to complete CHRISTOPHER, reconcile AVS, and place final copy with patient's discharge packet. RN to ensure that written prescriptions for  Level II medications are sent with patient to the facility as per protocol.

## 2022-10-30 LAB — CHROMOGRANIN A: 62 NG/ML (ref 0–103)

## 2022-11-01 NOTE — DISCHARGE INSTR - COC
Continuity of Care Form    Patient Name: Ghada Barron   :  1940  MRN:  [id number]    Admit date:  10/24/2022  Discharge date:  ***    Code Status Order: Prior   Advance Directives:     Admitting Physician:  Carlos Alarcon MD  PCP: Zaida Doty MD    Discharging Nurse: [facility] Unit/Room#: 5359/3878-18  Discharging Unit Phone Number: ***    Emergency Contact:   Extended Emergency Contact Information  Primary Emergency Contact: [place] Phone: [phone]  Mobile Phone: [phone]  Relation: Child  Secondary Emergency Contact: nikki amado Phone: [phone]  Mobile Phone: [phone]  Relation: Child    Past Surgical History:  Past Surgical History:   Procedure Laterality Date    COLONOSCOPY      normal per pt    COLONOSCOPY  2016    normal    HYSTERECTOMY      OOPHORECTOMY         Immunization History: There is no immunization history for the selected administration types on file for this patient.     Active Problems:  Patient Active Problem List   Diagnosis Code    Abnormal EKG R94.31    Essential hypertension I10    Mixed hyperlipidemia E78.2    Type 2 diabetes mellitus ([facility] [address] E11.9    Left ankle swelling, not cardiac in nature M25.472    Anemia D64.9       Isolation/Infection:   Isolation            No Isolation          Patient Infection Status       None to display            Nurse Assessment:  Last Vital Signs: BP (!) 173/56   Pulse 63   Temp 97.9 °F (36.6 °C) (Oral)   Resp 18   Ht 5' 4\" (1.626 m)   Wt 168 lb 3.2 oz (76.3 kg)   SpO2 96%   BMI 28.87 kg/m²     Last documented pain score (0-10 scale): Pain Level: 2  Last Weight:   Wt Readings from Last 1 Encounters:   10/24/22 168 lb 3.2 oz (76.3 kg)     Mental Status:  {IP PT MENTAL STATUS:99324}    IV Access:  { CHRISTOPHER IV ACCESS:845896016}    Nursing Mobility/ADLs:  Walking   {CHP DME WIRB:597239764}  Transfer  {CHP DME MOLINA:014880084}  Bathing  {CHP DME PVMO:253142240}  Dressing  {CHP DME FUEC:133191143}  Toileting  {CHP DME JETO:048239879}  Feeding  {CHP DME QQRV:285785858}  Med Admin  {CHP DME RLDN:348342836}  Med Delivery   { CHRISTOPHER MED Delivery:655739187}    Wound Care Documentation and Therapy:        Elimination:  Continence: Bowel: {YES / MI:42758}  Bladder: {YES / JA:35613}  Urinary Catheter: {Urinary Catheter:622816639}   Colostomy/Ileostomy/Ileal Conduit: {YES / ZE:37664}       Date of Last BM: ***  No intake or output data in the 24 hours ending 22 1421  No intake/output data recorded.     Safety Concerns:     [address] Safety Concerns:689676095}    Impairments/Disabilities:      [address] Impairments/Disabilities:002835807}    Nutrition Therapy:  Current Nutrition Therapy:   [address] Diet List:816652272}    Routes of Feeding: {[facility] DME Other Feedings:709771485}  Liquids: {Slp liquid thickness:79616}  Daily Fluid Restriction: {CHP DME Yes amt example:266355518}  Last Modified Barium Swallow with Video (Video Swallowing Test): {Done Not Done WKGR:572961948}    Treatments at the Time of Hospital Discharge:   Respiratory Treatments: ***  Oxygen Therapy:  {Therapy; copd oxygen:07829}  Ventilator:    { CC Vent YULW:052617409}    Rehab Therapies: {THERAPEUTIC INTERVENTION:1281352314}  Weight Bearing Status/Restrictions: 508 2sms  Weight Bearin}  Other Medical Equipment (for information only, NOT a DME order):  {EQUIPMENT:689691943}  Other Treatments: ***    Patient's personal belongings (please select all that are sent with patient):  {[facility] DME Belongings:149566935}    RN SIGNATURE:  {Esignature:702869814}    CASE MANAGEMENT/SOCIAL WORK SECTION    Inpatient Status Date: ***    Readmission Risk Assessment Score:  Readmission Risk              Risk of Unplanned Readmission:  0           Discharging to Facility/ Agency   Name:   Address:  Phone:  Fax:    Dialysis Facility (if applicable)   Name:  Address:  Dialysis Schedule:  Phone:  Fax:    / signature: {Esignature:398670786}    PHYSICIAN SECTION    Prognosis: {Prognosis:8471518742}    Condition at Discharge: [address] Patient Condition:637056753}    Rehab Potential (if transferring to Rehab): {Prognosis:9502933347}    Recommended Labs or Other Treatments After Discharge: ***    Physician Certification: I certify the above information and transfer of Brenden Martin  is necessary for the continuing treatment of the diagnosis listed and that she requires {Admit to Appropriate Level of Care:24086} for {GREATER/LESS:927406075} 30 days.      Update Admission H&P: {CHP DME Changes in IZVDJ:629142271}    PHYSICIAN SIGNATURE:  {Esignature:145571148}

## 2022-11-04 ENCOUNTER — INITIAL CONSULT (OUTPATIENT)
Dept: SURGERY | Age: 82
End: 2022-11-04
Payer: MEDICARE

## 2022-11-04 ENCOUNTER — HOSPITAL ENCOUNTER (OUTPATIENT)
Age: 82
Discharge: HOME | End: 2022-11-04
Payer: MEDICARE

## 2022-11-04 VITALS
HEIGHT: 64 IN | SYSTOLIC BLOOD PRESSURE: 148 MMHG | BODY MASS INDEX: 27.28 KG/M2 | DIASTOLIC BLOOD PRESSURE: 68 MMHG | HEART RATE: 63 BPM | WEIGHT: 159.8 LBS

## 2022-11-04 DIAGNOSIS — K63.89 MESENTERIC MASS: ICD-10-CM

## 2022-11-04 DIAGNOSIS — D50.9 IRON DEFICIENCY ANEMIA, UNSPECIFIED IRON DEFICIENCY ANEMIA TYPE: Primary | ICD-10-CM

## 2022-11-04 LAB
BASOPHILS ABSOLUTE: 0.1 K/UL (ref 0–0.2)
BASOPHILS RELATIVE PERCENT: 0.8 %
EOSINOPHILS ABSOLUTE: 0.3 K/UL (ref 0–0.6)
EOSINOPHILS RELATIVE PERCENT: 2.5 %
HCT VFR BLD CALC: 36.9 % (ref 36–48)
HEMOGLOBIN: 10.7 G/DL (ref 12–16)
LYMPHOCYTES ABSOLUTE: 1.1 K/UL (ref 1–5.1)
LYMPHOCYTES RELATIVE PERCENT: 11.2 %
MCH RBC QN AUTO: 22.2 PG (ref 26–34)
MCHC RBC AUTO-ENTMCNC: 29 G/DL (ref 31–36)
MCV RBC AUTO: 76.5 FL (ref 80–100)
MONOCYTES ABSOLUTE: 0.9 K/UL (ref 0–1.3)
MONOCYTES RELATIVE PERCENT: 8.9 %
NEUTROPHILS ABSOLUTE: 7.5 K/UL (ref 1.7–7.7)
NEUTROPHILS RELATIVE PERCENT: 76.6 %
PDW BLD-RTO: 34.8 % (ref 12.4–15.4)
PLATELET # BLD: 319 K/UL (ref 135–450)
PLATELET SLIDE REVIEW: ADEQUATE
PMV BLD AUTO: 8.7 FL (ref 5–10.5)
RBC # BLD: 4.82 M/UL (ref 4–5.2)
SLIDE REVIEW: ABNORMAL
WBC # BLD: 9.9 K/UL (ref 4–11)

## 2022-11-04 PROCEDURE — 1123F ACP DISCUSS/DSCN MKR DOCD: CPT | Performed by: SURGERY

## 2022-11-04 PROCEDURE — 36415 COLL VENOUS BLD VENIPUNCTURE: CPT

## 2022-11-04 PROCEDURE — 3074F SYST BP LT 130 MM HG: CPT | Performed by: SURGERY

## 2022-11-04 PROCEDURE — 99214 OFFICE O/P EST MOD 30 MIN: CPT | Performed by: SURGERY

## 2022-11-04 PROCEDURE — 85025 COMPLETE CBC W/AUTO DIFF WBC: CPT

## 2022-11-04 PROCEDURE — 3078F DIAST BP <80 MM HG: CPT | Performed by: SURGERY

## 2022-11-04 RX ORDER — SODIUM CHLORIDE 9 MG/ML
INJECTION, SOLUTION INTRAVENOUS PRN
OUTPATIENT
Start: 2022-11-04

## 2022-11-04 RX ORDER — SODIUM CHLORIDE 0.9 % (FLUSH) 0.9 %
5-40 SYRINGE (ML) INJECTION EVERY 12 HOURS SCHEDULED
OUTPATIENT
Start: 2022-11-04

## 2022-11-04 RX ORDER — SODIUM CHLORIDE 0.9 % (FLUSH) 0.9 %
5-40 SYRINGE (ML) INJECTION PRN
OUTPATIENT
Start: 2022-11-04

## 2022-11-04 NOTE — PROGRESS NOTES
[address] [facility], [address] [facility], [address], [address]  Phone: [phone]  Fax: David Mcgee 14   YOB: 1940    Date of Visit:  11/4/2022    Tannersonivone Mitcheller s [address] [facility] [place]    HPI: [de-identified] y/o WF seen in hospital last week during a workup for recurrent, persistent anemia. During workup, CT showed an irregular \"mass' in the upper abdomen. This had originally been seen on another CT ~6 months ago. Latest CT showed the mass had increased slightly in size. This week pt had colonoscopy/EGD with few benign colon polyps seen, and nothing in stomach/duodenum. Pt continues to deny abdominal pain, bloating, nausea, vomiting.       Allergies   Allergen Reactions    Z-Alvarado [Azithromycin] Hives     Outpatient Medications Marked as Taking for the 11/4/22 encounter (Initial consult) with Leon Pace MD   Medication Sig Dispense Refill    chlorthalidone (HYGROTON) 25 MG tablet Take 1 tablet by mouth daily 30 tablet 1    atenolol (TENORMIN) 50 MG tablet Take 1 tablet by mouth daily 30 tablet 3    ondansetron (ZOFRAN ODT) 4 MG disintegrating tablet Take 1 tablet by mouth every 8 hours as needed for Nausea 20 tablet 0    glipiZIDE (GLUCOTROL XL) 2.5 MG extended release tablet Take 2.5 mg by mouth daily      Glucosamine 500 MG CAPS Take by mouth      escitalopram (LEXAPRO) 10 MG tablet Take 10 mg by mouth daily      latanoprost (XALATAN) 0.005 % ophthalmic solution 1 drop nightly      montelukast (SINGULAIR) 10 MG tablet Take 10 mg by mouth nightly      levothyroxine (SYNTHROID) 75 MCG tablet Take 100 mcg by mouth Daily       simvastatin (ZOCOR) 10 MG tablet Take 10 mg by mouth nightly      lisinopril (PRINIVIL;ZESTRIL) 40 MG tablet Take 40 mg by mouth nightly      metFORMIN (GLUCOPHAGE) 1000 MG tablet Take 1,000 mg by mouth 3 times daily       Calcium Carbonate (CALTRATE 600 PO) Take by mouth 2 times daily         Past Medical oz (76.3 kg)   08/18/21 169 lb (76.7 kg)     BP Readings from Last 3 Encounters:   11/04/22 (!) 148/68   10/27/22 (!) 173/56   03/06/22 (!) 136/53          REVIEW OF SYSTEMS:   All other systems reviewed; please refer to HPI with pertinent positives, all other ROS are negative    PHYSICAL EXAM:    CONSTITUTIONAL:  awake, alert, no apparent distress and normal weight  ENT:  normocepalic, without obvious abnormality  NECK:  supple, symmetrical, trachea midline   LUNGS:     CARDIOVASCULAR:    and    ABDOMEN:  soft, non-distended  MUSCULOSKELETAL:  0+ pitting edema lower extremities  NEUROLOGIC:  Mental Status Exam:  Level of Alertness:   awake  Orientation:   person, place, time      DATA:  CT abd/pelvis (images reviewed by myself)  1. Mass in the small bowel mesentery measuring up to 3.4 x 1.7 cm. This   measures slightly larger in maximum dimension compared to the prior studies. Carcinoid tumor is a possibility. Sclerosing mesenteritis is also a   consideration. 2. No acute findings elsewhere in the abdomen or pelvis. 3. Small bilateral pleural effusions. 4. Mild colonic diverticulosis. 5. Moderate hiatal hernia. Assessment:  1. Iron deficiency anemia, unspecified iron deficiency anemia type    2. Mesenteric mass      We reviewed the issues of the mass and the possible this could be a malignancy (ie carcinoid). During hospital stay, CEA and Chromogranin A were obtained, both normal.  Currently she is feeling more fatigued again which has been her presentation for anemia previously. Plan: Will check CBC today to r/o recurrent anemia. We will plan for surgical exploration to identify the mesenteric lesion and likely resect it. Explained to her that resecting the lesion will likely require an associated small bowel resection.   Technical aspects, risks and complications of surgery were reviewed in detail, including bleeding, infection, bowel leak, need to convert to open, need for further surgery. Patient and family appear to understand, ask appropriate questions, and agree to proceed.

## 2022-11-04 NOTE — PATIENT INSTRUCTIONS
[address] [address]  Phone: [phone]  Fax: [phone] will be scheduled for surgery with Dr. Nikki Gupta. The office will call you with the date and time that surgery is scheduled. Please take note of these instructions for surgery: You should have nothing by mouth after midnight the night before your surgery - this includes no food or water. Your surgery will be cancelled if you have taken anything by mouth after midnight, NO exceptions. You will need to have a history and physical prior to your surgery. This will need to be completed up to 30 days before your surgery. This H/P can be completed by your family doctor or the hospital.   IF you take coumadin (warfarin), please stop taking this medication 5 days prior to your surgery. IF you take plavix, please stop taking this 7 days prior to your surgery. Please contact our office if you have a pacemaker or defibrillator. IF you are allergic to latex, please tell our office prior to your surgery. This is important in know before scheduling your surgery. IF you are having an out patient surgery, you will need someone available to drive you home after your surgery, and to also stay with you for the rest of the day. IF you are having a surgery requiring an inpatient stay in the hospital, you will need someone to drive you home upon discharge from the hospital.  Please contact Dr. Gena Peralta assistant Wyvonna Siemens if you have any questions or concerns. Please call the office with any changes in your symptoms or further questions/concerns.  [phone]

## 2022-11-07 ENCOUNTER — TELEPHONE (OUTPATIENT)
Dept: SURGERY | Age: 82
End: 2022-11-07

## 2022-11-07 NOTE — TELEPHONE ENCOUNTER
Pt saw Dr La Kyle in the office 11/4/22 and was given surgery instructions for a Robotic Small Bowel Resection, Possible Open Procedure (General Anes) scheduled 12/7/22 @ 9:45am arrival 7:45am [facility] - Pt will be same-day-admit - NPO after midnight - Dr Sofia Carlin to complete pre-op physical - Pt understood and agreed w/ above noted

## 2022-11-10 ENCOUNTER — TELEPHONE (OUTPATIENT)
Dept: CARDIOLOGY CLINIC | Age: 82
End: 2022-11-10

## 2022-11-10 NOTE — TELEPHONE ENCOUNTER
VSP there is an EKG from 10/24/22 in [facility]. Or Would you like to add on to clinic tomorrow? Please advise.

## 2022-11-10 NOTE — TELEPHONE ENCOUNTER
Pts daughter Joo Olmedo called to get an ov with vsp for cardiac clearance. Pt is going to have a bowel surgery with Dr. Venecia Shannon on 12/07. Pt saw her pcp and they advised pt to get an echo and follow up with vsp for clearance. Vsp does not have anything available after the echo and prior to the surgery? Is there an overbook date and time and/or can pt schedule with an np? Pt has last seen vsp in August 2021.

## 2022-11-10 NOTE — TELEPHONE ENCOUNTER
Spoke with Epifanio Cortez, offered VSP appt for tomorrow. Pt declined appt. She states she had another appt that was too important to cancel or reschedule. I informed pt that is the only appt prior to her procedure and she can not have clearance with out being seen. Pt states she will contact Dr. Dayton Cadet office and she will have him get her in.

## 2022-11-10 NOTE — TELEPHONE ENCOUNTER
11/10 pts daughter called back requesting returned phone call from Ushahidi.  Read pts daughter [facility] response and pt stated she is unable to make available appt w/ VSP tomorrow due to another appt and was requesting another date due to pt needing cardiac clx, advised pts daughter VSP has no other availability at this time, pts daughter stated she would find another cardiologist very loudly and hung up on me

## 2022-11-11 ENCOUNTER — OFFICE VISIT (OUTPATIENT)
Dept: CARDIOLOGY CLINIC | Age: 82
End: 2022-11-11
Payer: MEDICARE

## 2022-11-11 VITALS
SYSTOLIC BLOOD PRESSURE: 160 MMHG | DIASTOLIC BLOOD PRESSURE: 80 MMHG | HEIGHT: 60 IN | WEIGHT: 160 LBS | OXYGEN SATURATION: 98 % | BODY MASS INDEX: 31.41 KG/M2 | HEART RATE: 71 BPM

## 2022-11-11 DIAGNOSIS — Z01.818 PREOPERATIVE CLEARANCE: ICD-10-CM

## 2022-11-11 DIAGNOSIS — I10 ESSENTIAL HYPERTENSION: ICD-10-CM

## 2022-11-11 DIAGNOSIS — Z01.818 PREOPERATIVE CLEARANCE: Primary | ICD-10-CM

## 2022-11-11 DIAGNOSIS — E78.2 MIXED HYPERLIPIDEMIA: ICD-10-CM

## 2022-11-11 DIAGNOSIS — K63.89 MESENTERIC MASS: ICD-10-CM

## 2022-11-11 PROCEDURE — 93000 ELECTROCARDIOGRAM COMPLETE: CPT | Performed by: INTERNAL MEDICINE

## 2022-11-11 PROCEDURE — 1123F ACP DISCUSS/DSCN MKR DOCD: CPT | Performed by: INTERNAL MEDICINE

## 2022-11-11 PROCEDURE — 3078F DIAST BP <80 MM HG: CPT | Performed by: INTERNAL MEDICINE

## 2022-11-11 PROCEDURE — 99214 OFFICE O/P EST MOD 30 MIN: CPT | Performed by: INTERNAL MEDICINE

## 2022-11-11 PROCEDURE — 3074F SYST BP LT 130 MM HG: CPT | Performed by: INTERNAL MEDICINE

## 2022-11-11 RX ORDER — PANTOPRAZOLE SODIUM 40 MG/1
40 GRANULE, DELAYED RELEASE ORAL
COMMUNITY

## 2022-11-11 RX ORDER — ATENOLOL 50 MG/1
75 TABLET ORAL DAILY
Qty: 90 TABLET | Refills: 5 | Status: SHIPPED | OUTPATIENT
Start: 2022-11-11

## 2022-11-11 RX ORDER — HYDROCHLOROTHIAZIDE 25 MG/1
25 TABLET ORAL EVERY MORNING
Qty: 90 TABLET | Refills: 3 | Status: SHIPPED | OUTPATIENT
Start: 2022-11-11

## 2022-11-11 NOTE — LETTER
[address] [place] [address] Cardiology - [address]  Phone: [phone]  Fax: [phone]    Ritu Rivera MD    November 14, 2022     [address] [id number]    Patient: Hans Mukherjee   MR Number: [id number]   YOB: 1940   Date of Visit: 11/11/2022       Dear Mariah BARAKAT:    Thank you for referring Fahrat De to me for evaluation/treatment. Below are the relevant portions of my assessment and plan of care. If you have questions, please do not hesitate to call me. I look forward to following Yasmeen Bliss along with you.     Sincerely,      Ritu Rivera MD

## 2022-11-11 NOTE — PROGRESS NOTES
[address] [facility]   Cardiovascular Evaluation    PATIENT: Javier Hutchinson  DATE: 2022  MRN: [id number]  CSN: [id number]  : 1940    Primary Care Doctor/Referring provider: Namon Hatchet, MD    Reason for evaluation/Chief complaint:   Cardiac Clearance (Pt is going to have a GI procedure to remove a section of bowl/Pt reports that she was seen in the ED recently due to low hemoglobin), Hypertension, and Hyperlipidemia      Subjective:    History of present illness on initial date of evaluation:   Javier Hutchinson is a [de-identified] y.o. patient who presents for follow up with a medical history including hypertension and hyperlipidemia. She presented to the emergency room on 20 with left leg swelling. Her left ankle was noted to be swollen at that time. An xray was done and showed soft tissue swelling suggestive of venous stasis. She was discharged home on Eliquis. Venous Doppler completed negative for blood clot, Eliquis was discontinued. Today, patient presents for cardiac clearance for bowel surgery with Dr novak on 22. Patient is taking all cardiac medications as prescribed and tolerates them well. Patient denies current edema, chest pain, sob, palpitations, dizziness or syncope. Patient Active Problem List   Diagnosis    Abnormal EKG    Essential hypertension    Mixed hyperlipidemia    Type 2 diabetes mellitus (HCC)    Left ankle swelling, not cardiac in nature    Anemia    Mesenteric mass    Preoperative clearance         Cardiac Testing: I have reviewed the findings below. EKG: 10/24/2022  Sinus rhythm with marked sinus arrhythmia  Nonspecific ST abnormality  Abnormal ECG    ECHO: 3/15/2019  Summary   Normal left ventricular systolic function with ejection fraction of 55-60%. No regional wall motion abnormalites are seen. Grade I diastolic dysfunction with normal filling pressure. Mild mitral and aortic regurgitation.    The left atrium is mildly dilated. STRESS TEST:  CATH:  BYPASS:  VASCULAR:    Past Medical History:   has a past medical history of Cancer ([facility] [address] Diabetes mellitus ([facility] [address] Hyperlipidemia, Hypertension, Seasonal allergies, and Thyroid disease. Surgical History:   has a past surgical history that includes Hysterectomy; Colonoscopy (2002); Colonoscopy (2016); and Ovary removal.     Social History:   reports that she has never smoked. She has never used smokeless tobacco. She reports that she does not drink alcohol and does not use drugs. Family History:  No evidence for sudden cardiac death or premature CAD    Medications:  Reviewed and are listed in nursing record. and/or listed below  Outpatient Medications:  Prior to Admission medications    Medication Sig Start Date End Date Taking?  Authorizing Provider   pantoprazole sodium (PROTONIX) 40 MG PACK packet Take 40 mg by mouth every morning (before breakfast)   Yes Historical Provider, MD   atenolol (TENORMIN) 50 MG tablet Take 1.5 tablets by mouth daily 11/11/22  Yes Jonathan Hatchet, MD   hydroCHLOROthiazide (HYDRODIURIL) 25 MG tablet Take 1 tablet by mouth every morning 11/11/22  Yes Jonathan Hatchet, MD   atenolol (TENORMIN) 50 MG tablet Take 1 tablet by mouth daily 10/28/22  Yes OFELIA Ochoa - CNP   glipiZIDE (GLUCOTROL XL) 2.5 MG extended release tablet Take 2.5 mg by mouth daily   Yes Historical Provider, MD   Glucosamine 500 MG CAPS Take by mouth   Yes Historical Provider, MD   escitalopram (LEXAPRO) 10 MG tablet Take 10 mg by mouth daily   Yes Historical Provider, MD   latanoprost (XALATAN) 0.005 % ophthalmic solution 1 drop nightly   Yes Historical Provider, MD   montelukast (SINGULAIR) 10 MG tablet Take 10 mg by mouth nightly   Yes Historical Provider, MD   levothyroxine (SYNTHROID) 75 MCG tablet Take 100 mcg by mouth Daily    Yes Historical Provider, MD   simvastatin (ZOCOR) 10 MG tablet Take 10 mg by mouth nightly   Yes Historical Provider, MD   lisinopril (PRINIVIL;ZESTRIL) 40 MG tablet Take 40 mg by mouth nightly   Yes Historical Provider, MD   metFORMIN (GLUCOPHAGE) 1000 MG tablet Take 1,000 mg by mouth 3 times daily    Yes Historical Provider, MD   ondansetron (ZOFRAN ODT) 4 MG disintegrating tablet Take 1 tablet by mouth every 8 hours as needed for Nausea  Patient not taking: Reported on 11/11/2022 3/6/22   OFELIA Villegas - CNP   Calcium Carbonate (CALTRATE 600 PO) Take by mouth 2 times daily  Patient not taking: Reported on 11/11/2022    Historical Provider, MD       In-patient schedule medications:        Infusion Medications: Allergies:  Z-ani [azithromycin]     Review of Systems:   All 14 point review of symptoms completed. Pertinent positives identified in the HPI, all other review of symptoms findings as below.      Review of Systems - History obtained from the patient  General ROS: negative for - chills, fever or night sweats  Psychological ROS: negative for - disorientation or hallucinations  Ophthalmic ROS: negative for - dry eyes, eye pain or loss of vision  ENT ROS: negative for - nasal discharge or sore throat  Allergy and Immunology ROS: negative for - hives or itchy/watery eyes  Hematological and Lymphatic ROS: negative for - jaundice or night sweats  Endocrine ROS: negative for - mood swings or temperature intolerance  Breast ROS: deferred  Respiratory ROS: negative for - hemoptysis or stridor   Cardiovascular ROS: negative for - chest pain, dyspnea on exertion or palpitations  Gastrointestinal ROS: no abdominal pain, change in bowel habits, or black or bloody stools  Genito-Urinary ROS: no dysuria, trouble voiding, or hematuria  Musculoskeletal ROS: negative for - gait disturbance, joint pain or joint stiffness  Neurological ROS: negative for - seizures or speech problems  Dermatological ROS: negative for - rash or skin lesion changes      Physical Examination:    BP (!) 160/80   Pulse 71   Ht 5' (1.524 m)   Wt 160 lb (72.6 kg) SpO2 98%   BMI 31.25 kg/m²   BP (!) 160/80   Pulse 71   Ht 5' (1.524 m)   Wt 160 lb (72.6 kg)   SpO2 98%   BMI 31.25 kg/m²    Weight: 160 lb (72.6 kg)     Wt Readings from Last 3 Encounters:   11/11/22 160 lb (72.6 kg)   11/04/22 159 lb 12.8 oz (72.5 kg)   10/24/22 168 lb 3.2 oz (76.3 kg)     No intake or output data in the 24 hours ending 11/11/22 1536    General Appearance:  Alert, cooperative, no distress, appears stated age   Head:  Normocephalic, without obvious abnormality, atraumatic   Eyes:  PERRL, conjunctiva/corneas clear       Nose: Nares normal, no drainage or sinus tenderness   Throat: Lips, mucosa, and tongue normal   Neck: Supple, symmetrical, trachea midline, no adenopathy, thyroid: not enlarged, symmetric, no tenderness/mass/nodules, no carotid bruit or JVD       Lungs:   Clear to auscultation bilaterally, respirations unlabored   Chest Wall:  No tenderness or deformity   Heart:  Regular rhythm and normal rate; S1, S2 are normal; no murmur noted; no rub or gallop   Abdomen:   Soft, non-tender, bowel sounds active all four quadrants,  no masses, no organomegaly           Extremities: Extremities normal, atraumatic, no cyanosis, Trace left ankle edema    Pulses: 2+ and symmetric   Skin: Skin color, texture, turgor normal, no rashes or lesions   Pysch: Normal mood and affect   Neurologic: Normal gross motor and sensory exam.         Labs      Imaging:  I have reviewed the below testing personally and my interpretation is below. EKG:  CXR:      Assessment:  [de-identified] y.o. patient with:  Problem List Items Addressed This Visit       Mesenteric mass    Preoperative clearance - Primary    Relevant Orders    EKG 12 lead (Completed)    Essential hypertension    Mixed hyperlipidemia    Relevant Medications    atenolol (TENORMIN) 50 MG tablet    hydroCHLOROthiazide (HYDRODIURIL) 25 MG tablet         Plan:  EKG today - Sinus  Rhythm -Left axis -anterior fascicular block.   The patient's cardiac condition is not prohibitory to undergo surgery. The patient's cardiac risk is moderate based upon my evaluation and testing. Stable to proceed. Current medications reviewed. - restart Atenolol 75 mg daily   - restart Hydrochlorothiazide 25 mg daily    - stop chlorthalidone 25mg    Follow up with me in 1 year        Scribe's attestation: This note was scribed in the presence of Carmen White by Shaun Ty RN        I, Dr. Vicky Dickerson, personally performed the services described in this documentation, as scribed by the above signed scribe in my presence. It is both accurate and complete to my knowledge. I agree with the details independently gathered by the clinical support staff, while the remaining scribed note accurately describes my personal service to the patient. Medical Decision Making: The following items were considered in medical decision making:  Independent review of images  Review / order clinical lab tests  Review / order radiology tests  Decision to obtain old records  Review and summation of old records as accessed through [facility] (a summary of my findings in these old records)      Time Based Itemization  A total of 30 minutes was spent on today's patient encounter. If applicable, non-patient-facing activities:  (X)Preparing to see the patient and reviewing records  (X) Individual interpretation of results  ( ) Discussion or coordination of care with other health care professionals  () Ordering of unique tests, medications, or procedures  (X) Documentation within the EHR       All questions and concerns were addressed to the patient/family. Alternatives to my treatment were discussed. The note was completed using EMR. Every effort was made to ensure accuracy; however, inadvertent computerized transcription errors may be present.     Vicky Dickerson MD, [address], [facility]  [phone] [place] office  [phone] [facility]  11/11/2022  3:36 PM

## 2022-11-11 NOTE — PATIENT INSTRUCTIONS
Plan:  EKG today - Sinus  Rhythm -Left axis -anterior fascicular block. The patient's cardiac condition is not prohibitory to undergo surgery. The patient's cardiac risk is moderate based upon my evaluation and testing. Stable to proceed. Current medications reviewed.     - restart Atenolol 75 mg daily   - restart Hydrochlorothiazide 25 mg daily    - stop chlorthalidone 25mg    Follow up with me in 1 year

## 2022-11-11 NOTE — TELEPHONE ENCOUNTER
11/11Angy from Dr. Castro Malena office called requesting cardiac clx letter be faxed to them after appt today w/ SARAH @ [phone], please advise

## 2022-11-23 ENCOUNTER — HOSPITAL ENCOUNTER (OUTPATIENT)
Age: 82
Discharge: HOME | End: 2022-11-23
Payer: MEDICARE

## 2022-11-23 ENCOUNTER — HOSPITAL ENCOUNTER (OUTPATIENT)
Dept: GENERAL RADIOLOGY | Age: 82
Discharge: HOME | End: 2022-11-23
Payer: MEDICARE

## 2022-11-23 ENCOUNTER — HOSPITAL ENCOUNTER (OUTPATIENT)
Dept: CARDIOLOGY | Age: 82
Discharge: HOME | End: 2022-11-23
Payer: MEDICARE

## 2022-11-23 DIAGNOSIS — R06.00 DYSPNEA, UNSPECIFIED TYPE: ICD-10-CM

## 2022-11-23 DIAGNOSIS — J90 EXUDATIVE PLEURISY: ICD-10-CM

## 2022-11-23 PROCEDURE — 93306 TTE W/DOPPLER COMPLETE: CPT

## 2022-11-23 PROCEDURE — 71046 X-RAY EXAM CHEST 2 VIEWS: CPT

## 2022-11-30 RX ORDER — LATANOPROSTENE BUNOD 0.24 MG/ML
1 SOLUTION/ DROPS OPHTHALMIC NIGHTLY
COMMUNITY

## 2022-11-30 RX ORDER — ASPIRIN 81 MG/1
81 TABLET ORAL DAILY
COMMUNITY

## 2022-11-30 NOTE — PROGRESS NOTES
1. Do not eat or drink anything after 12 midnight prior to surgery. This includes no water, chewing gum mints, or ice chips. You may brush your teeth and gargle the day of surgery but DO NOT SWALLOW THE WATER. 2. Please see your family doctor/pediatrician for a history and physical and/or concerning medications. Bring any test results/reports from your physician's office. If you are under the care of a heart doctor or specialist please be aware that you may be asked to see him or her for clearance. 3. You may be asked to stop blood thinners such as Coumadin, Plavix, Fragmin, and Lovenox or Anti-inflammatories such as Aspirin, Ibuprofen, Advil, and Naproxen prior to your surgery. Please check with your doctor before stopping these or any other medications. 4. Do not smoke, and do not drink any alcoholic beverages 24 hours prior to surgery. 5. You MUST make arrangements for a responsible adult to take you home after your surgery. For your safety, you will not be allowed to leave alone or drive yourself home. Your surgery will be cancelled if you do not have a ride home. Also for your safety, it is strongly suggested someone stay with you the first 24 hrs after your surgery. 6. A parent/legal guardian must accompany a child scheduled for surgery and plan to stay at the hospital until the child is discharged. Please do not bring other children with you. 7. For your comfort,please wear simple, loose fitting clothing to the hospital.  Please do not bring valuables (money, credit cards, checkbooks, etc.) Do not wear any makeup (including no eye makeup) or nail polish on your fingers or toes. 8. For your safety, please DO NOT wear any jewelry or piercings on day of surgery. All body piercing jewelry must be removed. 9. If you have dentures, they will be removed before going to the OR; for your convenience we will provide you with a container.   If you wear contact lenses or glasses, they will be removed, they will be removed, please bring a case for them. 10. If appicable,Please see your family doctor/pediatrician for a history & physical and/or concerning medications. Bring any test results/reports from your physician's office. 11. Remember to bring Blood Bank bracelet to the hospital on the day of surgery. 12. If you have a Living Will and Durable Power of  for Healthcare, please bring in a copy. 15. Notify your Surgeon if you develop any illness between now and surgery  time, cough, cold, fever, sore throat, nausea, vomiting, etc.  Please notify your surgeon if you experience dizziness, shortness of breath or blurred vision between now & the time of your surgery   14. DO NOT shave your operative site 96 hours prior to surgery. For face & neck surgery, men may use an electric razor 48 hours prior to surgery. 15. Shower the night before surgery with _x__Antibacterial soap _x__Hibiclens   16. To provide excellent care visitors will be limited to one in the room at any given time. 17.  Please bring picture ID and insurance card. 18.  Visit our web site for additional information:  [employer]/surgery.         Take atenolol, protonix, and levothyroxine LIUDMILA Quiles

## 2022-12-06 ENCOUNTER — ANESTHESIA EVENT (OUTPATIENT)
Dept: OPERATING ROOM | Age: 82
End: 2022-12-06
Payer: MEDICARE

## 2022-12-07 ENCOUNTER — ANESTHESIA (OUTPATIENT)
Dept: OPERATING ROOM | Age: 82
End: 2022-12-07
Payer: MEDICARE

## 2022-12-07 ENCOUNTER — HOSPITAL ENCOUNTER (INPATIENT)
Age: 82
LOS: 4 days | Discharge: HOME | DRG: 331 | End: 2022-12-11
Attending: SURGERY | Admitting: SURGERY
Payer: MEDICARE

## 2022-12-07 ENCOUNTER — APPOINTMENT (OUTPATIENT)
Dept: GENERAL RADIOLOGY | Age: 82
DRG: 331 | End: 2022-12-07
Attending: SURGERY
Payer: MEDICARE

## 2022-12-07 DIAGNOSIS — R19.00 ABDOMINAL MASS, UNSPECIFIED ABDOMINAL LOCATION: ICD-10-CM

## 2022-12-07 PROBLEM — Z85.51 HISTORY OF BLADDER CANCER: Status: ACTIVE | Noted: 2022-12-07

## 2022-12-07 PROBLEM — Z90.49 S/P SMALL BOWEL RESECTION: Status: ACTIVE | Noted: 2022-12-07

## 2022-12-07 LAB
ABO/RH: NORMAL
ANTIBODY SCREEN: NORMAL
GLUCOSE BLD-MCNC: 129 MG/DL (ref 70–99)
GLUCOSE BLD-MCNC: 153 MG/DL (ref 70–99)
GLUCOSE BLD-MCNC: 164 MG/DL (ref 70–99)
GLUCOSE BLD-MCNC: 164 MG/DL (ref 70–99)
PERFORMED ON: ABNORMAL

## 2022-12-07 PROCEDURE — 2709999900 HC NON-CHARGEABLE SUPPLY: Performed by: SURGERY

## 2022-12-07 PROCEDURE — 6360000002 HC RX W HCPCS: Performed by: ANESTHESIOLOGY

## 2022-12-07 PROCEDURE — 71045 X-RAY EXAM CHEST 1 VIEW: CPT

## 2022-12-07 PROCEDURE — 7100000000 HC PACU RECOVERY - FIRST 15 MIN: Performed by: SURGERY

## 2022-12-07 PROCEDURE — 2500000003 HC RX 250 WO HCPCS: Performed by: SURGERY

## 2022-12-07 PROCEDURE — 1200000000 HC SEMI PRIVATE

## 2022-12-07 PROCEDURE — S2900 ROBOTIC SURGICAL SYSTEM: HCPCS | Performed by: SURGERY

## 2022-12-07 PROCEDURE — 86850 RBC ANTIBODY SCREEN: CPT

## 2022-12-07 PROCEDURE — 2720000010 HC SURG SUPPLY STERILE: Performed by: SURGERY

## 2022-12-07 PROCEDURE — 88307 TISSUE EXAM BY PATHOLOGIST: CPT

## 2022-12-07 PROCEDURE — 94761 N-INVAS EAR/PLS OXIMETRY MLT: CPT

## 2022-12-07 PROCEDURE — 2700000000 HC OXYGEN THERAPY PER DAY

## 2022-12-07 PROCEDURE — 44120 REMOVAL OF SMALL INTESTINE: CPT | Performed by: SURGERY

## 2022-12-07 PROCEDURE — 3700000000 HC ANESTHESIA ATTENDED CARE: Performed by: SURGERY

## 2022-12-07 PROCEDURE — 7100000001 HC PACU RECOVERY - ADDTL 15 MIN: Performed by: SURGERY

## 2022-12-07 PROCEDURE — 86900 BLOOD TYPING SEROLOGIC ABO: CPT

## 2022-12-07 PROCEDURE — 3700000001 HC ADD 15 MINUTES (ANESTHESIA): Performed by: SURGERY

## 2022-12-07 PROCEDURE — 3600000009 HC SURGERY ROBOT BASE: Performed by: SURGERY

## 2022-12-07 PROCEDURE — 6370000000 HC RX 637 (ALT 250 FOR IP): Performed by: SURGERY

## 2022-12-07 PROCEDURE — 86901 BLOOD TYPING SEROLOGIC RH(D): CPT

## 2022-12-07 PROCEDURE — 6360000002 HC RX W HCPCS: Performed by: SURGERY

## 2022-12-07 PROCEDURE — 88341 IMHCHEM/IMCYTCHM EA ADD ANTB: CPT

## 2022-12-07 PROCEDURE — 2500000003 HC RX 250 WO HCPCS: Performed by: ANESTHESIOLOGY

## 2022-12-07 PROCEDURE — 88342 IMHCHEM/IMCYTCHM 1ST ANTB: CPT

## 2022-12-07 PROCEDURE — 3600000019 HC SURGERY ROBOT ADDTL 15MIN: Performed by: SURGERY

## 2022-12-07 PROCEDURE — 2580000003 HC RX 258: Performed by: ANESTHESIOLOGY

## 2022-12-07 PROCEDURE — 2580000003 HC RX 258: Performed by: SURGERY

## 2022-12-07 RX ORDER — SODIUM CHLORIDE 0.9 % (FLUSH) 0.9 %
5-40 SYRINGE (ML) INJECTION EVERY 12 HOURS SCHEDULED
Status: DISCONTINUED | OUTPATIENT
Start: 2022-12-07 | End: 2022-12-07 | Stop reason: HOSPADM

## 2022-12-07 RX ORDER — SODIUM CHLORIDE 9 MG/ML
INJECTION, SOLUTION INTRAVENOUS PRN
Status: DISCONTINUED | OUTPATIENT
Start: 2022-12-07 | End: 2022-12-07 | Stop reason: HOSPADM

## 2022-12-07 RX ORDER — SODIUM CHLORIDE, SODIUM LACTATE, POTASSIUM CHLORIDE, CALCIUM CHLORIDE 600; 310; 30; 20 MG/100ML; MG/100ML; MG/100ML; MG/100ML
INJECTION, SOLUTION INTRAVENOUS CONTINUOUS
Status: DISCONTINUED | OUTPATIENT
Start: 2022-12-07 | End: 2022-12-07

## 2022-12-07 RX ORDER — DEXTROSE MONOHYDRATE 100 MG/ML
INJECTION, SOLUTION INTRAVENOUS CONTINUOUS PRN
Status: DISCONTINUED | OUTPATIENT
Start: 2022-12-07 | End: 2022-12-11 | Stop reason: HOSPADM

## 2022-12-07 RX ORDER — OXYCODONE HYDROCHLORIDE 5 MG/1
10 TABLET ORAL EVERY 4 HOURS PRN
Status: DISCONTINUED | OUTPATIENT
Start: 2022-12-07 | End: 2022-12-11 | Stop reason: HOSPADM

## 2022-12-07 RX ORDER — OXYCODONE HYDROCHLORIDE 5 MG/1
5 TABLET ORAL EVERY 4 HOURS PRN
Status: DISCONTINUED | OUTPATIENT
Start: 2022-12-07 | End: 2022-12-11 | Stop reason: HOSPADM

## 2022-12-07 RX ORDER — DIPHENHYDRAMINE HYDROCHLORIDE 50 MG/ML
12.5 INJECTION INTRAMUSCULAR; INTRAVENOUS
Status: DISCONTINUED | OUTPATIENT
Start: 2022-12-07 | End: 2022-12-07 | Stop reason: HOSPADM

## 2022-12-07 RX ORDER — MEPERIDINE HYDROCHLORIDE 50 MG/ML
12.5 INJECTION INTRAMUSCULAR; INTRAVENOUS; SUBCUTANEOUS EVERY 5 MIN PRN
Status: DISCONTINUED | OUTPATIENT
Start: 2022-12-07 | End: 2022-12-07 | Stop reason: HOSPADM

## 2022-12-07 RX ORDER — FAMOTIDINE 20 MG/1
20 TABLET, FILM COATED ORAL DAILY
Status: DISCONTINUED | OUTPATIENT
Start: 2022-12-07 | End: 2022-12-11 | Stop reason: HOSPADM

## 2022-12-07 RX ORDER — FAMOTIDINE 10 MG/ML
20 INJECTION, SOLUTION INTRAVENOUS DAILY
Status: DISCONTINUED | OUTPATIENT
Start: 2022-12-07 | End: 2022-12-11 | Stop reason: HOSPADM

## 2022-12-07 RX ORDER — PROPOFOL 10 MG/ML
INJECTION, EMULSION INTRAVENOUS PRN
Status: DISCONTINUED | OUTPATIENT
Start: 2022-12-07 | End: 2022-12-07 | Stop reason: SDUPTHER

## 2022-12-07 RX ORDER — IPRATROPIUM BROMIDE AND ALBUTEROL SULFATE 2.5; .5 MG/3ML; MG/3ML
1 SOLUTION RESPIRATORY (INHALATION)
Status: DISCONTINUED | OUTPATIENT
Start: 2022-12-07 | End: 2022-12-07 | Stop reason: HOSPADM

## 2022-12-07 RX ORDER — ROCURONIUM BROMIDE 10 MG/ML
INJECTION, SOLUTION INTRAVENOUS PRN
Status: DISCONTINUED | OUTPATIENT
Start: 2022-12-07 | End: 2022-12-07 | Stop reason: SDUPTHER

## 2022-12-07 RX ORDER — SODIUM CHLORIDE 0.9 % (FLUSH) 0.9 %
5-40 SYRINGE (ML) INJECTION PRN
Status: DISCONTINUED | OUTPATIENT
Start: 2022-12-07 | End: 2022-12-07 | Stop reason: HOSPADM

## 2022-12-07 RX ORDER — INSULIN LISPRO 100 [IU]/ML
0-4 INJECTION, SOLUTION INTRAVENOUS; SUBCUTANEOUS EVERY 4 HOURS
Status: DISCONTINUED | OUTPATIENT
Start: 2022-12-07 | End: 2022-12-08

## 2022-12-07 RX ORDER — MIDAZOLAM HYDROCHLORIDE 1 MG/ML
INJECTION INTRAMUSCULAR; INTRAVENOUS PRN
Status: DISCONTINUED | OUTPATIENT
Start: 2022-12-07 | End: 2022-12-07 | Stop reason: SDUPTHER

## 2022-12-07 RX ORDER — DEXAMETHASONE SODIUM PHOSPHATE 4 MG/ML
INJECTION, SOLUTION INTRA-ARTICULAR; INTRALESIONAL; INTRAMUSCULAR; INTRAVENOUS; SOFT TISSUE PRN
Status: DISCONTINUED | OUTPATIENT
Start: 2022-12-07 | End: 2022-12-07 | Stop reason: SDUPTHER

## 2022-12-07 RX ORDER — SODIUM CHLORIDE, SODIUM LACTATE, POTASSIUM CHLORIDE, CALCIUM CHLORIDE 600; 310; 30; 20 MG/100ML; MG/100ML; MG/100ML; MG/100ML
INJECTION, SOLUTION INTRAVENOUS CONTINUOUS PRN
Status: DISCONTINUED | OUTPATIENT
Start: 2022-12-07 | End: 2022-12-07 | Stop reason: SDUPTHER

## 2022-12-07 RX ORDER — SODIUM CHLORIDE 9 MG/ML
INJECTION, SOLUTION INTRAVENOUS PRN
Status: DISCONTINUED | OUTPATIENT
Start: 2022-12-07 | End: 2022-12-11 | Stop reason: HOSPADM

## 2022-12-07 RX ORDER — LATANOPROST 50 UG/ML
1 SOLUTION/ DROPS OPHTHALMIC NIGHTLY
Status: DISCONTINUED | OUTPATIENT
Start: 2022-12-07 | End: 2022-12-11 | Stop reason: HOSPADM

## 2022-12-07 RX ORDER — ONDANSETRON 4 MG/1
4 TABLET, ORALLY DISINTEGRATING ORAL EVERY 8 HOURS PRN
Status: DISCONTINUED | OUTPATIENT
Start: 2022-12-07 | End: 2022-12-11 | Stop reason: HOSPADM

## 2022-12-07 RX ORDER — GLYCOPYRROLATE 1 MG/5 ML
SYRINGE (ML) INTRAVENOUS PRN
Status: DISCONTINUED | OUTPATIENT
Start: 2022-12-07 | End: 2022-12-07 | Stop reason: SDUPTHER

## 2022-12-07 RX ORDER — OXYCODONE HYDROCHLORIDE 5 MG/1
5 TABLET ORAL
Status: DISCONTINUED | OUTPATIENT
Start: 2022-12-07 | End: 2022-12-07 | Stop reason: HOSPADM

## 2022-12-07 RX ORDER — ONDANSETRON 2 MG/ML
4 INJECTION INTRAMUSCULAR; INTRAVENOUS EVERY 6 HOURS PRN
Status: DISCONTINUED | OUTPATIENT
Start: 2022-12-07 | End: 2022-12-11 | Stop reason: HOSPADM

## 2022-12-07 RX ORDER — INDOCYANINE GREEN AND WATER 25 MG
5 KIT INJECTION ONCE
Status: DISCONTINUED | OUTPATIENT
Start: 2022-12-07 | End: 2022-12-11 | Stop reason: HOSPADM

## 2022-12-07 RX ORDER — DEXAMETHASONE SODIUM PHOSPHATE 4 MG/ML
4 INJECTION, SOLUTION INTRA-ARTICULAR; INTRALESIONAL; INTRAMUSCULAR; INTRAVENOUS; SOFT TISSUE
Status: DISCONTINUED | OUTPATIENT
Start: 2022-12-07 | End: 2022-12-07 | Stop reason: HOSPADM

## 2022-12-07 RX ORDER — MORPHINE SULFATE 2 MG/ML
2 INJECTION, SOLUTION INTRAMUSCULAR; INTRAVENOUS
Status: DISCONTINUED | OUTPATIENT
Start: 2022-12-07 | End: 2022-12-11 | Stop reason: HOSPADM

## 2022-12-07 RX ORDER — SODIUM CHLORIDE 0.9 % (FLUSH) 0.9 %
5-40 SYRINGE (ML) INJECTION EVERY 12 HOURS SCHEDULED
Status: DISCONTINUED | OUTPATIENT
Start: 2022-12-07 | End: 2022-12-11 | Stop reason: HOSPADM

## 2022-12-07 RX ORDER — INSULIN LISPRO 100 [IU]/ML
0-8 INJECTION, SOLUTION INTRAVENOUS; SUBCUTANEOUS EVERY 4 HOURS
Status: DISCONTINUED | OUTPATIENT
Start: 2022-12-07 | End: 2022-12-11 | Stop reason: HOSPADM

## 2022-12-07 RX ORDER — ATENOLOL 25 MG/1
75 TABLET ORAL DAILY
Status: DISCONTINUED | OUTPATIENT
Start: 2022-12-07 | End: 2022-12-11 | Stop reason: HOSPADM

## 2022-12-07 RX ORDER — LIDOCAINE HYDROCHLORIDE 10 MG/ML
1 INJECTION, SOLUTION EPIDURAL; INFILTRATION; INTRACAUDAL; PERINEURAL
Status: DISCONTINUED | OUTPATIENT
Start: 2022-12-07 | End: 2022-12-07 | Stop reason: HOSPADM

## 2022-12-07 RX ORDER — SODIUM CHLORIDE 9 MG/ML
INJECTION, SOLUTION INTRAVENOUS CONTINUOUS
Status: DISCONTINUED | OUTPATIENT
Start: 2022-12-07 | End: 2022-12-10

## 2022-12-07 RX ORDER — MONTELUKAST SODIUM 10 MG/1
10 TABLET ORAL NIGHTLY
Status: DISCONTINUED | OUTPATIENT
Start: 2022-12-07 | End: 2022-12-11 | Stop reason: HOSPADM

## 2022-12-07 RX ORDER — MAGNESIUM HYDROXIDE/ALUMINUM HYDROXICE/SIMETHICONE 120; 1200; 1200 MG/30ML; MG/30ML; MG/30ML
15 SUSPENSION ORAL EVERY 6 HOURS PRN
Status: DISCONTINUED | OUTPATIENT
Start: 2022-12-07 | End: 2022-12-11 | Stop reason: HOSPADM

## 2022-12-07 RX ORDER — ACETAMINOPHEN 325 MG/1
650 TABLET ORAL
Status: DISCONTINUED | OUTPATIENT
Start: 2022-12-07 | End: 2022-12-07 | Stop reason: HOSPADM

## 2022-12-07 RX ORDER — ONDANSETRON 2 MG/ML
4 INJECTION INTRAMUSCULAR; INTRAVENOUS
Status: DISCONTINUED | OUTPATIENT
Start: 2022-12-07 | End: 2022-12-07 | Stop reason: HOSPADM

## 2022-12-07 RX ORDER — FENTANYL CITRATE 50 UG/ML
INJECTION, SOLUTION INTRAMUSCULAR; INTRAVENOUS PRN
Status: DISCONTINUED | OUTPATIENT
Start: 2022-12-07 | End: 2022-12-07 | Stop reason: SDUPTHER

## 2022-12-07 RX ORDER — BUPIVACAINE HYDROCHLORIDE 5 MG/ML
INJECTION, SOLUTION EPIDURAL; INTRACAUDAL PRN
Status: DISCONTINUED | OUTPATIENT
Start: 2022-12-07 | End: 2022-12-07 | Stop reason: ALTCHOICE

## 2022-12-07 RX ORDER — SODIUM CHLORIDE 0.9 % (FLUSH) 0.9 %
5-40 SYRINGE (ML) INJECTION PRN
Status: DISCONTINUED | OUTPATIENT
Start: 2022-12-07 | End: 2022-12-11 | Stop reason: HOSPADM

## 2022-12-07 RX ORDER — MORPHINE SULFATE 4 MG/ML
4 INJECTION, SOLUTION INTRAMUSCULAR; INTRAVENOUS
Status: DISCONTINUED | OUTPATIENT
Start: 2022-12-07 | End: 2022-12-11 | Stop reason: HOSPADM

## 2022-12-07 RX ORDER — SODIUM CHLORIDE, SODIUM LACTATE, POTASSIUM CHLORIDE, CALCIUM CHLORIDE 600; 310; 30; 20 MG/100ML; MG/100ML; MG/100ML; MG/100ML
INJECTION, SOLUTION INTRAVENOUS CONTINUOUS
Status: DISCONTINUED | OUTPATIENT
Start: 2022-12-07 | End: 2022-12-07 | Stop reason: HOSPADM

## 2022-12-07 RX ORDER — ACETAMINOPHEN 325 MG/1
650 TABLET ORAL EVERY 4 HOURS PRN
Status: DISCONTINUED | OUTPATIENT
Start: 2022-12-07 | End: 2022-12-11 | Stop reason: HOSPADM

## 2022-12-07 RX ORDER — LIDOCAINE HYDROCHLORIDE 20 MG/ML
INJECTION, SOLUTION INFILTRATION; PERINEURAL PRN
Status: DISCONTINUED | OUTPATIENT
Start: 2022-12-07 | End: 2022-12-07 | Stop reason: SDUPTHER

## 2022-12-07 RX ORDER — MIDAZOLAM HYDROCHLORIDE 1 MG/ML
2 INJECTION INTRAMUSCULAR; INTRAVENOUS
Status: DISCONTINUED | OUTPATIENT
Start: 2022-12-07 | End: 2022-12-07 | Stop reason: HOSPADM

## 2022-12-07 RX ORDER — HYDRALAZINE HYDROCHLORIDE 20 MG/ML
10 INJECTION INTRAMUSCULAR; INTRAVENOUS
Status: DISCONTINUED | OUTPATIENT
Start: 2022-12-07 | End: 2022-12-07 | Stop reason: HOSPADM

## 2022-12-07 RX ADMIN — PHENYLEPHRINE HYDROCHLORIDE 100 MCG: 10 INJECTION INTRAVENOUS at 10:59

## 2022-12-07 RX ADMIN — ROCURONIUM BROMIDE 20 MG: 10 SOLUTION INTRAVENOUS at 11:07

## 2022-12-07 RX ADMIN — SODIUM CHLORIDE, SODIUM LACTATE, POTASSIUM CHLORIDE, AND CALCIUM CHLORIDE: .6; .31; .03; .02 INJECTION, SOLUTION INTRAVENOUS at 11:34

## 2022-12-07 RX ADMIN — CEFAZOLIN 2000 MG: 10 INJECTION, POWDER, FOR SOLUTION INTRAVENOUS at 09:59

## 2022-12-07 RX ADMIN — SODIUM CHLORIDE, PRESERVATIVE FREE 10 ML: 5 INJECTION INTRAVENOUS at 21:24

## 2022-12-07 RX ADMIN — Medication 0.2 MG: at 11:51

## 2022-12-07 RX ADMIN — PROPOFOL 200 MG: 10 INJECTION, EMULSION INTRAVENOUS at 09:59

## 2022-12-07 RX ADMIN — PHENYLEPHRINE HYDROCHLORIDE 100 MCG: 10 INJECTION INTRAVENOUS at 12:20

## 2022-12-07 RX ADMIN — SODIUM CHLORIDE: 9 INJECTION, SOLUTION INTRAVENOUS at 23:59

## 2022-12-07 RX ADMIN — SUGAMMADEX 200 MG: 100 INJECTION, SOLUTION INTRAVENOUS at 12:14

## 2022-12-07 RX ADMIN — MORPHINE SULFATE 4 MG: 4 INJECTION, SOLUTION INTRAMUSCULAR; INTRAVENOUS at 17:15

## 2022-12-07 RX ADMIN — Medication 0.2 MG: at 11:29

## 2022-12-07 RX ADMIN — PHENYLEPHRINE HYDROCHLORIDE 100 MCG: 10 INJECTION INTRAVENOUS at 09:59

## 2022-12-07 RX ADMIN — PHENYLEPHRINE HYDROCHLORIDE 100 MCG: 10 INJECTION INTRAVENOUS at 11:46

## 2022-12-07 RX ADMIN — ROCURONIUM BROMIDE 50 MG: 10 SOLUTION INTRAVENOUS at 09:59

## 2022-12-07 RX ADMIN — MONTELUKAST SODIUM 10 MG: 10 TABLET, COATED ORAL at 21:17

## 2022-12-07 RX ADMIN — SODIUM CHLORIDE, SODIUM LACTATE, POTASSIUM CHLORIDE, AND CALCIUM CHLORIDE: .6; .31; .03; .02 INJECTION, SOLUTION INTRAVENOUS at 09:45

## 2022-12-07 RX ADMIN — LIDOCAINE HYDROCHLORIDE 6 MG: 20 INJECTION, SOLUTION INFILTRATION; PERINEURAL at 09:59

## 2022-12-07 RX ADMIN — DEXAMETHASONE SODIUM PHOSPHATE 4 MG: 4 INJECTION, SOLUTION INTRAMUSCULAR; INTRAVENOUS at 12:04

## 2022-12-07 RX ADMIN — HYDROMORPHONE HYDROCHLORIDE 0.5 MG: 1 INJECTION, SOLUTION INTRAMUSCULAR; INTRAVENOUS; SUBCUTANEOUS at 13:20

## 2022-12-07 RX ADMIN — FAMOTIDINE 20 MG: 10 INJECTION, SOLUTION INTRAVENOUS at 17:15

## 2022-12-07 RX ADMIN — SODIUM CHLORIDE: 9 INJECTION, SOLUTION INTRAVENOUS at 16:25

## 2022-12-07 RX ADMIN — MIDAZOLAM HYDROCHLORIDE 2 MG: 2 INJECTION, SOLUTION INTRAMUSCULAR; INTRAVENOUS at 09:59

## 2022-12-07 RX ADMIN — PHENYLEPHRINE HYDROCHLORIDE 100 MCG: 10 INJECTION INTRAVENOUS at 10:25

## 2022-12-07 RX ADMIN — MORPHINE SULFATE 4 MG: 4 INJECTION, SOLUTION INTRAMUSCULAR; INTRAVENOUS at 21:17

## 2022-12-07 RX ADMIN — ROCURONIUM BROMIDE 30 MG: 10 SOLUTION INTRAVENOUS at 11:25

## 2022-12-07 RX ADMIN — FENTANYL CITRATE 150 MCG: 50 INJECTION, SOLUTION INTRAMUSCULAR; INTRAVENOUS at 09:59

## 2022-12-07 ASSESSMENT — PAIN DESCRIPTION - DESCRIPTORS
DESCRIPTORS: PRESSURE;TIGHTNESS
DESCRIPTORS: DISCOMFORT;CRAMPING
DESCRIPTORS: ACHING
DESCRIPTORS: ACHING;DULL

## 2022-12-07 ASSESSMENT — PAIN DESCRIPTION - LOCATION
LOCATION: ABDOMEN
LOCATION: SHOULDER
LOCATION: ABDOMEN

## 2022-12-07 ASSESSMENT — PAIN DESCRIPTION - ORIENTATION
ORIENTATION: MID;UPPER
ORIENTATION: RIGHT
ORIENTATION: MID;RIGHT;LEFT
ORIENTATION: LEFT;RIGHT;MID

## 2022-12-07 ASSESSMENT — PAIN SCALES - GENERAL
PAINLEVEL_OUTOF10: 7
PAINLEVEL_OUTOF10: 5
PAINLEVEL_OUTOF10: 6
PAINLEVEL_OUTOF10: 6
PAINLEVEL_OUTOF10: 8
PAINLEVEL_OUTOF10: 7

## 2022-12-07 ASSESSMENT — PAIN - FUNCTIONAL ASSESSMENT: PAIN_FUNCTIONAL_ASSESSMENT: ACTIVITIES ARE NOT PREVENTED

## 2022-12-07 NOTE — CONSULTS
Consult placed    Renny:evan  Date:12/7/2022,  Time:4:23 PM        Electronically signed by Gibson Olivares on 12/7/2022 at 4:23 PM

## 2022-12-07 NOTE — BRIEF OP NOTE
Brief Postoperative Note      Patient: Marcella Dennis  YOB: 1940  MRN: [id number]    Date of Procedure: 12/7/2022    Pre-Op Diagnosis: LOWER BOWEL MASS    Post-Op Diagnosis:  jejunal mesenteric lesion       Procedure(s):  ROBOTIC SMALL BOWEL RESECTION    Surgeon(s):  Guera Wilde MD    Assistant:  Surgical Assistant: Erin Danielle; Sandra Bean    Anesthesia: General    Estimated Blood Loss (mL): less than 535     Complications: None    Specimens:   ID Type Source Tests Collected by Time Destination   A : PORTION OF JEJUNUM, STITCH IS PROXIMAL MARGIN Tissue Duodenum SURGICAL PATHOLOGY Guera Wilde MD 12/7/2022 1158        Implants:  * No implants in log *      Drains: * No LDAs found *    Findings: focal area of proximal jejunum with underlying mesenteric thickening/mass causing swirling of the overlying bowel loops               Robotic-assisted jejunal resection w/ underlying mesenteric resection, with side-side stapled jejunal anastomosis      Job#: [id number]    Electronically signed by Sravan Owen MD on 12/7/2022 at 11:28 PM

## 2022-12-07 NOTE — ANESTHESIA PRE PROCEDURE
Department of Anesthesiology  Preprocedure Note       Name:  Sanya Nichols   Age:  [de-identified] y.o.  :  1940                                          MRN:  [id number]         Date:  2022      Surgeon: Megha Castillo):  Nicola Hensley MD    Procedure: Procedure(s):  ROBOTIC SMALL BOWEL RESECTION, POSSIBLE OPEN PROCEDURE    Medications prior to admission:   Prior to Admission medications    Medication Sig Start Date End Date Taking?  Authorizing Provider   aspirin 81 MG EC tablet Take 81 mg by mouth daily   Yes Historical Provider, MD   Latanoprostene Bunod (VYZULTA) 0.024 % SOLN Apply 1 drop to eye nightly Both eyes   Yes Historical Provider, MD   pantoprazole sodium (PROTONIX) 40 MG PACK packet Take 40 mg by mouth every morning (before breakfast)    Historical Provider, MD   atenolol (TENORMIN) 50 MG tablet Take 1.5 tablets by mouth daily 22   Pushpa Escamilla MD   hydroCHLOROthiazide (HYDRODIURIL) 25 MG tablet Take 1 tablet by mouth every morning 22   Pushpa Escamilla MD   glipiZIDE (GLUCOTROL XL) 2.5 MG extended release tablet Take 2.5 mg by mouth daily    Historical Provider, MD   Glucosamine 500 MG CAPS Take by mouth    Historical Provider, MD   escitalopram (LEXAPRO) 10 MG tablet Take 10 mg by mouth daily    Historical Provider, MD   latanoprost (XALATAN) 0.005 % ophthalmic solution 1 drop nightly    Historical Provider, MD   montelukast (SINGULAIR) 10 MG tablet Take 10 mg by mouth nightly    Historical Provider, MD   simvastatin (ZOCOR) 10 MG tablet Take 10 mg by mouth nightly    Historical Provider, MD   lisinopril (PRINIVIL;ZESTRIL) 40 MG tablet Take 40 mg by mouth nightly    Historical Provider, MD   metFORMIN (GLUCOPHAGE) 1000 MG tablet Take 850 mg by mouth 3 times daily    Historical Provider, MD   Calcium Carbonate (CALTRATE 600 PO) Take by mouth 2 times daily  Patient not taking: No sig reported    Historical Provider, MD       Current medications:    Current Facility-Administered Medications   Medication Dose Route Frequency Provider Last Rate Last Admin    lidocaine PF 1 % injection 1 mL  1 mL IntraDERmal Once PRN Suzy Mckenzie MD        lactated ringers infusion   IntraVENous Continuous Suzy Mckenzie MD        sodium chloride flush 0.9 % injection 5-40 mL  5-40 mL IntraVENous 2 times per day Suzy Mckenzie MD        sodium chloride flush 0.9 % injection 5-40 mL  5-40 mL IntraVENous PRN Suzy Mckenzie MD        0.9 % sodium chloride infusion   IntraVENous PRN Suzy Mckenzie MD        sodium chloride flush 0.9 % injection 5-40 mL  5-40 mL IntraVENous 2 times per day Nicola Hensley MD        sodium chloride flush 0.9 % injection 5-40 mL  5-40 mL IntraVENous PRN Nicola Hensley MD        0.9 % sodium chloride infusion   IntraVENous PRN Nicola Hensley MD        ceFAZolin (ANCEF) 2000 mg in dextrose 5 % 100 mL IVPB  2,000 mg IntraVENous On Call to [address] [place] [address], MD        CEFAZOLIN 2000 MG D5W 100 ML IVPB IVPB                Allergies:     Allergies   Allergen Reactions    Z-Alvarado [Azithromycin] Hives       Problem List:    Patient Active Problem List   Diagnosis Code    Abnormal EKG R94.31    Essential hypertension I10    Mixed hyperlipidemia E78.2    Type 2 diabetes mellitus ([address] E11.9    Left ankle swelling, not cardiac in nature M25.472    Anemia D64.9    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    Preoperative clearance Z01.818       Past Medical History:        Diagnosis Date    Cancer ([address]     bladder    Diabetes mellitus ([address]     Hyperlipidemia     Hypertension     Seasonal allergies     Thyroid disease        Past Surgical History:        Procedure Laterality Date    CHOLECYSTECTOMY      COLONOSCOPY  2002    normal per pt    COLONOSCOPY  2016    normal    CYSTOSCOPY      bladder ca burned off  multiple procedures    ENDOSCOPY, COLON, DIAGNOSTIC      HYSTERECTOMY (CERVIX STATUS UNKNOWN)      OVARY REMOVAL         Social History:    Social History     Tobacco Use    Smoking status: Never    Smokeless tobacco: Never   Substance Use Topics    Alcohol use: No                                Counseling given: Not Answered      Vital Signs (Current):   Vitals:    11/30/22 1150 12/07/22 0856   BP:  (!) 160/71   Pulse:  57   Resp:  16   Temp:  97.4 °F (36.3 °C)   TempSrc:  Temporal   SpO2:  98%   Weight: 160 lb (72.6 kg) 162 lb (73.5 kg)   Height: 5' 4\" (1.626 m) 5' 4\" (1.626 m)                                              BP Readings from Last 3 Encounters:   12/07/22 (!) 160/71   11/11/22 (!) 160/80   11/04/22 (!) 148/68       NPO Status: Time of last liquid consumption: 0600                        Time of last solid consumption: 1800                        Date of last liquid consumption: 12/07/22                        Date of last solid food consumption: 12/06/22    BMI:   Wt Readings from Last 3 Encounters:   12/07/22 162 lb (73.5 kg)   11/11/22 160 lb (72.6 kg)   11/04/22 159 lb 12.8 oz (72.5 kg)     Body mass index is 27.81 kg/m².     CBC:   Lab Results   Component Value Date/Time    WBC 9.9 11/04/2022 02:09 PM    RBC 4.82 11/04/2022 02:09 PM    HGB 10.7 11/04/2022 02:09 PM    HCT 36.9 11/04/2022 02:09 PM    MCV 76.5 11/04/2022 02:09 PM    RDW 34.8 11/04/2022 02:09 PM     11/04/2022 02:09 PM       CMP:   Lab Results   Component Value Date/Time     10/27/2022 06:34 AM    K 3.8 10/27/2022 06:34 AM     10/27/2022 06:34 AM    CO2 28 10/27/2022 06:34 AM    BUN 12 10/27/2022 06:34 AM    CREATININE 0.9 10/27/2022 06:34 AM    GFRAA >60 03/06/2022 06:26 PM    AGRATIO 1.9 10/24/2022 07:10 PM    LABGLOM >60 10/27/2022 06:34 AM    GLUCOSE 98 10/27/2022 06:34 AM    PROT 6.4 10/24/2022 07:10 PM    CALCIUM 8.8 10/27/2022 06:34 AM    BILITOT 0.7 10/24/2022 07:10 PM    ALKPHOS 43 10/24/2022 07:10 PM    AST 19 10/24/2022 07:10 PM    ALT 16 10/24/2022 07:10 PM       POC Tests:   Recent Labs 12/07/22  0841   POCGLU 164*       Coags:   Lab Results   Component Value Date/Time    PROTIME 15.2 10/24/2022 10:32 PM    INR 1.20 10/24/2022 10:32 PM       HCG (If Applicable): No results found for: PREGTESTUR, PREGSERUM, HCG, HCGQUANT     ABGs: No results found for: PHART, PO2ART, PEA5RFP, FKZ8DMF, BEART, W0YMXYKL     Type & Screen (If Applicable):  No results found for: LABABO, LABRH    Drug/Infectious Status (If Applicable):  No results found for: HIV, HEPCAB    COVID-19 Screening (If Applicable): No results found for: COVID19        Anesthesia Evaluation  Patient summary reviewed and Nursing notes reviewed  Airway: Mallampati: II  TM distance: >3 FB   Neck ROM: full  Mouth opening: > = 3 FB   Dental: normal exam         Pulmonary:Negative Pulmonary ROS and normal exam                               Cardiovascular:    (+) hypertension:,                   Neuro/Psych:   Negative Neuro/Psych ROS              GI/Hepatic/Renal: Neg GI/Hepatic/Renal ROS            Endo/Other:    (+) DiabetesType II DM, , .                 Abdominal:             Vascular: negative vascular ROS. Other Findings:           Anesthesia Plan      general     ASA 3       Induction: intravenous. MIPS: Postoperative opioids intended. Anesthetic plan and risks discussed with patient. Plan discussed with CRNA.     Attending anesthesiologist reviewed and agrees with Preprocedure content                CHONG Cooper MD   12/7/2022

## 2022-12-07 NOTE — PROGRESS NOTES
Patient admitted to hospitals 4 in preparation for surgery, VSS. Consent confirmed. IV inserted into right and left hands, warm LR infusing. Belongings on cart.

## 2022-12-07 NOTE — PROGRESS NOTES
Admission completed and charted. VSS. Pt placed on 2L NC while in bed, stat 90% on RA. 5lap surgical incisions, C/D/I at this time, ANUJ. PRN pain meds given, per pt c/o 7/10 abd pain. SCDs in place. Bed alarm on. IS at bedside, pt able to demonstrate proper usage. Bed locked and in lowest position. Call light within reach. Pt denies any other needs at this time. Will continue to monitor.

## 2022-12-07 NOTE — H&P
I have reviewed the history and physical and examined the patient. I find no relevant changes. I have reviewed with the patient and/or family members, during the preoperative office visit the risks, benefits, and alternatives to the procedure.     Krishna Mejias MD

## 2022-12-07 NOTE — ANESTHESIA POSTPROCEDURE EVALUATION
Department of Anesthesiology  Postprocedure Note    Patient: Corrine Perkins  MRN: [id number]  YOB: 1940  Date of evaluation: 12/7/2022      Procedure Summary     Date: 12/07/22 Room / Location: [place] [address] / [facility]    Anesthesia Start: 4458 Anesthesia Stop:     Procedure: ROBOTIC SMALL BOWEL RESECTION (Abdomen) Diagnosis:       Abdominal mass, unspecified abdominal location      (LOWER BOWEL MASS)    Surgeons: Leanne Solares MD Responsible Provider: Tammy Knowles MD    Anesthesia Type: general ASA Status: 3          Anesthesia Type: No value filed.     Don Phase I: Don Score: 6    Don Phase II:        Anesthesia Post Evaluation    Patient location during evaluation: PACU  Patient participation: complete - patient participated  Level of consciousness: awake  Pain score: 0  Airway patency: patent  Nausea & Vomiting: no nausea  Complications: no  Cardiovascular status: blood pressure returned to baseline  Respiratory status: acceptable  Hydration status: euvolemic

## 2022-12-07 NOTE — PROGRESS NOTES
4 Eyes Skin Assessment     The patient is being assess for  Admission    I agree that 2 RN's have performed a thorough Head to Toe Skin Assessment on the patient. ALL assessment sites listed below have been assessed. Areas assessed by both nurses: MARLA Medina  [x]   Head, Face, and Ears   [x]   Shoulders, Back, and Chest  [x]   Arms, Elbows, and Hands   [x]   Coccyx, Sacrum, and Ischum  [x]   Legs, Feet, and Heels        Does the Patient have Skin Breakdown?   No         Garret Prevention initiated:  No   Wound Care Orders initiated:  No      [facility] nurse consulted for Pressure Injury (Stage 3,4, Unstageable, DTI, NWPT, and Complex wounds):  No      Nurse 1 eSignature: Electronically signed by Hyacinth Scott RN on 12/7/22 at 5:40 PM EST    **SHARE this note so that the co-signing nurse is able to place an eSignature**    Nurse 2 eSignature: {Esignature:[id number]}

## 2022-12-08 LAB
ANION GAP SERPL CALCULATED.3IONS-SCNC: 7 MMOL/L (ref 3–16)
BASOPHILS ABSOLUTE: 0 K/UL (ref 0–0.2)
BASOPHILS RELATIVE PERCENT: 0.3 %
BUN BLDV-MCNC: 19 MG/DL (ref 7–20)
CALCIUM SERPL-MCNC: 8.6 MG/DL (ref 8.3–10.6)
CHLORIDE BLD-SCNC: 102 MMOL/L (ref 99–110)
CO2: 28 MMOL/L (ref 21–32)
CREAT SERPL-MCNC: 0.8 MG/DL (ref 0.6–1.2)
EKG ATRIAL RATE: 52 BPM
EKG DIAGNOSIS: NORMAL
EKG P AXIS: 28 DEGREES
EKG P-R INTERVAL: 194 MS
EKG Q-T INTERVAL: 488 MS
EKG QRS DURATION: 90 MS
EKG QTC CALCULATION (BAZETT): 453 MS
EKG R AXIS: -22 DEGREES
EKG T AXIS: -15 DEGREES
EKG VENTRICULAR RATE: 52 BPM
EOSINOPHILS ABSOLUTE: 0 K/UL (ref 0–0.6)
EOSINOPHILS RELATIVE PERCENT: 0.1 %
GFR SERPL CREATININE-BSD FRML MDRD: >60 ML/MIN/{1.73_M2}
GLUCOSE BLD-MCNC: 117 MG/DL (ref 70–99)
GLUCOSE BLD-MCNC: 123 MG/DL (ref 70–99)
GLUCOSE BLD-MCNC: 127 MG/DL (ref 70–99)
GLUCOSE BLD-MCNC: 128 MG/DL (ref 70–99)
GLUCOSE BLD-MCNC: 129 MG/DL (ref 70–99)
GLUCOSE BLD-MCNC: 133 MG/DL (ref 70–99)
GLUCOSE BLD-MCNC: 137 MG/DL (ref 70–99)
GLUCOSE BLD-MCNC: 165 MG/DL (ref 70–99)
HCT VFR BLD CALC: 31.4 % (ref 36–48)
HEMOGLOBIN: 9.9 G/DL (ref 12–16)
LYMPHOCYTES ABSOLUTE: 0.7 K/UL (ref 1–5.1)
LYMPHOCYTES RELATIVE PERCENT: 5.3 %
MCH RBC QN AUTO: 25.1 PG (ref 26–34)
MCHC RBC AUTO-ENTMCNC: 31.5 G/DL (ref 31–36)
MCV RBC AUTO: 79.8 FL (ref 80–100)
MONOCYTES ABSOLUTE: 0.9 K/UL (ref 0–1.3)
MONOCYTES RELATIVE PERCENT: 7.2 %
NEUTROPHILS ABSOLUTE: 11.4 K/UL (ref 1.7–7.7)
NEUTROPHILS RELATIVE PERCENT: 87.1 %
PDW BLD-RTO: 28.6 % (ref 12.4–15.4)
PERFORMED ON: ABNORMAL
PLATELET # BLD: 337 K/UL (ref 135–450)
PMV BLD AUTO: 8.4 FL (ref 5–10.5)
POTASSIUM SERPL-SCNC: 3.8 MMOL/L (ref 3.5–5.1)
RBC # BLD: 3.94 M/UL (ref 4–5.2)
SODIUM BLD-SCNC: 137 MMOL/L (ref 136–145)
WBC # BLD: 13.1 K/UL (ref 4–11)

## 2022-12-08 PROCEDURE — 80048 BASIC METABOLIC PNL TOTAL CA: CPT

## 2022-12-08 PROCEDURE — 2580000003 HC RX 258: Performed by: SURGERY

## 2022-12-08 PROCEDURE — 36415 COLL VENOUS BLD VENIPUNCTURE: CPT

## 2022-12-08 PROCEDURE — 6370000000 HC RX 637 (ALT 250 FOR IP): Performed by: INTERNAL MEDICINE

## 2022-12-08 PROCEDURE — 99024 POSTOP FOLLOW-UP VISIT: CPT | Performed by: SURGERY

## 2022-12-08 PROCEDURE — 1200000000 HC SEMI PRIVATE

## 2022-12-08 PROCEDURE — 83036 HEMOGLOBIN GLYCOSYLATED A1C: CPT

## 2022-12-08 PROCEDURE — 93010 ELECTROCARDIOGRAM REPORT: CPT | Performed by: INTERNAL MEDICINE

## 2022-12-08 PROCEDURE — 93005 ELECTROCARDIOGRAM TRACING: CPT | Performed by: HOSPITALIST

## 2022-12-08 PROCEDURE — APPSS30 APP SPLIT SHARED TIME 16-30 MINUTES: Performed by: CLINICAL NURSE SPECIALIST

## 2022-12-08 PROCEDURE — 2700000000 HC OXYGEN THERAPY PER DAY

## 2022-12-08 PROCEDURE — 0DBA0ZZ EXCISION OF JEJUNUM, OPEN APPROACH: ICD-10-PCS | Performed by: SURGERY

## 2022-12-08 PROCEDURE — 6370000000 HC RX 637 (ALT 250 FOR IP): Performed by: SURGERY

## 2022-12-08 PROCEDURE — 85025 COMPLETE CBC W/AUTO DIFF WBC: CPT

## 2022-12-08 PROCEDURE — 6360000002 HC RX W HCPCS: Performed by: SURGERY

## 2022-12-08 RX ORDER — LISINOPRIL 20 MG/1
40 TABLET ORAL DAILY
Status: DISCONTINUED | OUTPATIENT
Start: 2022-12-08 | End: 2022-12-11 | Stop reason: HOSPADM

## 2022-12-08 RX ORDER — HYDROCHLOROTHIAZIDE 25 MG/1
25 TABLET ORAL DAILY
Status: DISCONTINUED | OUTPATIENT
Start: 2022-12-08 | End: 2022-12-11 | Stop reason: HOSPADM

## 2022-12-08 RX ADMIN — SODIUM CHLORIDE, PRESERVATIVE FREE 10 ML: 5 INJECTION INTRAVENOUS at 08:15

## 2022-12-08 RX ADMIN — MORPHINE SULFATE 4 MG: 4 INJECTION, SOLUTION INTRAMUSCULAR; INTRAVENOUS at 06:39

## 2022-12-08 RX ADMIN — MORPHINE SULFATE 2 MG: 2 INJECTION, SOLUTION INTRAMUSCULAR; INTRAVENOUS at 00:03

## 2022-12-08 RX ADMIN — SODIUM CHLORIDE: 9 INJECTION, SOLUTION INTRAVENOUS at 06:42

## 2022-12-08 RX ADMIN — HYDROCHLOROTHIAZIDE 25 MG: 25 TABLET ORAL at 11:24

## 2022-12-08 RX ADMIN — MORPHINE SULFATE 2 MG: 2 INJECTION, SOLUTION INTRAMUSCULAR; INTRAVENOUS at 04:43

## 2022-12-08 RX ADMIN — MORPHINE SULFATE 4 MG: 4 INJECTION, SOLUTION INTRAMUSCULAR; INTRAVENOUS at 14:29

## 2022-12-08 RX ADMIN — MORPHINE SULFATE 4 MG: 4 INJECTION, SOLUTION INTRAMUSCULAR; INTRAVENOUS at 22:11

## 2022-12-08 RX ADMIN — LISINOPRIL 40 MG: 20 TABLET ORAL at 11:24

## 2022-12-08 RX ADMIN — FAMOTIDINE 20 MG: 20 TABLET ORAL at 08:15

## 2022-12-08 RX ADMIN — SODIUM CHLORIDE, PRESERVATIVE FREE 10 ML: 5 INJECTION INTRAVENOUS at 22:49

## 2022-12-08 RX ADMIN — MONTELUKAST SODIUM 10 MG: 10 TABLET, COATED ORAL at 22:11

## 2022-12-08 ASSESSMENT — PAIN SCALES - GENERAL
PAINLEVEL_OUTOF10: 4
PAINLEVEL_OUTOF10: 7
PAINLEVEL_OUTOF10: 6
PAINLEVEL_OUTOF10: 5
PAINLEVEL_OUTOF10: 5

## 2022-12-08 ASSESSMENT — PAIN DESCRIPTION - DESCRIPTORS
DESCRIPTORS: SHARP;DISCOMFORT
DESCRIPTORS: SHARP
DESCRIPTORS: SORE
DESCRIPTORS: DISCOMFORT;CRUSHING;ACHING
DESCRIPTORS: SORE

## 2022-12-08 ASSESSMENT — PAIN DESCRIPTION - LOCATION
LOCATION: ABDOMEN
LOCATION: SHOULDER
LOCATION: ABDOMEN

## 2022-12-08 ASSESSMENT — PAIN - FUNCTIONAL ASSESSMENT
PAIN_FUNCTIONAL_ASSESSMENT: ACTIVITIES ARE NOT PREVENTED

## 2022-12-08 ASSESSMENT — PAIN DESCRIPTION - ORIENTATION
ORIENTATION: RIGHT
ORIENTATION: RIGHT
ORIENTATION: LEFT;RIGHT;MID

## 2022-12-08 NOTE — PROGRESS NOTES
Shift assessment completed and charted. VSS. A&OX4. 5lap, surgical glue, ANUJ. BS hypoactive, not passing flatus at this time, per pt. Up to chair this AM, tolerating. CHG wipes completed and charted. Bed locked and in lowest position. Call light within reach. Pt denies any other needs at this time. Will continue to monitor.

## 2022-12-08 NOTE — PROGRESS NOTES
[facility] GENERAL SURGERY    Surgery Progress Note           POD # 1    PATIENT NAME: Sanya Nichols     TODAY'S DATE: 12/8/2022    INTERVAL HISTORY:    Pt sitting up in chair, feeling okay. Reports mild incisional pain and having more shoulder pain from gas. Denies nausea. No flatus. Has been elizabeth ice chips and reports had a soda last night     OBJECTIVE:   VITALS:  /63   Pulse 53   Temp 97.7 °F (36.5 °C) (Oral)   Resp 16   Ht 5' 4\" (1.626 m)   Wt 162 lb (73.5 kg)   SpO2 90%   BMI 27.81 kg/m²     INTAKE/OUTPUT:    I/O last 3 completed shifts: In: 1692.6 [I.V.:1692.6]  Out: -   I/O this shift:  In: 10 [P.O.:10]  Out: -               CONSTITUTIONAL:  awake and alert  LUNGS:  no crackles or wheezing  ABDOMEN:   hypoactive bowel sounds, soft, non-distended, mildly tender   INCISION: clean, dry, no drainage    Data:  CBC:   Recent Labs     12/08/22  0609   WBC 13.1*   HGB 9.9*   HCT 31.4*        BMP:    Recent Labs     12/08/22  0609      K 3.8      CO2 28   BUN 19   CREATININE 0.8   GLUCOSE 123*         ASSESSMENT AND PLAN:  [de-identified] y.o. female status post Robotic-assisted small bowel and mesentery  resection. GI: clear liquids today  CVD: defer to IM. Nursing holding metoprolol due to bradycardia, however pt reports this is her baseline  Activity: ambulate with assistance. Dispo: likely 2-3 days     Electronically signed by OFELIA Avila - CNP     Surgery Staff    I have examined this patient, and read and agree with the note by Manny Garcia CNP from today; more than half of the total time was spent by me on the encounter. Await return of bowel function and then can advance diet. Await pathology results.   Dispo - as above    German Ruiz MD

## 2022-12-08 NOTE — CONSULTS
Hospital Medicine  Consult History & Physical        Chief Complaint: Jejunal mass    Date of Service: Pt seen/examined in consultation on 12/7/2022    History Of Present Illness:      [de-identified] y.o. 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 Hospitalist consulted to manage chronic comorbid conditions including HTN, type II DM, and hypothyroidism    Past Medical History:        Diagnosis Date    Cancer [address] [address]     bladder    Diabetes mellitus ([facility] [address] [facility]     Hyperlipidemia     Hypertension     Seasonal allergies     Thyroid disease        Past Surgical History:        Procedure Laterality Date    CHOLECYSTECTOMY      COLONOSCOPY  2002    normal per pt    COLONOSCOPY  2016    normal    CYSTOSCOPY      bladder ca burned off  multiple procedures    ENDOSCOPY, COLON, DIAGNOSTIC      HYSTERECTOMY (CERVIX STATUS UNKNOWN)      OVARY REMOVAL         Medications Prior to Admission:    Prior to Admission medications    Medication Sig Start Date End Date Taking?  Authorizing Provider   aspirin 81 MG EC tablet Take 81 mg by mouth daily   Yes Historical Provider, MD   Latanoprostene Bunod (VYZULTA) 0.024 % SOLN Apply 1 drop to eye nightly Both eyes   Yes Historical Provider, MD   pantoprazole sodium (PROTONIX) 40 MG PACK packet Take 40 mg by mouth every morning (before breakfast)    Historical Provider, MD   atenolol (TENORMIN) 50 MG tablet Take 1.5 tablets by mouth daily 11/11/22   Cheryl Mackay MD   hydroCHLOROthiazide (HYDRODIURIL) 25 MG tablet Take 1 tablet by mouth every morning 11/11/22   Cheryl Mackay MD   glipiZIDE (GLUCOTROL XL) 2.5 MG extended release tablet Take 2.5 mg by mouth daily    Historical Provider, MD   Glucosamine 500 MG CAPS Take by mouth    Historical Provider, MD   escitalopram (LEXAPRO) 10 MG tablet Take 20 mg by mouth daily    Historical Provider, MD   latanoprost (XALATAN) 0.005 % ophthalmic solution 1 drop nightly  Patient not taking: Reported on 12/7/2022    Historical Provider, MD   montelukast (SINGULAIR) 10 MG tablet Take 10 mg by mouth nightly    Historical Provider, MD   simvastatin (ZOCOR) 10 MG tablet Take 10 mg by mouth nightly    Historical Provider, MD   lisinopril (PRINIVIL;ZESTRIL) 40 MG tablet Take 40 mg by mouth nightly    Historical Provider, MD   metFORMIN (GLUCOPHAGE) 1000 MG tablet Take 850 mg by mouth 3 times daily    Historical Provider, MD   Calcium Carbonate (CALTRATE 600 PO) Take by mouth 2 times daily  Patient not taking: No sig reported    Historical Provider, MD       Allergies:  Z-ani [azithromycin]    Social History: On the office yesterday    TOBACCO:   reports that she has never smoked. She has never used smokeless tobacco.  ETOH:   reports no history of alcohol use. Family History:      Reviewed in detail and negative for DM, CAD, Cancer, CVA. Positive as follows:        Problem Relation Age of Onset    Heart Disease Mother     Cancer Father         lymphoma    Diabetes Father        REVIEW OF SYSTEMS COMPLETED:   Pertinent positives as noted in the HPI. All other systems reviewed and negative. PHYSICAL EXAM PERFORMED:  BP (!) 176/71   Pulse 58   Temp 98.3 °F (36.8 °C) (Oral)   Resp 16   Ht 5' 4\" (1.626 m)   Wt 162 lb (73.5 kg)   SpO2 90%   BMI 27.81 kg/m²   General appearance: No apparent distress, appears stated age and cooperative. HEENT: Normal cephalic, atraumatic without obvious deformity. Pupils equal, round, and reactive to light. Extra ocular muscles intact. Conjunctivae/corneas clear. Neck: Supple, with full range of motion. No jugular venous distention. Trachea midline. Respiratory:  Normal respiratory effort. Clear to auscultation, bilaterally without Rales/Wheezes/Rhonchi. Cardiovascular: Regular rate and rhythm with normal S1/S2 without murmurs, rubs or gallops.   Abdomen: Postoperative dressing C/D/I; decreased bowel sounds  Musculoskeletal: F ROM without deformity  Skin: Skin color, texture, turgor normal.  No rashes or lesions. Neurologic:  Neurovascularly intact without any focal sensory/motor deficits. Cranial nerves: II-XII intact, grossly non-focal.  Psychiatric: Alert and oriented, thought content appropriate, normal insight  Capillary Refill: Brisk,3 seconds, normal   Peripheral Pulses: +2 palpable, equal bilaterally     Labs:     No results for input(s): WBC, HGB, HCT, PLT in the last 72 hours. No results for input(s): NA, K, CL, CO2, BUN, CREATININE, CALCIUM, PHOS in the last 72 hours. Invalid input(s): MAGNES  No results for input(s): AST, ALT, BILIDIR, BILITOT, ALKPHOS in the last 72 hours. No results for input(s): INR in the last 72 hours. No results for input(s): Gwendolyn Rowels in the last 72 hours.     Urinalysis:    Lab Results   Component Value Date/Time    NITRU Negative 03/06/2022 05:30 PM    WBCUA 0-2 03/06/2022 05:30 PM    BACTERIA Rare 03/06/2022 05:30 PM    RBCUA None seen 03/06/2022 05:30 PM    BLOODU Negative 03/06/2022 05:30 PM    SPECGRAV 1.015 03/06/2022 05:30 PM    GLUCOSEU Negative 03/06/2022 05:30 PM       Radiology: I have reviewed the radiology reports with the following interpretation:     XR CHEST PORTABLE    (Results Pending)          EKG: Pending      ASSESSMENT:    Active Hospital Problems    Diagnosis Date Noted    S/P small bowel resection [Z90.49] 12/07/2022     Priority: High    Mesenteric mass [K63.89] 11/04/2022     Priority: High    History of bladder cancer [Z85.51] 12/07/2022     Priority: Medium    Type 2 diabetes mellitus ([facility] [address] [facility] [E11.9] 04/17/2019     Priority: Medium    Essential hypertension [I10] 02/28/2019     Priority: Medium       PLAN:    Jejunal mass s/p resection  -Postoperative care per general surgical team  -Oxy IR scheduled for pain control  -Zofran available PRN for nausea; EKG ordered to ensure QTC prolongation not present    Type II DM  -A1c ordered with results pending at time of dictation  -Home Glucotrol and metformin doses placed on temporary hold  - Scheduled Q 4 hours POC glucose checks with fasting medium dose SSI Humalog coverage  -Recommend addition of Lantus nightly once diet resumed  -Carbohydrate restriction to be placed on diet once resumed    HTN  -EKG ordered for baseline tracing during inpatient stay  -Continue home dosages of lisinopril and Tenormin  -HCTZ on temporary hold pending results of renal panel ordered by hospitalist consult    Hypothyroidism  -TSH and free T4 pending  -Adjust levothyroxine replacement accordingly     DVT Prophylaxis:BLE SCDs until chemical anticoagulation approved by surgery  Diet: Diet NPO Exceptions are: Sips of Water with Meds, Ice Chips, Popsicles  Code Status: Full Code    PT/OT Eval Status: Active and ongoing      Thank you for the consultation, will follow up as needed    Electronically signed by Christine Nielsen MD on 12/7/22 at 8:03 PM EST

## 2022-12-08 NOTE — CARE COORDINATION
CASE MANAGEMENT INITIAL ASSESSMENT      Reviewed chart and completed assessment with patient:     Family present: none    Explained Case Management role/services. Primary contact information:as below    Health Care Decision Maker :   Primary Decision Maker: nikki amado - Child - [phone]    Secondary Decision Maker: Shawanda Angeles - Child - [phone]    Secondary Decision Maker: Viv Gandara - Child - [phone]          Can this person be reached and be able to respond quickly, such as within a few minutes or hours? Yes       Admit date/status:12/7/22 IPA    Diagnosis:abdominal mass     Is this a Readmission?:  No      Insurance:[employer] Medicare     Precert required for SNF: Yes       3 night stay required: No    Living arrangements, Adls, care needs, prior to admission:IPTA. Mows her own grass! Son Kalie Herrmann lives next door and daughter Nellie Morgan lives 2 streets away. One story home. Daughter Alan Courtney (RN) lives in [address] [place] [address], but is here and palanning on staying with Rebekah Guevara for a week at discharge. Durable Medical Equipment at home:  Walker__Cane__RTS__ BSC__Shower Chair__  02__ HHN__ CPAP__  BiPap__  Hospital Bed__ W/C___ Other__none___    Services in the home and/or outpatient, prior to admission:none    Current Kodak Magallon MD          Medications:  Prescription coverage? Yes Will pt require financial assistance with medications  No     Transportation needs: active .  Family can transport home      PT/OT recs:not ordered    Hospital Exemption Notification (HEN):needed if SNF - unlikely    Barriers to discharge:none    Plan/comments:Plans dc home with family to assist as needed     ECOC on chart for MD signature    Kirsty Morgan RN

## 2022-12-08 NOTE — OP NOTE
[address], [place]                                OPERATIVE REPORT    PATIENT NAME: Ori Mathews                 :        1940  MED REC NO:   [id number]                          ROOM:       0335  ACCOUNT NO:   [de-identified]                           ADMIT DATE: 2022  PROVIDER:     Demetrius Santiago MD    DATE OF PROCEDURE:  2022    PREOPERATIVE DIAGNOSIS:  Mesenteric lesion of proximal small bowel. POSTOPERATIVE DIAGNOSIS:  Mesenteric lesion of proximal small bowel. PROCEDURE PERFORMED:  Robotic-assisted small bowel and mesentery  resection. SURGEON:  Demetrius Santiago MD    ANESTHESIA:  General.    ESTIMATED BLOOD LOSS:  Less than 100 mL. SPECIMENS:  Segment of jejunum with underlying mesentery, stitch equals  proximal margin. COUNT:  Sponge and needle counts were correct. INDICATIONS:  The patient is an 68-year-old female who recently was  known to have an abnormal appearance of the mesentery underlying the  proximal portion of her jejunum. This has been seen on sequential CT  scans over the past year and seemed to be slowly enlarging in dimension. A workup for tumor markers was unremarkable prior to this. Due to  concerning appearance despite having no current obvious symptoms, I had  recommended a resection of the area to identify whether malignancy was  present or not. FINDINGS:  1. Focal area of proximal jejunum with underlying mesenteric  thickening/mass causing swirling of the overlying bowel loops. 2.  Robotic-assisted jejunal resection with underlying mesenteric  resection, with a side-to-side stapled jejunal anastomosis. DESCRIPTION OF PROCEDURE:  After informed consent was obtained, the  patient was taken to the operating room and placed in the supine  position. Preoperative antibiotics have been initiated in the holding  area. Athrombic pumps were placed in the legs. General anesthesia was  administered without difficulty. The abdomen was prepped and draped in  a sterile fashion. I began with a trocar incision in the right upper  quadrant. After infiltrating with local anesthesia, the trocar was  guided into the abdominal cavity and insufflation was initiated. There  were some omental adhesions to the lower central abdominal wall from a  prior umbilical hernia repair. An additional trocar was placed in the  right lower quadrant followed by ultimately another trocar in the left  lower quadrant. We were able to takedown the omental adhesions to clear  a room for our procedure. Lifting up the transverse colon and  retracting it towards the head, I could see the proximal bowel loop. There was a clearly abnormal area of small bowel loop that seemed to be  coiled around the thickened area of mesentery underneath. The small  bowel was grossly normal in appearance above this thickened mesentery. The bowel proximal and distal to this area was grossly normal as well. I elected at this point to perform a resection of the bowel loops with  the underlying mesentery. A window was made in the mesentery proximal and distal to the area of  the mass and then the robotic linear stapler was used to transect the  bowel. We now used a vessel sealing device to lift up the segment of  small bowel and began resecting the underlying mesentery that was  abnormal.  At the base of this area of the mesentery, there was an  arterial vessel that did have significant bleeding and this ultimately  was controlled with some sutures placed robotically. I then completed  the mesenteric dissection in order to completely detach the specimen. I then aligned the two limbs of the jejunum in a standard orientation  and secured them with silk sutures. Enterotomies were then made at the  ends of both limbs of the bowel and a linear stapler was used to create  a side-to-side anastomosis.   A 3-0 barbed suture was then used to close  the common enterotomy in two layers, first with a full thickness layer  followed by a seromuscular layer over the top of the initial layer. The  mesenteric defect underneath the anastomosis was approximated with  interrupted silk sutures. At this point, I was satisfied with the  resection and the anastomosis. The robot was undocked. Moving to the bedside, I made a small  supraumbilical midline incision approximately 4 cm in length and carried  it down through the abdominal wall layers. A wound protector was placed  across the incision and the specimen was extracted without difficulty  and passed off. A stitch was placed on the proximal staple line for  orientation. The midline fascial defect was then closed with a running  0 PDS suture. The 12 mm defect in the left lower quadrant and right  upper quadrant were both closed with figure-of-eight 0 Vicryl sutures. The skin incisions were all closed with 4-0 Monocryl subcuticular  sutures and Dermabond. The patient was then extubated and taken to the  recovery room in stable condition.         Delfina Vasques MD    D: 12/07/2022 23:28:45       T: 12/08/2022 2:41:26     [id number]  Job#: [id number]     Doc#: [id number]    CC:  Iliana Peterson MD

## 2022-12-08 NOTE — DISCHARGE INSTR - COC
Continuity of Care Form    Patient Name: Anika Finch   :  1940  MRN:  [id number]    Admit date:  2022  Discharge date:  ***    Code Status Order: Full Code   Advance Directives:   Advance Care Flowsheet Documentation       Date/Time Healthcare Directive Type of Healthcare Directive Copy in [address] Agent's Name Healthcare Agent's Phone Number    [phone] Yes, patient has an advance directive for healthcare treatment Durable power of  for health care;Living will No, copy requested from family Adult [address] [phone]            Admitting Physician:  Sandy Williamson MD  PCP: Karyna Sy MD    Discharging Nurse: [facility] Unit/Room#: 3318/1855-37  Discharging Unit Phone Number: ***    Emergency Contact:   Extended Emergency Contact Information  Primary Emergency Contact: [place] Phone: [phone]  Mobile Phone: [phone]  Relation: Child  Secondary Emergency Contact: aneudynikki  [place] Phone: [phone]  Mobile Phone: [phone]  Relation: Child    Past Surgical History:  Past Surgical History:   Procedure Laterality Date    CHOLECYSTECTOMY      COLONOSCOPY  2002    normal per pt    COLONOSCOPY  2016    normal    CYSTOSCOPY      bladder ca burned off  multiple procedures    ENDOSCOPY, COLON, DIAGNOSTIC      HYSTERECTOMY (CERVIX STATUS UNKNOWN)      OVARY REMOVAL         Immunization History: There is no immunization history for the selected administration types on file for this patient.     Active Problems:  Patient Active Problem List   Diagnosis Code    Abnormal EKG R94.31    Essential hypertension I10    Mixed hyperlipidemia E78.2    Type 2 diabetes mellitus (HCC) E11.9    Left ankle swelling, not cardiac in nature M25.472    Anemia D64.9    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    Preoperative clearance Z01.818    Abdominal mass R19.00    S/P small bowel resection Z90.49    History of bladder cancer Z85.51       Isolation/Infection:   Isolation            No Isolation          Patient Infection Status       None to display            Nurse Assessment:  Last Vital Signs: BP (!) 143/60   Pulse 50   Temp 97.7 °F (36.5 °C) (Oral)   Resp 16   Ht 5' 4\" (1.626 m)   Wt 162 lb (73.5 kg)   SpO2 95%   BMI 27.81 kg/m²     Last documented pain score (0-10 scale): Pain Level: 4  Last Weight:   Wt Readings from Last 1 Encounters:   22 162 lb (73.5 kg)     Mental Status:  {IP PT MENTAL STATUS:}    IV Access:  { CHRISTOPHER IV ACCESS:633741059}    Nursing Mobility/ADLs:  Walking   {[facility] DME SHCX:110451367}  Transfer  {[facility] DME SNKK:189517650}  Bathing  {[facility] DME ATIE:599832462}  Dressing  {[facility] DME JKQV:873642929}  Toileting  {[facility] DME TNQC:750666162}  Feeding  {[facility] DME BJG}  Med Admin  {[facility] DME SQOF:582576922}  Med Delivery   { CHRISTOPHER MED Delivery:364268802}    Wound Care Documentation and Therapy:  Incision 22 Abdomen Medial (Active)   Dressing Status Intact 22   Dressing/Treatment Skin glue; Open to air 22   Closure Open to air;Surgical glue 22   Margins Approximated 22   Incision Assessment Non-blanchable erythema 22 1600   Drainage Amount None 22   Number of days: 1        Elimination:  Continence: Bowel: {YES / SQ:44515}  Bladder: {YES / WQ:23168}  Urinary Catheter: {Urinary Catheter:544833601}   Colostomy/Ileostomy/Ileal Conduit: {YES / LD:77344}       Date of Last BM: ***    Intake/Output Summary (Last 24 hours) at 2022 0930  Last data filed at 2022 0817  Gross per 24 hour   Intake 1702.57 ml   Output --   Net 1702.57 ml     I/O last 3 completed shifts:   In: 1692.6 [I.V.:1692.6]  Out: -     Safety Concerns:     Chris Arias CHRISTOPHER Safety Concerns:800267803}    Impairments/Disabilities:      [address] Impairments/Disabilities:303111812}    Nutrition Therapy:  Current Nutrition Therapy:   [address] Diet List:241990735}    Routes of Feeding: {CHP DME Other Feedings:104569263}  Liquids: {Slp liquid thickness:83054}  Daily Fluid Restriction: {CHP DME Yes amt example:015219324}  Last Modified Barium Swallow with Video (Video Swallowing Test): {Done Not Done BAIL:666053486}    Treatments at the Time of Hospital Discharge:   Respiratory Treatments: ***  Oxygen Therapy:  {Therapy; copd oxygen:92033}  Ventilator:    { CC Vent OSDP:226062152}    Rehab Therapies: {THERAPEUTIC INTERVENTION:1307047021}  Weight Bearing Status/Restrictions: { CC Weight Bearin}  Other Medical Equipment (for information only, NOT a DME order):  {EQUIPMENT:043229081}  Other Treatments: ***    Patient's personal belongings (please select all that are sent with patient):  {CHP DME Belongings:660299256}    RN SIGNATURE:  {Esignature:024319140}    CASE MANAGEMENT/SOCIAL WORK SECTION    Inpatient Status Date: 22    Readmission Risk Assessment Score:  Readmission Risk              Risk of Unplanned Readmission:  13           Discharging to Facility/ Agency   Name:   Address:  Phone:  Fax:     / signature: {Esignature:684663137}    PHYSICIAN SECTION    Prognosis: {Prognosis:9231610357}    Condition at Discharge: [facility] Patient Condition:107860326}    Rehab Potential (if transferring to Rehab): {Prognosis:2853715514}    Recommended Labs or Other Treatments After Discharge: ***    Physician Certification: I certify the above information and transfer of Sameera Tejeda  is necessary for the continuing treatment of the diagnosis listed and that she requires {Admit to Appropriate Level of Care:30797} for {GREATER/LESS:423347251} 30 days.      Update Admission H&P: {CHP DME Changes in EOPUU:297495744}  Recommended Follow-up, Labs or Other Treatments After Discharge:    ***            PHYSICIAN SIGNATURE:  {Esignature:454427332}

## 2022-12-08 NOTE — PROGRESS NOTES
Hospitalist Progress Note      PCP: Karyna Sy MD    Date of Admission: 12/7/2022    Chief Complaint: jejunal mass    Hospital Course:    [de-identified] y.o. female who we are asked to see/evaluate by Sandy Williamson MD for postoperative medical management of this individual who recently underwent small bowel resection for jejunal mass. Hospitalist consulted to manage chronic comorbid conditions including HTN, type II DM, and hypothyroidism      Subjective:      Patient was seen sitting up in chair. Reports mild abdominal pain otherwise denies any nausea vomiting or shortness of breath.   On room air     Medications:  Reviewed    Infusion Medications    sodium chloride      [Held by provider] sodium chloride 125 mL/hr at 12/08/22 0642    dextrose      dextrose       Scheduled Medications    lisinopril  40 mg Oral Daily    indocyanine green  5 mg IntraVENous Once    atenolol  75 mg Oral Daily    latanoprost  1 drop Both Eyes Nightly    Latanoprostene Bunod  1 drop Ophthalmic Nightly    montelukast  10 mg Oral Nightly    sodium chloride flush  5-40 mL IntraVENous 2 times per day    famotidine  20 mg Oral Daily    Or    famotidine (PEPCID) injection  20 mg IntraVENous Daily    insulin lispro  0-8 Units SubCUTAneous Q4H     PRN Meds: benzocaine-menthol, sodium chloride flush, sodium chloride, ondansetron **OR** ondansetron, acetaminophen, oxyCODONE **OR** oxyCODONE, morphine **OR** morphine, aluminum & magnesium hydroxide-simethicone, glucose, dextrose bolus **OR** dextrose bolus, glucagon (rDNA), dextrose, glucose, dextrose bolus **OR** dextrose bolus, glucagon (rDNA), dextrose      Intake/Output Summary (Last 24 hours) at 12/8/2022 1008  Last data filed at 12/8/2022 0817  Gross per 24 hour   Intake 1702.57 ml   Output --   Net 1702.57 ml       Physical Exam Performed:    BP (!) 143/60   Pulse 50   Temp 97.7 °F (36.5 °C) (Oral)   Resp 16   Ht 5' 4\" (1.626 m)   Wt 162 lb (73.5 kg)   SpO2 95%   BMI 27.81 kg/m²     General appearance: No apparent distress, appears stated age and cooperative. HEENT: Pupils equal, round, and reactive to light. Conjunctivae/corneas clear. Neck: Supple, with full range of motion. No jugular venous distention. Trachea midline. Respiratory:  Normal respiratory effort. Bibasilar Rales with no overt crackles or  wheezing   Cardiovascular: Bradycardic regular rate  rhythm with normal S1/S2 without murmurs, rubs or gallops. Abdomen: Soft, non-tender, non-distended with normal bowel sounds. Musculoskeletal: No clubbing, cyanosis or edema bilaterally. Skin: Warm and dry  Neurologic: Alert, speech clear with no overt facial droop  Psychiatric: Alert and oriented, thought content appropriate, normal insight        Labs:   Recent Labs     12/08/22  0609   WBC 13.1*   HGB 9.9*   HCT 31.4*        Recent Labs     12/08/22  0609      K 3.8      CO2 28   BUN 19   CREATININE 0.8   CALCIUM 8.6     No results for input(s): AST, ALT, BILIDIR, BILITOT, ALKPHOS in the last 72 hours. No results for input(s): INR in the last 72 hours. No results for input(s): [place] in the last 72 hours. Urinalysis:      Lab Results   Component Value Date/Time    NITRU Negative 03/06/2022 05:30 PM    WBCUA 0-2 03/06/2022 05:30 PM    BACTERIA Rare 03/06/2022 05:30 PM    RBCUA None seen 03/06/2022 05:30 PM    BLOODU Negative 03/06/2022 05:30 PM    SPECGRAV 1.015 03/06/2022 05:30 PM    GLUCOSEU Negative 03/06/2022 05:30 PM       Radiology:  XR CHEST PORTABLE   Final Result   Mild bilateral pulmonary vascular congestion and trace bilateral pleural   effusions.              IP CONSULT TO HOSPITALIST    Assessment/Plan:    Active Hospital Problems    Diagnosis     S/P small bowel resection [Z90.49]      Priority: Medium    History of bladder cancer [Z85.51]      Priority: Medium    Mesenteric mass [K63.89]      Priority: Medium    Type 2 diabetes mellitus ([facility] [address] [E11.9]     Essential hypertension [I10]        Jejunal mass s/p resection on 12/7  -Postoperative care per general surgical team     Type II DM: Oral meds on hold. Continue SSI           HTN: not controlled. Resume home of lisinopril, HCTZ. Hold atenolol if HR <50. Sinus bradycardia : chronic and asymptomatic. Pt states  HR in 50's at baseline. EKG showed sinus bradycardia which is chronic with  no significant AV block. Patient states that her atenolol was reduced during her previous hospitalization to 50mg daily however saw her cardiologist last week and was increased to 75 mg daily. Hold atenolol if HR <50 and can decrease med dose as needed. Monitor on tele        Hypothyroidism  - TSH 3.4 on 11/25/22. continue Synthroid        CXR showed mild pulm edema but  pt denies any SOB at time of eval   and was on RA. Pt also  NPO. Will hold IVF. Likely does not need IV lasix as asymptomatic. Resume home HCTZ. Leucocytosis: likely due to postsurgical stress response.        DVT Prophylaxis: lovenox       Diet: Diet NPO Exceptions are: Sips of Water with Meds, Ice Chips, Popsicles  Code Status: Full Code      Dispo -per  John Rodas MD

## 2022-12-08 NOTE — PLAN OF CARE
Verbalizes adequate pain relief with meds as ordered. Bed locked and in low position, bedside table and call light within reach, nonskid socks and bed alarm on. Up to BR with contact guard assist, somewhat unsteady.   Problem: Pain  Goal: Verbalizes/displays adequate comfort level or baseline comfort level  Outcome: Progressing     Problem: Safety - Adult  Goal: Free from fall injury  Outcome: Progressing

## 2022-12-08 NOTE — ACP (ADVANCE CARE PLANNING)
Advance Care Planning   Advance Care Planning Clinical Specialist  Conversation Note      Date of ACP Conversation: 12/8/2022    Conversation Conducted with:   Patient with [address]: Named in Advance Directive or Healthcare Power of  celi Siu Specialist: Kathy Soto RN      *When Decision Maker makes decisions on behalf of the incapacitated patient: Marcial Cota is asked to consider and make decisions based on patient values, known preferences, or best interests. Current Designated Health Care Decision Maker:   Primary Decision Maker: kalli amadoed - Child - [phone]    Secondary Decision Maker: Nora Engle - Child - [phone]    Secondary Decision Maker: Lesley Nithin - Child - [phone]  (as entered in [address]  [place]. Validate  this information as still accurate & up-to-date; edit [place] field as needed.)  Has AD's  - family will bring in copy     Hospitalization  If your health were to worsen and it became clear that your chance of recovery was unlikely, what would your preferences be regarding hospitalization?:    Choice:  [x]  The patient would want hospitalization  []  The patient would prefer comfort-focused treatment without hospitalization. Ventilation  If you were in your present state of health and suddenly became very ill and were unable to breathe on your own, what would your preference be about the use of a ventilator (breathing machine) if it were available to you? If patient would desire the use of a ventilator (breathing machine), answer \"yes\", if not \"no\":yes    If your health were to worsen and it became clear that your chance of recovery was unlikely, would that change your answer? Yes    Resuscitation  CPR works best to restart the heart when there is a sudden event, like a heart attack, in someone who is otherwise healthy.  Unfortunately, CPR does not typically restart the heart for people who have serious health conditions or who are very sick. In the event your heart stopped, would you want attempts to restart your heart (answer \"yes\") or would you prefer a natural death (answer \"no\")? yes    If your health were to worsen and it became clear that your chance of recovery was unlikely, would that change your answer? Yes    [x] Yes  [] No   Educated Patient / Roberth Duty regarding differences between Advance Directives and portable DNR orders.     Length of ACP Conversation in minutes:  20 minutes    Conversation Outcomes:  [x] ACP discussion completed  [] Existing advance directive reviewed with patient; no changes to patient's previously recorded wishes   [] New Advance Directive completed   [] Portable Do Not Rescitate prepared for Provider review and signature  [] POLST/POST/MOLST/MOST prepared for Provider review and signature      Follow-up plan:    [] Schedule follow-up conversation to continue planning  [] Referred individual to Provider for additional questions/concerns   [] Advised patient/agent/surrogate to review completed ACP document and update if needed with changes in condition, patient preferences or care setting     [] This note routed to one or more involved healthcare providers        Efrain Siegel RN  ACP Clinical Specialist

## 2022-12-09 ENCOUNTER — APPOINTMENT (OUTPATIENT)
Dept: GENERAL RADIOLOGY | Age: 82
DRG: 331 | End: 2022-12-09
Attending: SURGERY
Payer: MEDICARE

## 2022-12-09 LAB
ANION GAP SERPL CALCULATED.3IONS-SCNC: 6 MMOL/L (ref 3–16)
BASOPHILS ABSOLUTE: 0.1 K/UL (ref 0–0.2)
BASOPHILS RELATIVE PERCENT: 0.6 %
BUN BLDV-MCNC: 11 MG/DL (ref 7–20)
CALCIUM SERPL-MCNC: 9.2 MG/DL (ref 8.3–10.6)
CHLORIDE BLD-SCNC: 100 MMOL/L (ref 99–110)
CO2: 31 MMOL/L (ref 21–32)
CREAT SERPL-MCNC: 0.7 MG/DL (ref 0.6–1.2)
EOSINOPHILS ABSOLUTE: 0.4 K/UL (ref 0–0.6)
EOSINOPHILS RELATIVE PERCENT: 3.7 %
ESTIMATED AVERAGE GLUCOSE: 102.5 MG/DL
GFR SERPL CREATININE-BSD FRML MDRD: >60 ML/MIN/{1.73_M2}
GLUCOSE BLD-MCNC: 104 MG/DL (ref 70–99)
GLUCOSE BLD-MCNC: 108 MG/DL (ref 70–99)
GLUCOSE BLD-MCNC: 114 MG/DL (ref 70–99)
GLUCOSE BLD-MCNC: 121 MG/DL (ref 70–99)
GLUCOSE BLD-MCNC: 131 MG/DL (ref 70–99)
GLUCOSE BLD-MCNC: 134 MG/DL (ref 70–99)
HBA1C MFR BLD: 5.2 %
HCT VFR BLD CALC: 32.3 % (ref 36–48)
HEMOGLOBIN: 10.3 G/DL (ref 12–16)
LYMPHOCYTES ABSOLUTE: 0.9 K/UL (ref 1–5.1)
LYMPHOCYTES RELATIVE PERCENT: 8.1 %
MCH RBC QN AUTO: 25.4 PG (ref 26–34)
MCHC RBC AUTO-ENTMCNC: 31.8 G/DL (ref 31–36)
MCV RBC AUTO: 79.7 FL (ref 80–100)
MONOCYTES ABSOLUTE: 0.9 K/UL (ref 0–1.3)
MONOCYTES RELATIVE PERCENT: 8.6 %
NEUTROPHILS ABSOLUTE: 8.3 K/UL (ref 1.7–7.7)
NEUTROPHILS RELATIVE PERCENT: 79 %
PDW BLD-RTO: 28.6 % (ref 12.4–15.4)
PERFORMED ON: ABNORMAL
PLATELET # BLD: 339 K/UL (ref 135–450)
PLATELET SLIDE REVIEW: ADEQUATE
PMV BLD AUTO: 8 FL (ref 5–10.5)
POTASSIUM SERPL-SCNC: 3.5 MMOL/L (ref 3.5–5.1)
RBC # BLD: 4.05 M/UL (ref 4–5.2)
SLIDE REVIEW: ABNORMAL
SODIUM BLD-SCNC: 137 MMOL/L (ref 136–145)
WBC # BLD: 10.5 K/UL (ref 4–11)

## 2022-12-09 PROCEDURE — 36415 COLL VENOUS BLD VENIPUNCTURE: CPT

## 2022-12-09 PROCEDURE — 1200000000 HC SEMI PRIVATE

## 2022-12-09 PROCEDURE — 74019 RADEX ABDOMEN 2 VIEWS: CPT

## 2022-12-09 PROCEDURE — 6370000000 HC RX 637 (ALT 250 FOR IP): Performed by: SURGERY

## 2022-12-09 PROCEDURE — 99024 POSTOP FOLLOW-UP VISIT: CPT | Performed by: SURGERY

## 2022-12-09 PROCEDURE — 2580000003 HC RX 258: Performed by: SURGERY

## 2022-12-09 PROCEDURE — 6370000000 HC RX 637 (ALT 250 FOR IP): Performed by: INTERNAL MEDICINE

## 2022-12-09 PROCEDURE — 2580000003 HC RX 258: Performed by: INTERNAL MEDICINE

## 2022-12-09 PROCEDURE — 85025 COMPLETE CBC W/AUTO DIFF WBC: CPT

## 2022-12-09 PROCEDURE — 80048 BASIC METABOLIC PNL TOTAL CA: CPT

## 2022-12-09 PROCEDURE — 6360000002 HC RX W HCPCS: Performed by: NURSE PRACTITIONER

## 2022-12-09 PROCEDURE — APPSS30 APP SPLIT SHARED TIME 16-30 MINUTES: Performed by: CLINICAL NURSE SPECIALIST

## 2022-12-09 RX ORDER — SODIUM CHLORIDE 9 MG/ML
INJECTION, SOLUTION INTRAVENOUS CONTINUOUS
Status: DISCONTINUED | OUTPATIENT
Start: 2022-12-09 | End: 2022-12-11 | Stop reason: HOSPADM

## 2022-12-09 RX ORDER — HYDRALAZINE HYDROCHLORIDE 20 MG/ML
10 INJECTION INTRAMUSCULAR; INTRAVENOUS EVERY 6 HOURS PRN
Status: DISCONTINUED | OUTPATIENT
Start: 2022-12-09 | End: 2022-12-11 | Stop reason: HOSPADM

## 2022-12-09 RX ADMIN — FAMOTIDINE 20 MG: 20 TABLET ORAL at 09:23

## 2022-12-09 RX ADMIN — HYDRALAZINE HYDROCHLORIDE 10 MG: 20 INJECTION INTRAMUSCULAR; INTRAVENOUS at 22:59

## 2022-12-09 RX ADMIN — SODIUM CHLORIDE: 9 INJECTION, SOLUTION INTRAVENOUS at 16:42

## 2022-12-09 RX ADMIN — ATENOLOL 75 MG: 25 TABLET ORAL at 09:23

## 2022-12-09 RX ADMIN — HYDROCHLOROTHIAZIDE 25 MG: 25 TABLET ORAL at 09:23

## 2022-12-09 RX ADMIN — LISINOPRIL 40 MG: 20 TABLET ORAL at 09:22

## 2022-12-09 RX ADMIN — ALUMINUM HYDROXIDE, MAGNESIUM HYDROXIDE, AND SIMETHICONE 15 ML: 200; 200; 20 SUSPENSION ORAL at 21:36

## 2022-12-09 RX ADMIN — SODIUM CHLORIDE, PRESERVATIVE FREE 10 ML: 5 INJECTION INTRAVENOUS at 09:30

## 2022-12-09 RX ADMIN — SODIUM CHLORIDE, PRESERVATIVE FREE 10 ML: 5 INJECTION INTRAVENOUS at 21:59

## 2022-12-09 RX ADMIN — MONTELUKAST SODIUM 10 MG: 10 TABLET, COATED ORAL at 21:36

## 2022-12-09 ASSESSMENT — PAIN SCALES - GENERAL
PAINLEVEL_OUTOF10: 0
PAINLEVEL_OUTOF10: 1
PAINLEVEL_OUTOF10: 3

## 2022-12-09 ASSESSMENT — PAIN DESCRIPTION - PAIN TYPE: TYPE: SURGICAL PAIN

## 2022-12-09 ASSESSMENT — PAIN DESCRIPTION - FREQUENCY: FREQUENCY: INTERMITTENT

## 2022-12-09 ASSESSMENT — PAIN DESCRIPTION - LOCATION
LOCATION: ABDOMEN
LOCATION: ABDOMEN

## 2022-12-09 ASSESSMENT — PAIN DESCRIPTION - DESCRIPTORS: DESCRIPTORS: SORE

## 2022-12-09 ASSESSMENT — PAIN DESCRIPTION - ORIENTATION: ORIENTATION: OUTER

## 2022-12-09 NOTE — PROGRESS NOTES
Pt requested pain medication for abdominal pain, prn given with positive effect noted. Pt tolerated all mediations good, hob of elevated and call light with in reach, incisons are regina, no drainage noted.

## 2022-12-09 NOTE — CARE COORDINATION
Now on Clear liquids. Likely dc home 1 -2 days. Lives alone but family will assist - daughter planning on staying with her for at least one week.       Leti Carrero RN

## 2022-12-09 NOTE — PLAN OF CARE
Problem: Chronic Conditions and Co-morbidities  Goal: Patient's chronic conditions and co-morbidity symptoms are monitored and maintained or improved  12/9/2022 0244 by Tita Moreira RN  Outcome: Progressing  12/8/2022 2335 by Tita Moreira RN  Outcome: Progressing     Problem: Discharge Planning  Goal: Discharge to home or other facility with appropriate resources  12/9/2022 0244 by Tita Moreira RN  Outcome: Progressing  12/8/2022 2335 by Tita Moreira RN  Outcome: Progressing     Problem: Pain  Goal: Verbalizes/displays adequate comfort level or baseline comfort level  12/9/2022 0244 by Tita Moreira RN  Outcome: Progressing  12/8/2022 2335 by Tita Moreira RN  Outcome: Progressing     Problem: Safety - Adult  Goal: Free from fall injury  12/9/2022 0244 by Tita Moreira RN  Outcome: Progressing  12/8/2022 2335 by Tita Moreira RN  Outcome: Progressing     Problem: Neurosensory - Adult  Goal: Achieves stable or improved neurological status  12/9/2022 0244 by Tita Moreira RN  Outcome: Progressing  12/8/2022 2335 by Tita Moreira RN  Outcome: Progressing  Goal: Achieves maximal functionality and self care  12/9/2022 0244 by Tita Moreira RN  Outcome: Progressing  12/8/2022 2335 by Tita Moreira RN  Outcome: Progressing     Problem: Gastrointestinal - Adult  Goal: Minimal or absence of nausea and vomiting  12/9/2022 0244 by Tita Moreira RN  Outcome: Progressing  12/8/2022 2335 by Tita Moreira RN  Outcome: Progressing  Goal: Maintains or returns to baseline bowel function  12/9/2022 0244 by Tita Moreira RN  Outcome: Progressing  12/8/2022 2335 by Tita Moreira RN  Outcome: Progressing  Goal: Maintains adequate nutritional intake  12/9/2022 0244 by Tita Moreira RN  Outcome: Progressing  12/8/2022 2335 by Tita Moreira RN  Outcome: Progressing

## 2022-12-09 NOTE — PROGRESS NOTES
Pt a/o. Pt stated no pain, no nausea. Pt stated just discomfort and bloating. No emesis. No prn medication needed per pt. Secure message sent to Dr. Regina Sommer \"Pt on small amounts of clears, not taking much in today. Bowel rest per surgery with no NG at this time. Surgery stated to defer to you if you want to start gentle fluids. \" See new orders. Pt uses call light light appropriately to call out for assistance. Bed locked in lowest position; side rails 2/4; call light and bedside table within reach of pt.

## 2022-12-09 NOTE — PROGRESS NOTES
[facility] GENERAL SURGERY    Surgery Progress Note           POD # 2  PATIENT NAME: Sharlet Bamberger     TODAY'S DATE: 12/9/2022    INTERVAL HISTORY:    Pt reports having increased abd bloating today and not feeling as well as yesterday. No flatus yet. Taking small amt of liquids     OBJECTIVE:   VITALS:  BP (!) 147/60   Pulse 60   Temp 98.6 °F (37 °C) (Oral)   Resp 17   Ht 5' 4\" (1.626 m)   Wt 162 lb (73.5 kg)   SpO2 90%   BMI 27.81 kg/m²     INTAKE/OUTPUT:    I/O last 3 completed shifts: In: 2462.6 [P.O.:770; I.V.:1692.6]  Out: 250 [Urine:250]  I/O this shift:  In: 237 [P.O.:237]  Out: -               CONSTITUTIONAL:  awake and alert  LUNGS:  no crackles or wheezing  ABDOMEN:   hypoactive bowel sounds, soft, mod distended, mildly tender   INCISION: clean, dry, no drainage    Data:  CBC:   Recent Labs     12/08/22  0609   WBC 13.1*   HGB 9.9*   HCT 31.4*          BMP:    Recent Labs     12/08/22  0609      K 3.8      CO2 28   BUN 19   CREATININE 0.8   GLUCOSE 123*           ASSESSMENT AND PLAN:  [de-identified] y.o. female status post Robotic-assisted small bowel and mesentery  resection. GI: AXR this AM, pt may need ngt if shows sig distention. Sips of clears for now  CVD: IM managing  Activity: ambulate with assistance. Dispo: likely 2-3 days     Electronically signed by OFELIA Taylor - CNP     Surgery Staff    I have examined this patient, and read and agree with the note by Dakotah Bautista CNP from today; more than half of the total time was spent by me on the encounter. May be developing post-operative ileus - will check AXR; if confirmed, will place NGT for decompression until bowel function resumes.     Monik Banks MD

## 2022-12-09 NOTE — PLAN OF CARE
Problem: Chronic Conditions and Co-morbidities  Goal: Patient's chronic conditions and co-morbidity symptoms are monitored and maintained or improved  Outcome: Progressing     Problem: Discharge Planning  Goal: Discharge to home or other facility with appropriate resources  Outcome: Progressing     Problem: Pain  Goal: Verbalizes/displays adequate comfort level or baseline comfort level  Outcome: Progressing     Problem: Safety - Adult  Goal: Free from fall injury  Outcome: Progressing     Problem: Neurosensory - Adult  Goal: Achieves stable or improved neurological status  Outcome: Progressing  Goal: Achieves maximal functionality and self care  Outcome: Progressing     Problem: Gastrointestinal - Adult  Goal: Minimal or absence of nausea and vomiting  Outcome: Progressing  Goal: Maintains or returns to baseline bowel function  Outcome: Progressing  Goal: Maintains adequate nutritional intake  Outcome: Progressing

## 2022-12-09 NOTE — PROGRESS NOTES
Hospitalist Progress Note      PCP: Keeley Fine MD    Date of Admission: 12/7/2022    Chief Complaint: jejunal mass    Hospital Course:    [de-identified] y.o. 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 Hospitalist consulted to manage chronic comorbid conditions including HTN, type II DM, and hypothyroidism      Subjective:      Patient complains of abdominal bloating with abd pain . Not passing flatus.  No BM      Medications:  Reviewed    Infusion Medications    sodium chloride      [Held by provider] sodium chloride 125 mL/hr at 12/08/22 0642    dextrose      dextrose       Scheduled Medications    lisinopril  40 mg Oral Daily    hydroCHLOROthiazide  25 mg Oral Daily    indocyanine green  5 mg IntraVENous Once    atenolol  75 mg Oral Daily    latanoprost  1 drop Both Eyes Nightly    Latanoprostene Bunod  1 drop Ophthalmic Nightly    montelukast  10 mg Oral Nightly    sodium chloride flush  5-40 mL IntraVENous 2 times per day    famotidine  20 mg Oral Daily    Or    famotidine (PEPCID) injection  20 mg IntraVENous Daily    insulin lispro  0-8 Units SubCUTAneous Q4H     PRN Meds: benzocaine-menthol, sodium chloride flush, sodium chloride, ondansetron **OR** ondansetron, acetaminophen, oxyCODONE **OR** oxyCODONE, morphine **OR** morphine, aluminum & magnesium hydroxide-simethicone, glucose, dextrose bolus **OR** dextrose bolus, glucagon (rDNA), dextrose, glucose, dextrose bolus **OR** dextrose bolus, glucagon (rDNA), dextrose      Intake/Output Summary (Last 24 hours) at 12/9/2022 1133  Last data filed at 12/9/2022 0958  Gross per 24 hour   Intake 997 ml   Output 250 ml   Net 747 ml         Physical Exam Performed:    BP (!) 147/60   Pulse 60   Temp 98.6 °F (37 °C) (Oral)   Resp 17   Ht 5' 4\" (1.626 m)   Wt 162 lb (73.5 kg)   SpO2 90%   BMI 27.81 kg/m²     General appearance: No apparent distress, appears stated age and cooperative. HEENT: Pupils equal, round, and reactive to light. Conjunctivae/corneas clear. Neck: Supple, with full range of motion. No jugular venous distention. Trachea midline. Respiratory:  Normal respiratory effort. Clear to auscultation bilaterally-no overt wheezing or rhonchi  Cardiovascular: regular rate  rhythm with normal S1/S2 without murmurs, rubs or gallops. Abdomen: Soft, mildly distended with tenderness around laparoscopic incision sites with no rebound tenderness or guarding. Hypoactive bowel sounds  Musculoskeletal: No clubbing, cyanosis or edema bilaterally. Skin: Warm and dry  Neurologic: Alert, speech clear with no overt facial droop  Psychiatric: Alert and oriented, thought content appropriate, normal insight        Labs:   Recent Labs     12/08/22  0609   WBC 13.1*   HGB 9.9*   HCT 31.4*          Recent Labs     12/08/22  0609      K 3.8      CO2 28   BUN 19   CREATININE 0.8   CALCIUM 8.6       No results for input(s): AST, ALT, BILIDIR, BILITOT, ALKPHOS in the last 72 hours. No results for input(s): INR in the last 72 hours. No results for input(s): Randene Holes in the last 72 hours. Urinalysis:      Lab Results   Component Value Date/Time    NITRU Negative 03/06/2022 05:30 PM    WBCUA 0-2 03/06/2022 05:30 PM    BACTERIA Rare 03/06/2022 05:30 PM    RBCUA None seen 03/06/2022 05:30 PM    BLOODU Negative 03/06/2022 05:30 PM    SPECGRAV 1.015 03/06/2022 05:30 PM    GLUCOSEU Negative 03/06/2022 05:30 PM       Radiology:  XR CHEST PORTABLE   Final Result   Mild bilateral pulmonary vascular congestion and trace bilateral pleural   effusions.          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 On clear liquid diet. AXR today      Type II DM: Oral meds on hold. Continue SSI    HTN: Controlled, continue lisinopril, HCTZ, BB. Hold atenolol if HR <50. Sinus bradycardia : chronic and asymptomatic. Pt states  HR in 50's at baseline. EKG showed sinus bradycardia which is chronic with  no significant AV block. Patient states that her atenolol was reduced during her previous hospitalization to 50mg daily however saw her cardiologist last week and was increased to 75 mg daily. Hold atenolol if HR <50 and can decrease med dose as needed. Monitor on tele      Hypothyroidism  - TSH 3.4 on 11/25/22. continue Synthroid    Leucocytosis: likely due to postsurgical stress response. Check CBC     CXR showed mild pulm edema but  pt denies any SOB and was on RA. IVF on hold. Likely does not need IV lasix as asymptomatic. Continue  home HCTZ. DVT Prophylaxis: lovenox       Diet: ADULT DIET; Clear Liquid  Code Status: Full Code      Dispo -per  Gen surg           Ara Peabody, MD       Addendum:    Later called by RN that patient is placed NPO per general surgery. In light of this, seems reasonable to start gentle IVF for hydration with close monitoring of vol status and overt SOB.  Start IVF @50cc/hr

## 2022-12-10 LAB
BASOPHILS ABSOLUTE: 0.1 K/UL (ref 0–0.2)
BASOPHILS RELATIVE PERCENT: 0.8 %
EOSINOPHILS ABSOLUTE: 0.5 K/UL (ref 0–0.6)
EOSINOPHILS RELATIVE PERCENT: 5 %
GLUCOSE BLD-MCNC: 128 MG/DL (ref 70–99)
GLUCOSE BLD-MCNC: 129 MG/DL (ref 70–99)
GLUCOSE BLD-MCNC: 136 MG/DL (ref 70–99)
GLUCOSE BLD-MCNC: 142 MG/DL (ref 70–99)
GLUCOSE BLD-MCNC: 154 MG/DL (ref 70–99)
GLUCOSE BLD-MCNC: 160 MG/DL (ref 70–99)
GLUCOSE BLD-MCNC: 199 MG/DL (ref 70–99)
HCT VFR BLD CALC: 31.9 % (ref 36–48)
HEMOGLOBIN: 10 G/DL (ref 12–16)
LYMPHOCYTES ABSOLUTE: 1.2 K/UL (ref 1–5.1)
LYMPHOCYTES RELATIVE PERCENT: 11.7 %
MCH RBC QN AUTO: 25.1 PG (ref 26–34)
MCHC RBC AUTO-ENTMCNC: 31.3 G/DL (ref 31–36)
MCV RBC AUTO: 80 FL (ref 80–100)
MONOCYTES ABSOLUTE: 0.9 K/UL (ref 0–1.3)
MONOCYTES RELATIVE PERCENT: 9.5 %
NEUTROPHILS ABSOLUTE: 7.2 K/UL (ref 1.7–7.7)
NEUTROPHILS RELATIVE PERCENT: 73 %
PDW BLD-RTO: 28.3 % (ref 12.4–15.4)
PERFORMED ON: ABNORMAL
PLATELET # BLD: 331 K/UL (ref 135–450)
PMV BLD AUTO: 8.5 FL (ref 5–10.5)
RBC # BLD: 3.99 M/UL (ref 4–5.2)
WBC # BLD: 9.9 K/UL (ref 4–11)

## 2022-12-10 PROCEDURE — 1200000000 HC SEMI PRIVATE

## 2022-12-10 PROCEDURE — 6370000000 HC RX 637 (ALT 250 FOR IP): Performed by: SURGERY

## 2022-12-10 PROCEDURE — 85025 COMPLETE CBC W/AUTO DIFF WBC: CPT

## 2022-12-10 PROCEDURE — 2580000003 HC RX 258: Performed by: SURGERY

## 2022-12-10 PROCEDURE — 36415 COLL VENOUS BLD VENIPUNCTURE: CPT

## 2022-12-10 PROCEDURE — 6370000000 HC RX 637 (ALT 250 FOR IP): Performed by: INTERNAL MEDICINE

## 2022-12-10 PROCEDURE — 99024 POSTOP FOLLOW-UP VISIT: CPT | Performed by: SURGERY

## 2022-12-10 PROCEDURE — 2580000003 HC RX 258: Performed by: INTERNAL MEDICINE

## 2022-12-10 RX ORDER — HYDRALAZINE HYDROCHLORIDE 25 MG/1
25 TABLET, FILM COATED ORAL EVERY 8 HOURS SCHEDULED
Status: DISCONTINUED | OUTPATIENT
Start: 2022-12-10 | End: 2022-12-11 | Stop reason: HOSPADM

## 2022-12-10 RX ORDER — HYDRALAZINE HYDROCHLORIDE 25 MG/1
25 TABLET, FILM COATED ORAL EVERY 8 HOURS SCHEDULED
Status: CANCELLED | OUTPATIENT
Start: 2022-12-10

## 2022-12-10 RX ADMIN — SODIUM CHLORIDE, PRESERVATIVE FREE 10 ML: 5 INJECTION INTRAVENOUS at 10:21

## 2022-12-10 RX ADMIN — ALUMINUM HYDROXIDE, MAGNESIUM HYDROXIDE, AND SIMETHICONE 15 ML: 200; 200; 20 SUSPENSION ORAL at 06:30

## 2022-12-10 RX ADMIN — HYDROCHLOROTHIAZIDE 25 MG: 25 TABLET ORAL at 10:26

## 2022-12-10 RX ADMIN — SODIUM CHLORIDE, PRESERVATIVE FREE 10 ML: 5 INJECTION INTRAVENOUS at 21:17

## 2022-12-10 RX ADMIN — ATENOLOL 75 MG: 25 TABLET ORAL at 10:26

## 2022-12-10 RX ADMIN — HYDRALAZINE HYDROCHLORIDE 25 MG: 25 TABLET, FILM COATED ORAL at 14:36

## 2022-12-10 RX ADMIN — MONTELUKAST SODIUM 10 MG: 10 TABLET, COATED ORAL at 21:16

## 2022-12-10 RX ADMIN — SODIUM CHLORIDE: 9 INJECTION, SOLUTION INTRAVENOUS at 14:42

## 2022-12-10 RX ADMIN — FAMOTIDINE 20 MG: 20 TABLET ORAL at 10:26

## 2022-12-10 RX ADMIN — HYDRALAZINE HYDROCHLORIDE 25 MG: 25 TABLET, FILM COATED ORAL at 21:16

## 2022-12-10 RX ADMIN — LISINOPRIL 40 MG: 20 TABLET ORAL at 21:16

## 2022-12-10 ASSESSMENT — PAIN SCALES - GENERAL
PAINLEVEL_OUTOF10: 0
PAINLEVEL_OUTOF10: 0
PAINLEVEL_OUTOF10: 1
PAINLEVEL_OUTOF10: 4

## 2022-12-10 ASSESSMENT — PAIN DESCRIPTION - LOCATION
LOCATION: ABDOMEN
LOCATION: ABDOMEN

## 2022-12-10 ASSESSMENT — PAIN DESCRIPTION - DESCRIPTORS
DESCRIPTORS: SORE
DESCRIPTORS: CRAMPING

## 2022-12-10 NOTE — PROGRESS NOTES
Hospitalist Progress Note      PCP: Soheila Slade MD    Date of Admission: 12/7/2022    Chief Complaint: jejunal mass    Hospital Course:    [de-identified] y.o. female who we are asked to see/evaluate by Diana Delacruz MD for postoperative medical management of this individual who recently underwent small bowel resection for jejunal mass. Hospitalist consulted to manage chronic comorbid conditions including HTN, type II DM, and hypothyroidism      Subjective:      Patient passing gas today and reports minimal abdominal pain.   Denies any nausea, vomiting, SOB     Medications:  Reviewed    Infusion Medications    sodium chloride 50 mL/hr at 12/09/22 1642    sodium chloride      dextrose      dextrose       Scheduled Medications    lisinopril  40 mg Oral Daily    hydroCHLOROthiazide  25 mg Oral Daily    indocyanine green  5 mg IntraVENous Once    atenolol  75 mg Oral Daily    latanoprost  1 drop Both Eyes Nightly    Latanoprostene Bunod  1 drop Ophthalmic Nightly    montelukast  10 mg Oral Nightly    sodium chloride flush  5-40 mL IntraVENous 2 times per day    famotidine  20 mg Oral Daily    Or    famotidine (PEPCID) injection  20 mg IntraVENous Daily    insulin lispro  0-8 Units SubCUTAneous Q4H     PRN Meds: hydrALAZINE, benzocaine-menthol, sodium chloride flush, sodium chloride, ondansetron **OR** ondansetron, acetaminophen, oxyCODONE **OR** oxyCODONE, morphine **OR** morphine, aluminum & magnesium hydroxide-simethicone, glucose, dextrose bolus **OR** dextrose bolus, glucagon (rDNA), dextrose, glucose, dextrose bolus **OR** dextrose bolus, glucagon (rDNA), dextrose      Intake/Output Summary (Last 24 hours) at 12/10/2022 1318  Last data filed at 12/10/2022 1257  Gross per 24 hour   Intake 480 ml   Output 1700 ml   Net -1220 ml         Physical Exam Performed:    BP (!) 152/66   Pulse 56   Temp 98.4 °F (36.9 °C) (Oral)   Resp 16   Ht 5' 4\" (1.626 m)   Wt 162 lb (73.5 kg)   SpO2 96%   BMI 27.81 kg/m² General appearance: No apparent distress, appears stated age and cooperative. HEENT: Pupils equal, round, and reactive to light. Conjunctivae/corneas clear. Neck: Supple, with full range of motion. No jugular venous distention. Trachea midline. Respiratory:  Normal respiratory effort. Clear to auscultation bilaterally-no overt wheezing or rhonchi  Cardiovascular: regular rate  rhythm with normal S1/S2 without murmurs, rubs or gallops. Abdomen: Soft, mildly distended with minimal tenderness around laparoscopic incision sites with no rebound tenderness or guarding. Hypoactive bowel sounds  Musculoskeletal: No clubbing, cyanosis or edema bilaterally. Skin: Warm and dry  Neurologic: Alert, speech clear with no overt facial droop  Psychiatric: Alert and oriented, thought content appropriate, normal insight        Labs:   Recent Labs     12/08/22  0609 12/09/22  1203 12/10/22  0602   WBC 13.1* 10.5 9.9   HGB 9.9* 10.3* 10.0*   HCT 31.4* 32.3* 31.9*    339 331       Recent Labs     12/08/22  0609 12/09/22  1203    137   K 3.8 3.5    100   CO2 28 31   BUN 19 11   CREATININE 0.8 0.7   CALCIUM 8.6 9.2       No results for input(s): AST, ALT, BILIDIR, BILITOT, ALKPHOS in the last 72 hours. No results for input(s): INR in the last 72 hours. No results for input(s): Isela Nasuti in the last 72 hours. Urinalysis:      Lab Results   Component Value Date/Time    NITRU Negative 03/06/2022 05:30 PM    WBCUA 0-2 03/06/2022 05:30 PM    BACTERIA Rare 03/06/2022 05:30 PM    RBCUA None seen 03/06/2022 05:30 PM    BLOODU Negative 03/06/2022 05:30 PM    SPECGRAV 1.015 03/06/2022 05:30 PM    GLUCOSEU Negative 03/06/2022 05:30 PM       Radiology:  XR ABDOMEN (2 VIEWS)   Final Result   Dilated left abdominal small bowel loops may reflect focal ileus versus   developing small bowel obstruction. No perforation identified   radiographically.   Subcutaneous emphysema within the left abdominal wall is likely postoperative. XR CHEST PORTABLE   Final Result   Mild bilateral pulmonary vascular congestion and trace bilateral pleural   effusions. 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  AXR reviewed. Patient passing gas. Diet advanced to full liquid per gen surg        Type II DM: Oral meds on hold. Continue SSI    HTN: not controlled. Added PO hydralazine. Continue lisinopril, HCTZ, BB. Sinus bradycardia : chronic and asymptomatic. Pt stated  HR in 50's at baseline. EKG showed sinus bradycardia which is chronic with  no significant AV block. Patient states that her atenolol was reduced during her previous hospitalization to 50mg daily however saw her cardiologist last week and was increased to 75 mg daily. Can Hold atenolol if HR <50 and can decrease med dose as needed. Monitor on tele      Hypothyroidism  - TSH 3.4 on 11/25/22. continue Synthroid    Leucocytosis: likely due to postsurgical stress response. Resolved       DVT Prophylaxis: lovenox       Diet: ADULT DIET;  Full Liquid  Code Status: Full Code      Dispo - per  Bolivar Rodas MD

## 2022-12-10 NOTE — PROGRESS NOTES
Secure Message sent. Regarding elevated b/p and Simvastin not scheduled. Hello this pt was admitted for abd mass, had a robotic lap on 12/7, that is now develop into ileus. HX of HTN, her B/P is now elevated 175/67 with a HR of 57, b/p taking x2 on different arms. No scheduled or prn b/p med scheduled for tonight. All  her B/P meds were given this morning, SHe would like to take her Lisinopril 40mg at night instead of the morning, also her Simvastin 10mg is not scheduled she is asking for it. Thanks.

## 2022-12-10 NOTE — PLAN OF CARE
Problem: Discharge Planning  Goal: Discharge to home or other facility with appropriate resources  Outcome: Progressing     Problem: Pain  Goal: Verbalizes/displays adequate comfort level or baseline comfort level  Outcome: Not Progressing     Problem: Safety - Adult  Goal: Free from fall injury  Outcome: Progressing     Problem: Gastrointestinal - Adult  Goal: Maintains adequate nutritional intake  Outcome: Not Progressing     Problem: Pain  Goal: Verbalizes/displays adequate comfort level or baseline comfort level  Outcome: Not Progressing     Problem: Gastrointestinal - Adult  Goal: Maintains adequate nutritional intake  Outcome: Not Progressing

## 2022-12-10 NOTE — PROGRESS NOTES
[facility] GENERAL SURGERY    Surgery Progress Note           POD # 2    PATIENT NAME: Ned Alfaro     TODAY'S DATE: 12/10/2022    INTERVAL HISTORY:    Pt  feeling much better, passing gas, appetite improving, minimal pain, ambulating     OBJECTIVE:   VITALS:  BP (!) 152/66   Pulse 56   Temp 98.4 °F (36.9 °C) (Oral)   Resp 16   Ht 5' 4\" (1.626 m)   Wt 162 lb (73.5 kg)   SpO2 96%   BMI 27.81 kg/m²     INTAKE/OUTPUT:    I/O last 3 completed shifts: In: 477 [P.O.:477]  Out: 1700 [Urine:1700]  I/O this shift:  In: 240 [P.O.:240]  Out: -               CONSTITUTIONAL:  awake and alert  LUNGS:  clear to auscultation  ABDOMEN:   hypoactive bowel sounds, soft, non-distended, non-tender   INCISION: clean, dry, no drainage, healing    Data:  CBC:   Recent Labs     12/08/22  0609 12/09/22  1203 12/10/22  0602   WBC 13.1* 10.5 9.9   HGB 9.9* 10.3* 10.0*   HCT 31.4* 32.3* 31.9*    339 331     BMP:    Recent Labs     12/08/22  0609 12/09/22  1203    137   K 3.8 3.5    100   CO2 28 31   BUN 19 11   CREATININE 0.8 0.7   GLUCOSE 123* 104*     Hepatic: No results for input(s): AST, ALT, ALB, BILITOT, ALKPHOS in the last 72 hours. Mag:    No results for input(s): MG in the last 72 hours. Phos:   No results for input(s): PHOS in the last 72 hours. INR: No results for input(s): INR in the last 72 hours.       Radiology Review:       ASSESSMENT AND PLAN:  [de-identified] y.o. female status post Robotic-assisted small bowel and mesentery  Resection, doing well    - advance to full liquids    - awaiting pathology results    - anticipate d/c home tomorrow or Monday           Electronically signed by Mitchell Naik MD

## 2022-12-10 NOTE — PROGRESS NOTES
B/p is elevated with a low HR- secure chart message sent. Hello this pt was admitted for abd mass, had a robotic  lap on 12/7, that is now develop into ileus. HX of HTN, her B/P is now elevated 175/67  with a HR of 57, b/p  taking x2 on different arms. No scheduled or prn  b/p med on board for tonight. Thanks.

## 2022-12-10 NOTE — PROGRESS NOTES
Physician Progress Note      PATIENTAnalia Marin  CSN #:                  [id number]  :                       1940  ADMIT DATE:       2022 8:07 AM  DISCH DATE:  RESPONDING  PROVIDER #:        Mayela Harman MD          QUERY TEXT:    Pt admitted with small bowel mass. Pt noted to have \"mild pulm edema\" per CXR. If possible, please document in the progress notes and discharge summary if   you are evaluating and/or treating any of the following: The medical record reflects the following:  Risk Factors: post op bowel resection  Clinical Indicators: \"CXR showed mild pulm edema\", brief supplemental oxygen,   now on RA  Treatment: hold IVF, CXR, supportive care    Thank you,  Darius Holter, RN, CDS  [email or web address]  Options provided:  -- Noncardiogenic acute pulmonary edema  -- Other - I will add my own diagnosis  -- Disagree - Not applicable / Not valid  -- Disagree - Clinically unable to determine / Unknown  -- Refer to Clinical Documentation Reviewer    PROVIDER RESPONSE TEXT:    Provider disagreed with this query.     Query created by: Jesus Caal on 2022 11:53 PM      Electronically signed by:  Mayela Harman MD 12/10/2022 11:37 AM

## 2022-12-11 VITALS
DIASTOLIC BLOOD PRESSURE: 44 MMHG | RESPIRATION RATE: 16 BRPM | WEIGHT: 162 LBS | HEIGHT: 64 IN | HEART RATE: 65 BPM | SYSTOLIC BLOOD PRESSURE: 114 MMHG | OXYGEN SATURATION: 96 % | BODY MASS INDEX: 27.66 KG/M2 | TEMPERATURE: 98.6 F

## 2022-12-11 PROBLEM — Z01.818 PREOPERATIVE CLEARANCE: Status: RESOLVED | Noted: 2022-11-11 | Resolved: 2022-12-11

## 2022-12-11 LAB
GLUCOSE BLD-MCNC: 130 MG/DL (ref 70–99)
GLUCOSE BLD-MCNC: 138 MG/DL (ref 70–99)
GLUCOSE BLD-MCNC: 150 MG/DL (ref 70–99)
PERFORMED ON: ABNORMAL

## 2022-12-11 PROCEDURE — 6370000000 HC RX 637 (ALT 250 FOR IP): Performed by: INTERNAL MEDICINE

## 2022-12-11 PROCEDURE — 6360000002 HC RX W HCPCS: Performed by: NURSE PRACTITIONER

## 2022-12-11 PROCEDURE — 36415 COLL VENOUS BLD VENIPUNCTURE: CPT

## 2022-12-11 PROCEDURE — 99238 HOSP IP/OBS DSCHRG MGMT 30/<: CPT | Performed by: SURGERY

## 2022-12-11 PROCEDURE — 6370000000 HC RX 637 (ALT 250 FOR IP): Performed by: SURGERY

## 2022-12-11 RX ORDER — ATORVASTATIN CALCIUM 10 MG/1
10 TABLET, FILM COATED ORAL DAILY
Status: DISCONTINUED | OUTPATIENT
Start: 2022-12-11 | End: 2022-12-11 | Stop reason: HOSPADM

## 2022-12-11 RX ORDER — HYDRALAZINE HYDROCHLORIDE 25 MG/1
25 TABLET, FILM COATED ORAL EVERY 8 HOURS SCHEDULED
Qty: 90 TABLET | Refills: 0 | Status: SHIPPED | OUTPATIENT
Start: 2022-12-11

## 2022-12-11 RX ADMIN — HYDROCHLOROTHIAZIDE 25 MG: 25 TABLET ORAL at 08:52

## 2022-12-11 RX ADMIN — ATENOLOL 75 MG: 25 TABLET ORAL at 08:52

## 2022-12-11 RX ADMIN — HYDRALAZINE HYDROCHLORIDE 25 MG: 25 TABLET, FILM COATED ORAL at 05:40

## 2022-12-11 RX ADMIN — FAMOTIDINE 20 MG: 20 TABLET ORAL at 08:52

## 2022-12-11 RX ADMIN — ATORVASTATIN CALCIUM 10 MG: 10 TABLET, FILM COATED ORAL at 11:51

## 2022-12-11 RX ADMIN — HYDRALAZINE HYDROCHLORIDE 10 MG: 20 INJECTION INTRAMUSCULAR; INTRAVENOUS at 06:51

## 2022-12-11 ASSESSMENT — PAIN SCALES - GENERAL
PAINLEVEL_OUTOF10: 0
PAINLEVEL_OUTOF10: 0

## 2022-12-11 NOTE — PROGRESS NOTES
Hospitalist Progress Note      PCP: Reji Nam MD    Date of Admission: 12/7/2022    Chief Complaint: jejunal mass    Hospital Course:    [de-identified] y.o. female who we are asked to see/evaluate by Kathya Mcintosh MD for postoperative medical management of this individual who recently underwent small bowel resection for jejunal mass. Hospitalist consulted to manage chronic comorbid conditions including HTN, type II DM, and hypothyroidism      Subjective:      Pt doing well today and tolerating diet. Pt being discharged today. Per RN got IV hydralazine early this am for BP in 170's but now improved.  Pt denies any SOB, chest pain , fever or chills         Medications:  Reviewed    Infusion Medications    sodium chloride 50 mL/hr at 12/10/22 1442    sodium chloride      dextrose      dextrose       Scheduled Medications    atorvastatin  10 mg Oral Daily    hydrALAZINE  25 mg Oral 3 times per day    lisinopril  40 mg Oral Daily    hydroCHLOROthiazide  25 mg Oral Daily    indocyanine green  5 mg IntraVENous Once    atenolol  75 mg Oral Daily    latanoprost  1 drop Both Eyes Nightly    Latanoprostene Bunod  1 drop Ophthalmic Nightly    montelukast  10 mg Oral Nightly    sodium chloride flush  5-40 mL IntraVENous 2 times per day    famotidine  20 mg Oral Daily    Or    famotidine (PEPCID) injection  20 mg IntraVENous Daily    insulin lispro  0-8 Units SubCUTAneous Q4H     PRN Meds: hydrALAZINE, benzocaine-menthol, sodium chloride flush, sodium chloride, ondansetron **OR** ondansetron, acetaminophen, oxyCODONE **OR** oxyCODONE, morphine **OR** morphine, aluminum & magnesium hydroxide-simethicone, glucose, dextrose bolus **OR** dextrose bolus, glucagon (rDNA), dextrose, glucose, dextrose bolus **OR** dextrose bolus, glucagon (rDNA), dextrose      Intake/Output Summary (Last 24 hours) at 12/11/2022 0944  Last data filed at 12/10/2022 1831  Gross per 24 hour   Intake 1548 ml   Output 300 ml   Net 1248 ml Physical Exam Performed:    BP (!) 114/44   Pulse 65   Temp 98.6 °F (37 °C) (Oral)   Resp 16   Ht 5' 4\" (1.626 m)   Wt 162 lb (73.5 kg)   SpO2 96%   BMI 27.81 kg/m²     General appearance: No apparent distress, appears stated age and cooperative. HEENT: Pupils equal, round, and reactive to light. Conjunctivae/corneas clear. Neck: Supple, with full range of motion. No jugular venous distention. Trachea midline. Respiratory:  Normal respiratory effort. Clear to auscultation bilaterally-no overt wheezing or rhonchi  Cardiovascular: regular rate  rhythm with normal S1/S2 without murmurs, rubs or gallops. Abdomen: Soft, mildly distended with non tender. Laparoscopic incision sites c/d/i. no rebound tenderness or guarding.  + bowel sounds  Musculoskeletal: No clubbing, cyanosis or edema bilaterally. Skin: Warm and dry  Neurologic: Alert, speech clear with no overt facial droop  Psychiatric: Alert and oriented, thought content appropriate, normal insight        Labs:   Recent Labs     12/09/22  1203 12/10/22  0602   WBC 10.5 9.9   HGB 10.3* 10.0*   HCT 32.3* 31.9*    331       Recent Labs     12/09/22  1203      K 3.5      CO2 31   BUN 11   CREATININE 0.7   CALCIUM 9.2       No results for input(s): AST, ALT, BILIDIR, BILITOT, ALKPHOS in the last 72 hours. No results for input(s): INR in the last 72 hours. No results for input(s): Ingrid Fuse in the last 72 hours. Urinalysis:      Lab Results   Component Value Date/Time    NITRU Negative 03/06/2022 05:30 PM    WBCUA 0-2 03/06/2022 05:30 PM    BACTERIA Rare 03/06/2022 05:30 PM    RBCUA None seen 03/06/2022 05:30 PM    BLOODU Negative 03/06/2022 05:30 PM    SPECGRAV 1.015 03/06/2022 05:30 PM    GLUCOSEU Negative 03/06/2022 05:30 PM       Radiology:  XR ABDOMEN (2 VIEWS)   Final Result   Dilated left abdominal small bowel loops may reflect focal ileus versus   developing small bowel obstruction.   No perforation identified radiographically. Subcutaneous emphysema within the left abdominal wall is   likely postoperative. XR CHEST PORTABLE   Final Result   Mild bilateral pulmonary vascular congestion and trace bilateral pleural   effusions. 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 Pt being discharged today       Type II DM: on   SSI. Ok to resume home oral meds at d/c. HTN: was not controlled. Asymptomatic. PO hydralazine added to home med regimen on 12/10. Continue PO hydralazine at d/c and  home doses of  HCTZ, atenolol and lisinopril. Pt advised to monitor BP at home and f/u with PCP this week or next for med titration as needed. PO hydralazine was sent to her pharmacy. Hypothyroidism  - TSH 3.4 on 11/25/22. continue Synthroid          DVT Prophylaxis: lovenox       Diet: ADULT DIET; Regular; Low Fiber  Code Status: Full Code      Dispo - per  Gen surg. [id number] Jacquelyn Castro for d/c from medical standpoint.  Plan of care discussed with pt and MARLA Grider MD

## 2022-12-11 NOTE — DISCHARGE SUMMARY
Surgery Discharge Summary    Patient Identification  Kaveh Streeter is a [de-identified] y.o. female. :  1940  Admit Date:  2022    Discharge date:   2022                                  Disposition: home    Discharge Diagnoses:   Principal Problem:    Mesenteric mass  Active Problems:    S/P small bowel resection    History of bladder cancer    Essential hypertension    Type 2 diabetes mellitus ([address]  Resolved Problems:    * No resolved hospital problems.  *      Discharge condition: good    Discharge Medications:     Current Discharge Medication List        CONTINUE these medications which have NOT CHANGED    Details   aspirin 81 MG EC tablet Take 81 mg by mouth daily      Latanoprostene Bunod (VYZULTA) 0.024 % SOLN Apply 1 drop to eye nightly Both eyes      pantoprazole sodium (PROTONIX) 40 MG PACK packet Take 40 mg by mouth every morning (before breakfast)      atenolol (TENORMIN) 50 MG tablet Take 1.5 tablets by mouth daily  Qty: 90 tablet, Refills: 5      hydroCHLOROthiazide (HYDRODIURIL) 25 MG tablet Take 1 tablet by mouth every morning  Qty: 90 tablet, Refills: 3      glipiZIDE (GLUCOTROL XL) 2.5 MG extended release tablet Take 2.5 mg by mouth daily      Glucosamine 500 MG CAPS Take by mouth      escitalopram (LEXAPRO) 10 MG tablet Take 20 mg by mouth daily      montelukast (SINGULAIR) 10 MG tablet Take 10 mg by mouth nightly      simvastatin (ZOCOR) 10 MG tablet Take 10 mg by mouth nightly      lisinopril (PRINIVIL;ZESTRIL) 40 MG tablet Take 40 mg by mouth nightly      metFORMIN (GLUCOPHAGE) 1000 MG tablet Take 850 mg by mouth 3 times daily                Current Discharge Medication List            Most Recent Labs:    CBC:   Recent Labs     22  1203 12/10/22  0602   WBC 10.5 9.9   HGB 10.3* 10.0*   HCT 32.3* 31.9*    331     BMP:    Recent Labs     22  1203      K 3.5      CO2 31   BUN 11   CREATININE 0.7   GLUCOSE 104*     Hepatic: No results for input(s): AST, ALT, ALB, BILITOT, ALKPHOS in the last 72 hours. PT/INR:  No results for input(s): INR in the last 72 hours. Consults: none    Surgery: robotic-assisted small bowel and mesenteric resection    Patient Instructions: Activity: no heavy lifting, pushing, pulling for 2 weeks, no driving for 1 weeks or while on analgesics  Diet: As tolerated  Follow-up with Dr Dong Moore in 2 weeks. The patient and/or family/patient representatives, were provided education regarding discharge instructions, ongoing treatment and follow-up. Details of information given to the patient may be found in the discharge instructions located in the EMR. HPI and Hospital Course:   Admitted and taken to OR for elective proximal small bowel resection. Uneventful postop course with return of bowel function. Awaiting final pathology report.       Will Ray MD    [facility]

## 2022-12-11 NOTE — PROGRESS NOTES
[facility] GENERAL SURGERY    Surgery Progress Note           POD # 3    PATIENT NAME: Sidra Henderson     TODAY'S DATE: 12/11/2022    INTERVAL HISTORY:    Pt  feels well, moving bowels, passing flatus, eating solid food, minimal pain . OBJECTIVE:   VITALS:  BP (!) 158/61   Pulse 62   Temp 98 °F (36.7 °C) (Oral)   Resp 16   Ht 5' 4\" (1.626 m)   Wt 162 lb (73.5 kg)   SpO2 96%   BMI 27.81 kg/m²     INTAKE/OUTPUT:    I/O last 3 completed shifts: In: 2570 [P.O.:840; I.V.:948]  Out: 2000 [Urine:2000]  No intake/output data recorded. CONSTITUTIONAL:  awake and alert  LUNGS:     ABDOMEN:    , soft, non-distended, non-tender   INCISION: clean, dry, no drainage, healing    Data:  CBC:   Recent Labs     12/09/22  1203 12/10/22  0602   WBC 10.5 9.9   HGB 10.3* 10.0*   HCT 32.3* 31.9*    331     BMP:    Recent Labs     12/09/22  1203      K 3.5      CO2 31   BUN 11   CREATININE 0.7   GLUCOSE 104*     Hepatic: No results for input(s): AST, ALT, ALB, BILITOT, ALKPHOS in the last 72 hours. Mag:    No results for input(s): MG in the last 72 hours. Phos:   No results for input(s): PHOS in the last 72 hours. INR: No results for input(s): INR in the last 72 hours.       Pathology: still pending    ASSESSMENT AND PLAN:  [de-identified] y.o. female status post  Robotic-assisted small bowel and mesentery  Resection, doing well   - d/c home   - f/u on Pathology results   - f/u in office in 2 weeks or as needed         Electronically signed by Ellyn Do MD

## 2022-12-11 NOTE — PROGRESS NOTES
Pt and family verbalized understanding of discharge instructions. No questions or concerns. Pt taken out to vehicle by wheel chair and safe on departure. Lisa Charles RN

## 2022-12-11 NOTE — DISCHARGE INSTRUCTIONS
[facility] AND [place]    [facility]. Alicja Son M.D. [address] [place] [address] Karolina Ann                [address] Air[address] Parrish M.D. [address] [place] [address], [address] [place] [address]         [place] [address] [place] [address] Lucy Robin M.D                         [phone]        [facility] SHAI Cobb M.D. [facility]                                                          POST-OPERATIVE INSTRUCTIONS FOLLOWING A BOWEL RESECTION    Call the office to schedule your post-operative appointment with your surgeon for two (2) weeks. You will have pain medicine ordered. Take as directed. Your incision is closed with: White steri-strips; these will peel away in 7-10 days. Surgical glue   Staples, these will be removed in the office during your post-op  appointment. You may shower. Wash incisions gently, and pat them dry. Do not rub your incisions. General guidelines for activity:   Avoid strenuous activity or lifting anything heavier than 15 pounds. It is OK to be up walking around, and up and down stairs. Do what is comfortable: stop and rest when you feel tired. Drink plenty of fluids and stay on a bland diet for 2-3 days after surgery. Do NOT drive until after your first post-operative appointment and while taking your narcotic pain medicine     Watch for signs of infection:  Excessive warmth or bright redness around your incisions  Leakage of bloody or cloudy fluid from you incisions  Fever over 100.5    If you experience constipation:  Increase your water intake  Increase your activity, walking is best.  A stool softener or mild laxative may be necessary if you still have not had a bowel  movement ; call the office for further instructions.       Please take note: IF you do not take all of your narcotic pain medication, we ask that you dispose of these responsibly. Drug drop off boxes are located in the [facility] and [facility] emergency departments. These Med Safe return cabinets are available 24/7 in the emergency department lobbies. Hospital of office staff may NOT accept any medications to drop off in the cabinet. Monitor blood pressure daily at home and take meds as prescribed.    Follow up with PCP in one week

## 2022-12-11 NOTE — CARE COORDINATION
Chart reviewed day 4. Care managed per Surgery. POD 3 sm bowel resection. Diet advanced to regular low fiber today. If elizabeth likely to d/c soon. Per notes, Abigail Jono, has support of family that will be staying with her. No DCP needs identified.  Lisa Acosta RN

## 2022-12-11 NOTE — PLAN OF CARE
Problem: Chronic Conditions and Co-morbidities  Goal: Patient's chronic conditions and co-morbidity symptoms are monitored and maintained or improved  12/11/2022 1218 by Enrique Clifford RN  Outcome: Completed  Flowsheets (Taken 12/11/2022 1218)  Care Plan - Patient's Chronic Conditions and Co-Morbidity Symptoms are Monitored and Maintained or Improved:   Monitor and assess patient's chronic conditions and comorbid symptoms for stability, deterioration, or improvement   Collaborate with multidisciplinary team to address chronic and comorbid conditions and prevent exacerbation or deterioration   Update acute care plan with appropriate goals if chronic or comorbid symptoms are exacerbated and prevent overall improvement and discharge  12/11/2022 1142 by Enrique Clifford RN  Outcome: Progressing     Problem: Discharge Planning  Goal: Discharge to home or other facility with appropriate resources  12/11/2022 1218 by Enrique Clifford RN  Outcome: Completed  Flowsheets (Taken 12/11/2022 1218)  Discharge to home or other facility with appropriate resources:   Identify barriers to discharge with patient and caregiver   Arrange for needed discharge resources and transportation as appropriate   Identify discharge learning needs (meds, wound care, etc)  12/11/2022 1142 by Enrique Clifford RN  Outcome: Progressing     Problem: Pain  Goal: Verbalizes/displays adequate comfort level or baseline comfort level  12/11/2022 1218 by Enrique Clifford RN  Outcome: Completed  Flowsheets (Taken 12/11/2022 1218)  Verbalizes/displays adequate comfort level or baseline comfort level:   Encourage patient to monitor pain and request assistance   Assess pain using appropriate pain scale   Administer analgesics based on type and severity of pain and evaluate response  12/11/2022 1142 by Enrique Clifford RN  Outcome: Progressing     Problem: Safety - Adult  Goal: Free from fall injury  12/11/2022 1218 by Dottie Duane Knox RN  Outcome: Completed  Flowsheets (Taken 12/11/2022 1218)  Free From Fall Injury:   Instruct family/caregiver on patient safety   Based on caregiver fall risk screen, instruct family/caregiver to ask for assistance with transferring infant if caregiver noted to have fall risk factors  12/11/2022 1142 by Ian Lauren RN  Outcome: Progressing     Problem: Neurosensory - Adult  Goal: Achieves stable or improved neurological status  12/11/2022 1218 by Ian Lauren RN  Outcome: Completed  Flowsheets (Taken 12/11/2022 1218)  Achieves stable or improved neurological status:   Assess for and report changes in neurological status   Initiate measures to prevent increased intracranial pressure   Maintain blood pressure and fluid volume within ordered parameters to optimize cerebral perfusion and minimize risk of hemorrhage  12/11/2022 1142 by Ian Lauren RN  Outcome: Progressing  Goal: Achieves maximal functionality and self care  12/11/2022 1218 by Ian Lauren RN  Outcome: Completed  12/11/2022 1142 by Ian Lauren RN  Outcome: Progressing     Problem: Gastrointestinal - Adult  Goal: Minimal or absence of nausea and vomiting  12/11/2022 1218 by Ian Lauren RN  Outcome: Completed  Flowsheets (Taken 12/11/2022 1218)  Minimal or absence of nausea and vomiting:   Administer IV fluids as ordered to ensure adequate hydration   Maintain NPO status until nausea and vomiting are resolved   Administer ordered antiemetic medications as needed   Provide nonpharmacologic comfort measures as appropriate  12/11/2022 1142 by Ian Lauren RN  Outcome: Progressing  Goal: Maintains or returns to baseline bowel function  12/11/2022 1218 by Ian Lauren RN  Outcome: Completed  Flowsheets (Taken 12/11/2022 1218)  Maintains or returns to baseline bowel function:   Assess bowel function   Encourage oral fluids to ensure adequate hydration   Administer IV fluids as ordered to ensure adequate hydration  12/11/2022 1142 by Chio Rodriguez RN  Outcome: Progressing  Goal: Maintains adequate nutritional intake  12/11/2022 1218 by Chio Rodriguez RN  Outcome: Completed  12/11/2022 1142 by Chio Rodriguez RN  Outcome: Progressing

## 2022-12-11 NOTE — PLAN OF CARE
Problem: Pain  Goal: Verbalizes/displays adequate comfort level or baseline comfort level  Outcome: Progressing   Pt denies having discomfort. Pt denies the need for pain medication at this time. Ronnie Monaco RN

## 2022-12-11 NOTE — PROGRESS NOTES
Pt alert and orientated. Call light within reach. No complaints at this time. Pt tolerating diet. Ana Tay RN

## 2022-12-27 ENCOUNTER — OFFICE VISIT (OUTPATIENT)
Dept: SURGERY | Age: 82
End: 2022-12-27

## 2022-12-27 VITALS
WEIGHT: 163.4 LBS | HEIGHT: 64 IN | DIASTOLIC BLOOD PRESSURE: 68 MMHG | BODY MASS INDEX: 27.9 KG/M2 | HEART RATE: 68 BPM | SYSTOLIC BLOOD PRESSURE: 168 MMHG

## 2022-12-27 DIAGNOSIS — K63.89 MESENTERIC MASS: ICD-10-CM

## 2022-12-27 DIAGNOSIS — Z09 POSTOP CHECK: Primary | ICD-10-CM

## 2022-12-27 PROCEDURE — 99024 POSTOP FOLLOW-UP VISIT: CPT | Performed by: SURGERY

## 2022-12-27 NOTE — PROGRESS NOTES
Surgery Post-op Progress Note    HPI:  Notes reviewed, and agree with documentation in pt's chart. Postoperative Follow-up: Patient presents for 2 week follow-up status post small bowel resection for mesenteric lesion seen on recent imaging . Eating a regular diet without difficulty. Bowel movements are Normal.  The patient is not having any pain. .     ROS:    10 point review of systems performed; please refer to HPI with pertinent positives, all other ROS are negative    A review of the patient's record including allergies, medication list, tobacco history, family history, problem list, medical history and social history has been completed and updates made to the patient's EMR where indicated. PE:   CONSTITUTIONAL:  awake and alert    ABDOMEN: soft, non-distended, non-tender     INCISION: clean, dry, no drainage, healing    FINAL DIAGNOSIS:     Portion of jejunum, resection:      - Features consistent with sclerosing mesenteritis. See comment. - 2 lymph nodes negative for malignancy (0/2). COMMENT:   The specimen consists of reactive enteric tissue with attached   mesentery showing nodular fibrosis. Immunohistochemical stains are   performed to better characterize the tissue. Beta catenin is interpreted   as negative within the rare spindled cells in the fibrotic tissue.    and desmin are also interpreted as negative. The morphologic features   and immunohistochemical staining profile are most consistent with the   diagnosis of sclerosing mesenteritis. Clinical and radiographic   correlation recommended. CORDELLJA/BARJA \        ASSESSMENT:   Diagnosis Orders   1. Postop check          S/P resection of benign mesenteric lesion.   No further intervention or follow up should be needed in relation to this pathology    PLAN:    Continue with routine wound care as discussed  Gradually increase activities as tolerated  Follow-up as needed; please call with questions or concerns

## 2023-01-26 PROBLEM — Z09 POSTOP CHECK: Status: RESOLVED | Noted: 2022-12-27 | Resolved: 2023-01-26

## 2023-04-24 ENCOUNTER — TELEPHONE (OUTPATIENT)
Dept: SURGERY | Age: 83
End: 2023-04-24

## 2023-04-24 NOTE — TELEPHONE ENCOUNTER
Pt's daughter called and said that her Mother is a pt of Dr. Filiberto Connor and that she would like to know if he can recommend a Hematologist for her? Her hemoglobin is down to 8. Please call her and thank you!  # [phone]

## 2023-04-25 DIAGNOSIS — D64.9 ANEMIA, UNSPECIFIED TYPE: Primary | ICD-10-CM

## 2023-05-08 ENCOUNTER — HOSPITAL ENCOUNTER (INPATIENT)
Age: 83
LOS: 1 days | Discharge: HOME | DRG: 690 | End: 2023-05-09
Attending: EMERGENCY MEDICINE | Admitting: INTERNAL MEDICINE
Payer: MEDICARE

## 2023-05-08 ENCOUNTER — APPOINTMENT (OUTPATIENT)
Dept: CT IMAGING | Age: 83
DRG: 690 | End: 2023-05-08
Payer: MEDICARE

## 2023-05-08 DIAGNOSIS — Z85.51 HISTORY OF BLADDER CANCER: ICD-10-CM

## 2023-05-08 DIAGNOSIS — R10.9 ABDOMINAL PAIN, UNSPECIFIED ABDOMINAL LOCATION: ICD-10-CM

## 2023-05-08 DIAGNOSIS — N10 ACUTE PYELONEPHRITIS: Primary | ICD-10-CM

## 2023-05-08 PROBLEM — N12 PYELONEPHRITIS: Status: ACTIVE | Noted: 2023-05-08

## 2023-05-08 LAB
ALBUMIN SERPL-MCNC: 4.5 G/DL (ref 3.4–5)
ALBUMIN/GLOB SERPL: 2 {RATIO} (ref 1.1–2.2)
ALP SERPL-CCNC: 37 U/L (ref 40–129)
ALT SERPL-CCNC: 10 U/L (ref 10–40)
ANION GAP SERPL CALCULATED.3IONS-SCNC: 13 MMOL/L (ref 3–16)
AST SERPL-CCNC: 11 U/L (ref 15–37)
BACTERIA URNS QL MICRO: ABNORMAL /HPF
BASOPHILS # BLD: 0.1 K/UL (ref 0–0.2)
BASOPHILS NFR BLD: 0.5 %
BILIRUB SERPL-MCNC: 0.3 MG/DL (ref 0–1)
BILIRUB UR QL STRIP.AUTO: NEGATIVE
BUN SERPL-MCNC: 31 MG/DL (ref 7–20)
CALCIUM SERPL-MCNC: 10.2 MG/DL (ref 8.3–10.6)
CHLORIDE SERPL-SCNC: 103 MMOL/L (ref 99–110)
CLARITY UR: CLEAR
CO2 SERPL-SCNC: 21 MMOL/L (ref 21–32)
COLOR UR: YELLOW
CREAT SERPL-MCNC: 1.1 MG/DL (ref 0.6–1.2)
DEPRECATED RDW RBC AUTO: 22.9 % (ref 12.4–15.4)
EOSINOPHIL # BLD: 0.1 K/UL (ref 0–0.6)
EOSINOPHIL NFR BLD: 0.5 %
GFR SERPLBLD CREATININE-BSD FMLA CKD-EPI: 50 ML/MIN/{1.73_M2}
GLUCOSE BLD-MCNC: 80 MG/DL (ref 70–99)
GLUCOSE SERPL-MCNC: 115 MG/DL (ref 70–99)
GLUCOSE UR STRIP.AUTO-MCNC: NEGATIVE MG/DL
HCT VFR BLD AUTO: 33.6 % (ref 36–48)
HGB BLD-MCNC: 10.5 G/DL (ref 12–16)
HGB UR QL STRIP.AUTO: NEGATIVE
KETONES UR STRIP.AUTO-MCNC: NEGATIVE MG/DL
LACTATE BLDV-SCNC: 1.9 MMOL/L (ref 0.4–1.9)
LEUKOCYTE ESTERASE UR QL STRIP.AUTO: ABNORMAL
LIPASE SERPL-CCNC: 52 U/L (ref 13–60)
LYMPHOCYTES # BLD: 0.8 K/UL (ref 1–5.1)
LYMPHOCYTES NFR BLD: 8.5 %
MCH RBC QN AUTO: 24.2 PG (ref 26–34)
MCHC RBC AUTO-ENTMCNC: 31.3 G/DL (ref 31–36)
MCV RBC AUTO: 77.1 FL (ref 80–100)
MONOCYTES # BLD: 0.5 K/UL (ref 0–1.3)
MONOCYTES NFR BLD: 5.4 %
NEUTROPHILS # BLD: 8.3 K/UL (ref 1.7–7.7)
NEUTROPHILS NFR BLD: 85.1 %
NITRITE UR QL STRIP.AUTO: NEGATIVE
PERFORMED ON: NORMAL
PH UR STRIP.AUTO: 5 [PH] (ref 5–8)
PLATELET # BLD AUTO: 364 K/UL (ref 135–450)
PMV BLD AUTO: 8.3 FL (ref 5–10.5)
POTASSIUM SERPL-SCNC: 4.6 MMOL/L (ref 3.5–5.1)
PROT SERPL-MCNC: 6.7 G/DL (ref 6.4–8.2)
PROT UR STRIP.AUTO-MCNC: NEGATIVE MG/DL
RBC # BLD AUTO: 4.36 M/UL (ref 4–5.2)
RBC #/AREA URNS HPF: ABNORMAL /HPF (ref 0–4)
SARS-COV-2 RDRP RESP QL NAA+PROBE: NOT DETECTED
SODIUM SERPL-SCNC: 137 MMOL/L (ref 136–145)
SP GR UR STRIP.AUTO: 1.02 (ref 1–1.03)
UA COMPLETE W REFLEX CULTURE PNL UR: YES
UA DIPSTICK W REFLEX MICRO PNL UR: YES
URN SPEC COLLECT METH UR: ABNORMAL
UROBILINOGEN UR STRIP-ACNC: 0.2 E.U./DL
WBC # BLD AUTO: 9.8 K/UL (ref 4–11)
WBC #/AREA URNS HPF: ABNORMAL /HPF (ref 0–5)

## 2023-05-08 PROCEDURE — 2580000003 HC RX 258: Performed by: INTERNAL MEDICINE

## 2023-05-08 PROCEDURE — 96361 HYDRATE IV INFUSION ADD-ON: CPT

## 2023-05-08 PROCEDURE — 85025 COMPLETE CBC W/AUTO DIFF WBC: CPT

## 2023-05-08 PROCEDURE — 96376 TX/PRO/DX INJ SAME DRUG ADON: CPT

## 2023-05-08 PROCEDURE — 6360000004 HC RX CONTRAST MEDICATION: Performed by: PHYSICIAN ASSISTANT

## 2023-05-08 PROCEDURE — 2580000003 HC RX 258: Performed by: PHYSICIAN ASSISTANT

## 2023-05-08 PROCEDURE — 83690 ASSAY OF LIPASE: CPT

## 2023-05-08 PROCEDURE — 87635 SARS-COV-2 COVID-19 AMP PRB: CPT

## 2023-05-08 PROCEDURE — 80053 COMPREHEN METABOLIC PANEL: CPT

## 2023-05-08 PROCEDURE — 96365 THER/PROPH/DIAG IV INF INIT: CPT

## 2023-05-08 PROCEDURE — 81001 URINALYSIS AUTO W/SCOPE: CPT

## 2023-05-08 PROCEDURE — 87040 BLOOD CULTURE FOR BACTERIA: CPT

## 2023-05-08 PROCEDURE — 6360000002 HC RX W HCPCS: Performed by: PHYSICIAN ASSISTANT

## 2023-05-08 PROCEDURE — 2500000003 HC RX 250 WO HCPCS: Performed by: PHYSICIAN ASSISTANT

## 2023-05-08 PROCEDURE — 96375 TX/PRO/DX INJ NEW DRUG ADDON: CPT

## 2023-05-08 PROCEDURE — 1200000000 HC SEMI PRIVATE

## 2023-05-08 PROCEDURE — 83605 ASSAY OF LACTIC ACID: CPT

## 2023-05-08 PROCEDURE — 83036 HEMOGLOBIN GLYCOSYLATED A1C: CPT

## 2023-05-08 PROCEDURE — 74177 CT ABD & PELVIS W/CONTRAST: CPT

## 2023-05-08 PROCEDURE — 87086 URINE CULTURE/COLONY COUNT: CPT

## 2023-05-08 PROCEDURE — 99285 EMERGENCY DEPT VISIT HI MDM: CPT

## 2023-05-08 PROCEDURE — 6370000000 HC RX 637 (ALT 250 FOR IP): Performed by: INTERNAL MEDICINE

## 2023-05-08 RX ORDER — SODIUM CHLORIDE 0.9 % (FLUSH) 0.9 %
10 SYRINGE (ML) INJECTION EVERY 12 HOURS SCHEDULED
Status: DISCONTINUED | OUTPATIENT
Start: 2023-05-08 | End: 2023-05-09 | Stop reason: HOSPADM

## 2023-05-08 RX ORDER — ASPIRIN 81 MG/1
81 TABLET ORAL DAILY
Status: DISCONTINUED | OUTPATIENT
Start: 2023-05-09 | End: 2023-05-09 | Stop reason: HOSPADM

## 2023-05-08 RX ORDER — ESCITALOPRAM OXALATE 10 MG/1
20 TABLET ORAL DAILY
Status: DISCONTINUED | OUTPATIENT
Start: 2023-05-09 | End: 2023-05-09 | Stop reason: HOSPADM

## 2023-05-08 RX ORDER — HYDROCHLOROTHIAZIDE 25 MG/1
25 TABLET ORAL EVERY MORNING
Status: DISCONTINUED | OUTPATIENT
Start: 2023-05-09 | End: 2023-05-09 | Stop reason: HOSPADM

## 2023-05-08 RX ORDER — MORPHINE SULFATE 4 MG/ML
4 INJECTION, SOLUTION INTRAMUSCULAR; INTRAVENOUS ONCE
Status: COMPLETED | OUTPATIENT
Start: 2023-05-08 | End: 2023-05-08

## 2023-05-08 RX ORDER — MONTELUKAST SODIUM 10 MG/1
10 TABLET ORAL NIGHTLY
Status: DISCONTINUED | OUTPATIENT
Start: 2023-05-08 | End: 2023-05-09 | Stop reason: HOSPADM

## 2023-05-08 RX ORDER — SODIUM CHLORIDE 0.9 % (FLUSH) 0.9 %
10 SYRINGE (ML) INJECTION PRN
Status: DISCONTINUED | OUTPATIENT
Start: 2023-05-08 | End: 2023-05-09 | Stop reason: HOSPADM

## 2023-05-08 RX ORDER — GLIPIZIDE 2.5 MG/1
10 TABLET, EXTENDED RELEASE ORAL DAILY
COMMUNITY

## 2023-05-08 RX ORDER — PANTOPRAZOLE SODIUM 40 MG/1
40 TABLET, DELAYED RELEASE ORAL
Status: DISCONTINUED | OUTPATIENT
Start: 2023-05-09 | End: 2023-05-09 | Stop reason: HOSPADM

## 2023-05-08 RX ORDER — POLYETHYLENE GLYCOL 3350 17 G/17G
17 POWDER, FOR SOLUTION ORAL DAILY PRN
Status: DISCONTINUED | OUTPATIENT
Start: 2023-05-08 | End: 2023-05-09 | Stop reason: HOSPADM

## 2023-05-08 RX ORDER — ACETAMINOPHEN 325 MG/1
650 TABLET ORAL EVERY 4 HOURS PRN
Status: DISCONTINUED | OUTPATIENT
Start: 2023-05-08 | End: 2023-05-09 | Stop reason: HOSPADM

## 2023-05-08 RX ORDER — ATORVASTATIN CALCIUM 10 MG/1
10 TABLET, FILM COATED ORAL DAILY
Status: DISCONTINUED | OUTPATIENT
Start: 2023-05-09 | End: 2023-05-09 | Stop reason: HOSPADM

## 2023-05-08 RX ORDER — LEVOTHYROXINE SODIUM 0.1 MG/1
TABLET ORAL
COMMUNITY
Start: 2023-03-02

## 2023-05-08 RX ORDER — SODIUM CHLORIDE 9 MG/ML
INJECTION, SOLUTION INTRAVENOUS PRN
Status: DISCONTINUED | OUTPATIENT
Start: 2023-05-08 | End: 2023-05-09 | Stop reason: HOSPADM

## 2023-05-08 RX ORDER — FAMOTIDINE 10 MG/ML
20 INJECTION, SOLUTION INTRAVENOUS ONCE
Status: COMPLETED | OUTPATIENT
Start: 2023-05-08 | End: 2023-05-08

## 2023-05-08 RX ORDER — HYDRALAZINE HYDROCHLORIDE 25 MG/1
25 TABLET, FILM COATED ORAL EVERY 8 HOURS SCHEDULED
Status: DISCONTINUED | OUTPATIENT
Start: 2023-05-08 | End: 2023-05-09 | Stop reason: HOSPADM

## 2023-05-08 RX ORDER — ONDANSETRON 2 MG/ML
4 INJECTION INTRAMUSCULAR; INTRAVENOUS ONCE
Status: COMPLETED | OUTPATIENT
Start: 2023-05-08 | End: 2023-05-08

## 2023-05-08 RX ORDER — ONDANSETRON 2 MG/ML
4 INJECTION INTRAMUSCULAR; INTRAVENOUS EVERY 4 HOURS PRN
Status: DISCONTINUED | OUTPATIENT
Start: 2023-05-08 | End: 2023-05-09 | Stop reason: HOSPADM

## 2023-05-08 RX ORDER — SPIRONOLACTONE 25 MG/1
25 TABLET ORAL DAILY
COMMUNITY

## 2023-05-08 RX ORDER — 0.9 % SODIUM CHLORIDE 0.9 %
250 INTRAVENOUS SOLUTION INTRAVENOUS ONCE
Status: COMPLETED | OUTPATIENT
Start: 2023-05-08 | End: 2023-05-08

## 2023-05-08 RX ORDER — KETOROLAC TROMETHAMINE 30 MG/ML
15 INJECTION, SOLUTION INTRAMUSCULAR; INTRAVENOUS ONCE
Status: COMPLETED | OUTPATIENT
Start: 2023-05-08 | End: 2023-05-08

## 2023-05-08 RX ORDER — SPIRONOLACTONE 25 MG/1
25 TABLET ORAL DAILY
Status: DISCONTINUED | OUTPATIENT
Start: 2023-05-09 | End: 2023-05-09 | Stop reason: HOSPADM

## 2023-05-08 RX ORDER — INSULIN LISPRO 100 [IU]/ML
0-8 INJECTION, SOLUTION INTRAVENOUS; SUBCUTANEOUS
Status: DISCONTINUED | OUTPATIENT
Start: 2023-05-09 | End: 2023-05-09 | Stop reason: HOSPADM

## 2023-05-08 RX ORDER — DEXTROSE MONOHYDRATE 100 MG/ML
INJECTION, SOLUTION INTRAVENOUS CONTINUOUS PRN
Status: DISCONTINUED | OUTPATIENT
Start: 2023-05-08 | End: 2023-05-09 | Stop reason: HOSPADM

## 2023-05-08 RX ORDER — ENOXAPARIN SODIUM 100 MG/ML
40 INJECTION SUBCUTANEOUS DAILY
Status: DISCONTINUED | OUTPATIENT
Start: 2023-05-09 | End: 2023-05-09 | Stop reason: HOSPADM

## 2023-05-08 RX ORDER — ACETAMINOPHEN 650 MG/1
650 SUPPOSITORY RECTAL EVERY 4 HOURS PRN
Status: DISCONTINUED | OUTPATIENT
Start: 2023-05-08 | End: 2023-05-09 | Stop reason: HOSPADM

## 2023-05-08 RX ORDER — ATENOLOL 25 MG/1
75 TABLET ORAL DAILY
Status: DISCONTINUED | OUTPATIENT
Start: 2023-05-09 | End: 2023-05-09 | Stop reason: HOSPADM

## 2023-05-08 RX ORDER — INSULIN LISPRO 100 [IU]/ML
0-4 INJECTION, SOLUTION INTRAVENOUS; SUBCUTANEOUS NIGHTLY
Status: DISCONTINUED | OUTPATIENT
Start: 2023-05-08 | End: 2023-05-09 | Stop reason: HOSPADM

## 2023-05-08 RX ORDER — SODIUM CHLORIDE 9 MG/ML
INJECTION, SOLUTION INTRAVENOUS CONTINUOUS
Status: DISCONTINUED | OUTPATIENT
Start: 2023-05-08 | End: 2023-05-09

## 2023-05-08 RX ADMIN — MORPHINE SULFATE 4 MG: 4 INJECTION, SOLUTION INTRAMUSCULAR; INTRAVENOUS at 18:21

## 2023-05-08 RX ADMIN — HYDRALAZINE HYDROCHLORIDE 25 MG: 25 TABLET, FILM COATED ORAL at 22:43

## 2023-05-08 RX ADMIN — SODIUM CHLORIDE: 9 INJECTION, SOLUTION INTRAVENOUS at 22:43

## 2023-05-08 RX ADMIN — MORPHINE SULFATE 4 MG: 4 INJECTION, SOLUTION INTRAMUSCULAR; INTRAVENOUS at 19:52

## 2023-05-08 RX ADMIN — MONTELUKAST 10 MG: 10 TABLET, FILM COATED ORAL at 22:43

## 2023-05-08 RX ADMIN — KETOROLAC TROMETHAMINE 15 MG: 30 INJECTION, SOLUTION INTRAMUSCULAR at 17:16

## 2023-05-08 RX ADMIN — SODIUM CHLORIDE 250 ML: 9 INJECTION, SOLUTION INTRAVENOUS at 17:12

## 2023-05-08 RX ADMIN — CEFTRIAXONE SODIUM 1000 MG: 1 INJECTION, POWDER, FOR SOLUTION INTRAMUSCULAR; INTRAVENOUS at 19:19

## 2023-05-08 RX ADMIN — IOPAMIDOL 75 ML: 755 INJECTION, SOLUTION INTRAVENOUS at 18:04

## 2023-05-08 RX ADMIN — FAMOTIDINE 20 MG: 10 INJECTION, SOLUTION INTRAVENOUS at 17:16

## 2023-05-08 RX ADMIN — ONDANSETRON 4 MG: 2 INJECTION INTRAMUSCULAR; INTRAVENOUS at 17:16

## 2023-05-08 ASSESSMENT — PAIN DESCRIPTION - LOCATION
LOCATION: ABDOMEN

## 2023-05-08 ASSESSMENT — ENCOUNTER SYMPTOMS
SINUS PAIN: 0
DIARRHEA: 0
SHORTNESS OF BREATH: 0
ABDOMINAL DISTENTION: 1
EYE REDNESS: 0
COUGH: 0
CONSTIPATION: 0
SINUS PRESSURE: 0
ABDOMINAL PAIN: 1
EYE DISCHARGE: 0
RHINORRHEA: 0
CHEST TIGHTNESS: 0
SORE THROAT: 0
NAUSEA: 0
VOMITING: 0

## 2023-05-08 ASSESSMENT — PAIN SCALES - GENERAL
PAINLEVEL_OUTOF10: 7
PAINLEVEL_OUTOF10: 5
PAINLEVEL_OUTOF10: 10
PAINLEVEL_OUTOF10: 10

## 2023-05-08 ASSESSMENT — PAIN - FUNCTIONAL ASSESSMENT: PAIN_FUNCTIONAL_ASSESSMENT: 0-10

## 2023-05-09 VITALS
WEIGHT: 160 LBS | HEART RATE: 58 BPM | HEIGHT: 64 IN | SYSTOLIC BLOOD PRESSURE: 112 MMHG | OXYGEN SATURATION: 95 % | TEMPERATURE: 98 F | BODY MASS INDEX: 27.31 KG/M2 | RESPIRATION RATE: 18 BRPM | DIASTOLIC BLOOD PRESSURE: 61 MMHG

## 2023-05-09 LAB
BACTERIA UR CULT: NORMAL
BASOPHILS # BLD: 0 K/UL (ref 0–0.2)
BASOPHILS NFR BLD: 0.2 %
DEPRECATED RDW RBC AUTO: 23.3 % (ref 12.4–15.4)
EOSINOPHIL # BLD: 0.2 K/UL (ref 0–0.6)
EOSINOPHIL NFR BLD: 3.5 %
EST. AVERAGE GLUCOSE BLD GHB EST-MCNC: 102.5 MG/DL
GLUCOSE BLD-MCNC: 137 MG/DL (ref 70–99)
GLUCOSE BLD-MCNC: 85 MG/DL (ref 70–99)
HBA1C MFR BLD: 5.2 %
HCT VFR BLD AUTO: 30.6 % (ref 36–48)
HGB BLD-MCNC: 9.3 G/DL (ref 12–16)
LYMPHOCYTES # BLD: 1.6 K/UL (ref 1–5.1)
LYMPHOCYTES NFR BLD: 22.9 %
MCH RBC QN AUTO: 24.1 PG (ref 26–34)
MCHC RBC AUTO-ENTMCNC: 30.4 G/DL (ref 31–36)
MCV RBC AUTO: 79.3 FL (ref 80–100)
MONOCYTES # BLD: 0.8 K/UL (ref 0–1.3)
MONOCYTES NFR BLD: 11.3 %
NEUTROPHILS # BLD: 4.3 K/UL (ref 1.7–7.7)
NEUTROPHILS NFR BLD: 62.1 %
PERFORMED ON: ABNORMAL
PERFORMED ON: NORMAL
PLATELET # BLD AUTO: 287 K/UL (ref 135–450)
PMV BLD AUTO: 7.5 FL (ref 5–10.5)
RBC # BLD AUTO: 3.86 M/UL (ref 4–5.2)
WBC # BLD AUTO: 6.9 K/UL (ref 4–11)

## 2023-05-09 PROCEDURE — 6360000002 HC RX W HCPCS: Performed by: INTERNAL MEDICINE

## 2023-05-09 PROCEDURE — 6370000000 HC RX 637 (ALT 250 FOR IP): Performed by: INTERNAL MEDICINE

## 2023-05-09 PROCEDURE — 36415 COLL VENOUS BLD VENIPUNCTURE: CPT

## 2023-05-09 PROCEDURE — 85025 COMPLETE CBC W/AUTO DIFF WBC: CPT

## 2023-05-09 PROCEDURE — 2580000003 HC RX 258: Performed by: INTERNAL MEDICINE

## 2023-05-09 RX ORDER — OXYCODONE HYDROCHLORIDE 5 MG/1
5 TABLET ORAL EVERY 6 HOURS PRN
Status: DISCONTINUED | OUTPATIENT
Start: 2023-05-09 | End: 2023-05-09 | Stop reason: HOSPADM

## 2023-05-09 RX ORDER — CEPHALEXIN 500 MG/1
500 CAPSULE ORAL 4 TIMES DAILY
Qty: 16 CAPSULE | Refills: 0 | Status: SHIPPED | OUTPATIENT
Start: 2023-05-09 | End: 2023-05-13

## 2023-05-09 RX ADMIN — HYDROCHLOROTHIAZIDE 25 MG: 25 TABLET ORAL at 08:56

## 2023-05-09 RX ADMIN — ENOXAPARIN SODIUM 40 MG: 100 INJECTION SUBCUTANEOUS at 08:56

## 2023-05-09 RX ADMIN — ASPIRIN 81 MG: 81 TABLET, COATED ORAL at 08:56

## 2023-05-09 RX ADMIN — ATORVASTATIN CALCIUM 10 MG: 10 TABLET, FILM COATED ORAL at 08:57

## 2023-05-09 RX ADMIN — HYDRALAZINE HYDROCHLORIDE 25 MG: 25 TABLET, FILM COATED ORAL at 14:45

## 2023-05-09 RX ADMIN — CEFTRIAXONE SODIUM 1000 MG: 1 INJECTION, POWDER, FOR SOLUTION INTRAMUSCULAR; INTRAVENOUS at 12:22

## 2023-05-09 RX ADMIN — ESCITALOPRAM OXALATE 20 MG: 10 TABLET ORAL at 08:56

## 2023-05-09 RX ADMIN — ATENOLOL 75 MG: 25 TABLET ORAL at 08:56

## 2023-05-09 RX ADMIN — SPIRONOLACTONE 25 MG: 25 TABLET ORAL at 08:56

## 2023-05-09 RX ADMIN — CEFTRIAXONE SODIUM 1000 MG: 1 INJECTION, POWDER, FOR SOLUTION INTRAMUSCULAR; INTRAVENOUS at 01:12

## 2023-05-09 RX ADMIN — PANTOPRAZOLE SODIUM 40 MG: 40 TABLET, DELAYED RELEASE ORAL at 08:56

## 2023-05-09 NOTE — ED PROVIDER NOTES
I independently examined and evaluated Yao Newell. In brief, patient is a 51-year-old female with history of bladder cancer, diabetes who presents to the emergency department for evaluation of abdominal pain. On my assessment, patient appears uncomfortable. She is afebrile. Abdomen soft, nondistended, and mildly tender to palpation throughout all 4 quadrants. Urinalysis indicative of UTI with moderate leuk esterase, 10-20 WBC, and rare bacteria. Lactic within normal limits at 1.9. No leukocytosis. Patient requiring IV pain medications for pain control. She was given Rocephin for the complicated UTI. Hospitalist consulted for admission for further evaluation and treatment. Admit. All diagnostic, treatment, and disposition decisions were made by myself in conjunction with the advanced practice provider/resident physician. I personally saw the patient and performed a substantive portion of the visit including aspects of the medical decision making. Comment: Please note this report has been produced using speech recognition software and may contain errors related to that system including errors in grammar, punctuation, and spelling, as well as words and phrases that may be inappropriate. If there are any questions or concerns please feel free to contact the dictating provider for clarification. For all further details of the patient's emergency department visit, please see the advanced practice provider's documentation.        Carmela Griffiths MD  05/08/23 5618
Result   1. No acute findings within the abdomen or pelvis. In particular, no acute   bowel pathology. Previous small bowel resection with no acute features. Apparent resection of mass within the mesentery noted on previous imaging. 2. Status post cholecystectomy and hysterectomy. 3. Moderate-sized hiatal hernia. CRITICAL CARE TIME (.cctime)   Because of high probability of sudden clinical deterioration of the patient's condition and risk of further deterioration, critical care time required my full attention to the patient's condition; which included chart data review, documentation, medication ordering, reviewing the patient's old records, reevaluation patient's cardiac, pulmonary and neurological status. Reevaluation of vital signs. Consultations with ED attending and admitting physician. Ordering, interpreting reviewing diagnostic testing. Therefore, I personally saw the patient and independently provided 32 minutes of non-concurrent critical care out of the total shared critical care time provided, direct attention to the patient's condition did not include time spent on procedures. PAST MEDICAL HISTORY      has a past medical history of Cancer ([facility] CHF (congestive heart failure) ([facility] Diabetes mellitus ([facility] Hyperlipidemia, Hypertension, Seasonal allergies, and Thyroid disease.      Chronic Conditions affecting Care: above    EMERGENCY DEPARTMENT COURSE and DIFFERENTIAL DIAGNOSIS/MDM:   Vitals:    Vitals:    05/08/23 1919 05/08/23 2137 05/08/23 2241 05/09/23 0845   BP: (!) 136/48 (!) 117/46 (!) 137/54 112/61   Pulse: 54 51 51 58   Resp: 18 18 18 18   Temp:   97.3 °F (36.3 °C) 98 °F (36.7 °C)   TempSrc:   Axillary Oral   SpO2: 100% 100% 93% 95%   Weight:       Height:           Patient was given the following medications:  Medications   0.9 % sodium chloride bolus (0 mLs IntraVENous Stopped 5/8/23 1912)   ondansetron (ZOFRAN) injection 4 mg (4 mg IntraVENous Given 5/8/23 1716)

## 2023-05-09 NOTE — H&P
1.1   GLUCOSE 115*     Troponin: No results for input(s): TROPONINI in the last 72 hours. PT/INR:  No results found for: PTINR  U/A:    Lab Results   Component Value Date/Time    LEUKOCYTESUR MODERATE 05/08/2023 05:54 PM    RBCUA 0-2 05/08/2023 05:54 PM    SPECGRAV 1.025 05/08/2023 05:54 PM    UROBILINOGEN 0.2 05/08/2023 05:54 PM    BILIRUBINUR Negative 05/08/2023 05:54 PM    BLOODU Negative 05/08/2023 05:54 PM    GLUCOSEU Negative 05/08/2023 05:54 PM    PROTEINU Negative 05/08/2023 05:54 PM         RAD:   I have independently reviewed and interpreted the imaging studies below and based my recommendations to the patient on those findings. CT ABDOMEN PELVIS W IV CONTRAST Additional Contrast? None    Result Date: 5/8/2023  EXAMINATION: CT OF THE ABDOMEN AND PELVIS WITH CONTRAST 5/8/2023 6:01 pm TECHNIQUE: CT of the abdomen and pelvis was performed with the administration of intravenous contrast. Multiplanar reformatted images are provided for review. Automated exposure control, iterative reconstruction, and/or weight based adjustment of the mA/kV was utilized to reduce the radiation dose to as low as reasonably achievable. COMPARISON: 10/25/2022. HISTORY: ORDERING SYSTEM PROVIDED HISTORY: abd pain, epigastric, N/V/D h/o SBO TECHNOLOGIST PROVIDED HISTORY: Additional Contrast?->None Reason for exam:->abd pain, epigastric, N/V/D h/o SBO Decision Support Exception - unselect if not a suspected or confirmed emergency medical condition->Emergency Medical Condition (MA) Reason for Exam: some vomiting today and epigastric and back pain since Wednesday Relevant Medical/Surgical History: Choly, Hysterectomy FINDINGS: Lower Chest: No acute infiltrate at the lung bases. Moderate-sized hiatal hernia. Organs: The liver, spleen, pancreas and adrenal glands are unremarkable. Previous cholecystectomy with no significant biliary dilatation. No cystic or solid renal mass or significant hydronephrosis. GI/Bowel:  The distal stomach

## 2023-05-09 NOTE — DISCHARGE SUMMARY
V2.0  Discharge Summary    Name:  Rama Tapia /Age/Sex: 1940 (82 y.o. female)   Admit Date: 2023  Discharge Date: 23    MRN & CSN:  [id number] & [id number] Encounter Date and Time 23 3:10 PM EDT    Attending:  Sadia Richey MD Discharging Provider: Sadia Richey MD       Hospital Course:     Brief HPI: Rama Tapia is a 82 y.o. female who presented with abdominal pain and now left flank pain. She states that her abdominal pain is generalized, but worse in the periumbilical and lower abdomen area. Her pain is burning in nature and radiates to her right flank. In the emergency room she was found to have a urinary tract infection and clinically appears to have pyelonephritis. Associated signs and symptoms do not include fever or chills, nausea, vomiting, abdominal pain, hemoptysis, hematochezia, diarrhea or constipation. She is being admitted for further evaluation and treatment    Brief Problem Based Course:     Abdominal pain  - no clear etiology  - CT abd/pel negative for acute pathology but numerous prior abdominal surgeries  - currently resolved and tolerating PO well    UTI  - noted on UA  - unlikely pyelonephritis  - continue course of keflex    DMII  - well controlled  - resume home regimen on discharge    HTN  - well controlled  - continue lisinopril, HCTZ, aldactone, atenolol, hydralazine    HLD  - continue statin    Hypothyroidism  - continue synthroid    The patient expressed appropriate understanding of, and agreement with the discharge recommendations, medications, and plan.      Consults this admission:  None    Discharge Diagnosis:     Abdominal pain    Discharge Instruction:   Follow up appointments: PCP  Primary care physician: Dominick Randhawa MD within 2 weeks  Diet: regular diet   Activity: activity as tolerated  Disposition: Discharged to:   [x]Home, []C, []SNF, []Acute Rehab, []Hospice   Condition on discharge: Stable  Labs and Tests to be Followed up

## 2023-05-12 LAB
BACTERIA BLD CULT ORG #2: NORMAL
BACTERIA BLD CULT: NORMAL

## 2023-07-06 ENCOUNTER — APPOINTMENT (OUTPATIENT)
Dept: GENERAL RADIOLOGY | Age: 83
End: 2023-07-06
Payer: MEDICARE

## 2023-07-06 ENCOUNTER — APPOINTMENT (OUTPATIENT)
Dept: CT IMAGING | Age: 83
End: 2023-07-06
Payer: MEDICARE

## 2023-07-06 ENCOUNTER — HOSPITAL ENCOUNTER (OUTPATIENT)
Age: 83
Setting detail: OBSERVATION
Discharge: HOME | End: 2023-07-07
Attending: STUDENT IN AN ORGANIZED HEALTH CARE EDUCATION/TRAINING PROGRAM | Admitting: INTERNAL MEDICINE
Payer: MEDICARE

## 2023-07-06 DIAGNOSIS — E86.0 DEHYDRATION: ICD-10-CM

## 2023-07-06 DIAGNOSIS — R11.2 NAUSEA AND VOMITING, UNSPECIFIED VOMITING TYPE: Primary | ICD-10-CM

## 2023-07-06 DIAGNOSIS — E83.42 HYPOMAGNESEMIA: ICD-10-CM

## 2023-07-06 DIAGNOSIS — R09.02 HYPOXIA: ICD-10-CM

## 2023-07-06 DIAGNOSIS — E87.1 HYPONATREMIA: ICD-10-CM

## 2023-07-06 LAB
ALBUMIN SERPL-MCNC: 4.7 G/DL (ref 3.4–5)
ALBUMIN/GLOB SERPL: 2.9 {RATIO} (ref 1.1–2.2)
ALP SERPL-CCNC: 44 U/L (ref 40–129)
ALT SERPL-CCNC: 12 U/L (ref 10–40)
ANION GAP SERPL CALCULATED.3IONS-SCNC: 16 MMOL/L (ref 3–16)
AST SERPL-CCNC: 13 U/L (ref 15–37)
BASE EXCESS BLDV CALC-SCNC: 2.2 MMOL/L (ref -3–3)
BASOPHILS # BLD: 0 K/UL (ref 0–0.2)
BASOPHILS NFR BLD: 0 %
BILIRUB SERPL-MCNC: 1 MG/DL (ref 0–1)
BILIRUB UR QL STRIP.AUTO: NEGATIVE
BUN SERPL-MCNC: 18 MG/DL (ref 7–20)
CALCIUM SERPL-MCNC: 10.6 MG/DL (ref 8.3–10.6)
CHLORIDE SERPL-SCNC: 85 MMOL/L (ref 99–110)
CK SERPL-CCNC: 42 U/L (ref 26–192)
CLARITY UR: CLEAR
CO2 BLDV-SCNC: 26 MMOL/L
CO2 SERPL-SCNC: 24 MMOL/L (ref 21–32)
COHGB MFR BLDV: 1.3 % (ref 0–1.5)
COLOR UR: ABNORMAL
CREAT SERPL-MCNC: 1 MG/DL (ref 0.6–1.2)
CREAT UR-MCNC: 30.6 MG/DL (ref 28–259)
DEPRECATED RDW RBC AUTO: 23.1 % (ref 12.4–15.4)
EOSINOPHIL # BLD: 0 K/UL (ref 0–0.6)
EOSINOPHIL NFR BLD: 0 %
GFR SERPLBLD CREATININE-BSD FMLA CKD-EPI: 56 ML/MIN/{1.73_M2}
GLUCOSE BLD-MCNC: 85 MG/DL (ref 70–99)
GLUCOSE SERPL-MCNC: 124 MG/DL (ref 70–99)
GLUCOSE UR STRIP.AUTO-MCNC: NEGATIVE MG/DL
HCO3 BLDV-SCNC: 25.2 MMOL/L (ref 23–29)
HCT VFR BLD AUTO: 41.9 % (ref 36–48)
HGB BLD-MCNC: 13.6 G/DL (ref 12–16)
HGB UR QL STRIP.AUTO: NEGATIVE
KETONES UR STRIP.AUTO-MCNC: ABNORMAL MG/DL
LACTATE BLDV-SCNC: 1.1 MMOL/L (ref 0.4–1.9)
LEUKOCYTE ESTERASE UR QL STRIP.AUTO: NEGATIVE
LIPASE SERPL-CCNC: 24 U/L (ref 13–60)
LYMPHOCYTES # BLD: 1.6 K/UL (ref 1–5.1)
LYMPHOCYTES NFR BLD: 12 %
MAGNESIUM SERPL-MCNC: 1.4 MG/DL (ref 1.8–2.4)
MCH RBC QN AUTO: 27.6 PG (ref 26–34)
MCHC RBC AUTO-ENTMCNC: 32.4 G/DL (ref 31–36)
MCV RBC AUTO: 85.1 FL (ref 80–100)
METHGB MFR BLDV: 0.3 %
MONOCYTES # BLD: 1 K/UL (ref 0–1.3)
MONOCYTES NFR BLD: 8 %
NEUTROPHILS # BLD: 10.5 K/UL (ref 1.7–7.7)
NEUTROPHILS NFR BLD: 80 %
NITRITE UR QL STRIP.AUTO: NEGATIVE
NT-PROBNP SERPL-MCNC: 730 PG/ML (ref 0–449)
O2 THERAPY: ABNORMAL
PCO2 BLDV: 34.3 MMHG (ref 40–50)
PERFORMED ON: NORMAL
PH BLDV: 7.48 [PH] (ref 7.35–7.45)
PH UR STRIP.AUTO: 6 [PH] (ref 5–8)
PHOSPHATE SERPL-MCNC: 4.2 MG/DL (ref 2.5–4.9)
PLATELET # BLD AUTO: 285 K/UL (ref 135–450)
PMV BLD AUTO: 8 FL (ref 5–10.5)
PO2 BLDV: 83.2 MMHG (ref 25–40)
POTASSIUM SERPL-SCNC: 4.1 MMOL/L (ref 3.5–5.1)
PROT SERPL-MCNC: 6.3 G/DL (ref 6.4–8.2)
PROT UR STRIP.AUTO-MCNC: NEGATIVE MG/DL
RBC # BLD AUTO: 4.92 M/UL (ref 4–5.2)
SAO2 % BLDV: 96 %
SODIUM SERPL-SCNC: 125 MMOL/L (ref 136–145)
SODIUM UR-SCNC: 43 MMOL/L
SP GR UR STRIP.AUTO: <=1.005 (ref 1–1.03)
TROPONIN, HIGH SENSITIVITY: 14 NG/L (ref 0–14)
UA COMPLETE W REFLEX CULTURE PNL UR: ABNORMAL
UA DIPSTICK W REFLEX MICRO PNL UR: ABNORMAL
URN SPEC COLLECT METH UR: ABNORMAL
UROBILINOGEN UR STRIP-ACNC: 0.2 E.U./DL
WBC # BLD AUTO: 13.1 K/UL (ref 4–11)

## 2023-07-06 PROCEDURE — 6360000002 HC RX W HCPCS: Performed by: PHYSICIAN ASSISTANT

## 2023-07-06 PROCEDURE — 84100 ASSAY OF PHOSPHORUS: CPT

## 2023-07-06 PROCEDURE — 85025 COMPLETE CBC W/AUTO DIFF WBC: CPT

## 2023-07-06 PROCEDURE — 6370000000 HC RX 637 (ALT 250 FOR IP): Performed by: NURSE PRACTITIONER

## 2023-07-06 PROCEDURE — 84300 ASSAY OF URINE SODIUM: CPT

## 2023-07-06 PROCEDURE — G0378 HOSPITAL OBSERVATION PER HR: HCPCS

## 2023-07-06 PROCEDURE — 83735 ASSAY OF MAGNESIUM: CPT

## 2023-07-06 PROCEDURE — 6360000004 HC RX CONTRAST MEDICATION: Performed by: PHYSICIAN ASSISTANT

## 2023-07-06 PROCEDURE — 96361 HYDRATE IV INFUSION ADD-ON: CPT

## 2023-07-06 PROCEDURE — 83605 ASSAY OF LACTIC ACID: CPT

## 2023-07-06 PROCEDURE — 93005 ELECTROCARDIOGRAM TRACING: CPT | Performed by: STUDENT IN AN ORGANIZED HEALTH CARE EDUCATION/TRAINING PROGRAM

## 2023-07-06 PROCEDURE — 96365 THER/PROPH/DIAG IV INF INIT: CPT

## 2023-07-06 PROCEDURE — 71260 CT THORAX DX C+: CPT

## 2023-07-06 PROCEDURE — 84484 ASSAY OF TROPONIN QUANT: CPT

## 2023-07-06 PROCEDURE — 81003 URINALYSIS AUTO W/O SCOPE: CPT

## 2023-07-06 PROCEDURE — 2580000003 HC RX 258: Performed by: PHYSICIAN ASSISTANT

## 2023-07-06 PROCEDURE — 84540 ASSAY OF URINE/UREA-N: CPT

## 2023-07-06 PROCEDURE — 80053 COMPREHEN METABOLIC PANEL: CPT

## 2023-07-06 PROCEDURE — 71045 X-RAY EXAM CHEST 1 VIEW: CPT

## 2023-07-06 PROCEDURE — 82550 ASSAY OF CK (CPK): CPT

## 2023-07-06 PROCEDURE — 84439 ASSAY OF FREE THYROXINE: CPT

## 2023-07-06 PROCEDURE — 96375 TX/PRO/DX INJ NEW DRUG ADDON: CPT

## 2023-07-06 PROCEDURE — 83690 ASSAY OF LIPASE: CPT

## 2023-07-06 PROCEDURE — 96366 THER/PROPH/DIAG IV INF ADDON: CPT

## 2023-07-06 PROCEDURE — 84443 ASSAY THYROID STIM HORMONE: CPT

## 2023-07-06 PROCEDURE — 1200000000 HC SEMI PRIVATE

## 2023-07-06 PROCEDURE — 82570 ASSAY OF URINE CREATININE: CPT

## 2023-07-06 PROCEDURE — 99285 EMERGENCY DEPT VISIT HI MDM: CPT

## 2023-07-06 PROCEDURE — 82803 BLOOD GASES ANY COMBINATION: CPT

## 2023-07-06 PROCEDURE — 83880 ASSAY OF NATRIURETIC PEPTIDE: CPT

## 2023-07-06 RX ORDER — ACETAMINOPHEN 325 MG/1
650 TABLET ORAL EVERY 6 HOURS PRN
Status: DISCONTINUED | OUTPATIENT
Start: 2023-07-06 | End: 2023-07-07 | Stop reason: HOSPADM

## 2023-07-06 RX ORDER — DEXTROSE MONOHYDRATE 100 MG/ML
INJECTION, SOLUTION INTRAVENOUS CONTINUOUS PRN
Status: DISCONTINUED | OUTPATIENT
Start: 2023-07-06 | End: 2023-07-07 | Stop reason: HOSPADM

## 2023-07-06 RX ORDER — ESCITALOPRAM OXALATE 10 MG/1
20 TABLET ORAL DAILY
Status: DISCONTINUED | OUTPATIENT
Start: 2023-07-07 | End: 2023-07-07 | Stop reason: HOSPADM

## 2023-07-06 RX ORDER — ATENOLOL 25 MG/1
75 TABLET ORAL DAILY
Status: DISCONTINUED | OUTPATIENT
Start: 2023-07-07 | End: 2023-07-07 | Stop reason: HOSPADM

## 2023-07-06 RX ORDER — POLYETHYLENE GLYCOL 3350 17 G/17G
17 POWDER, FOR SOLUTION ORAL DAILY PRN
Status: DISCONTINUED | OUTPATIENT
Start: 2023-07-06 | End: 2023-07-07 | Stop reason: HOSPADM

## 2023-07-06 RX ORDER — HYDRALAZINE HYDROCHLORIDE 25 MG/1
25 TABLET, FILM COATED ORAL EVERY 8 HOURS SCHEDULED
Status: DISCONTINUED | OUTPATIENT
Start: 2023-07-06 | End: 2023-07-07 | Stop reason: HOSPADM

## 2023-07-06 RX ORDER — SODIUM CHLORIDE 0.9 % (FLUSH) 0.9 %
5-40 SYRINGE (ML) INJECTION PRN
Status: DISCONTINUED | OUTPATIENT
Start: 2023-07-06 | End: 2023-07-07 | Stop reason: HOSPADM

## 2023-07-06 RX ORDER — ONDANSETRON 2 MG/ML
4 INJECTION INTRAMUSCULAR; INTRAVENOUS ONCE
Status: COMPLETED | OUTPATIENT
Start: 2023-07-06 | End: 2023-07-06

## 2023-07-06 RX ORDER — ACETAMINOPHEN 650 MG/1
650 SUPPOSITORY RECTAL EVERY 6 HOURS PRN
Status: DISCONTINUED | OUTPATIENT
Start: 2023-07-06 | End: 2023-07-07 | Stop reason: HOSPADM

## 2023-07-06 RX ORDER — SODIUM CHLORIDE 9 MG/ML
INJECTION, SOLUTION INTRAVENOUS PRN
Status: DISCONTINUED | OUTPATIENT
Start: 2023-07-06 | End: 2023-07-07 | Stop reason: HOSPADM

## 2023-07-06 RX ORDER — INSULIN LISPRO 100 [IU]/ML
0-4 INJECTION, SOLUTION INTRAVENOUS; SUBCUTANEOUS
Status: DISCONTINUED | OUTPATIENT
Start: 2023-07-07 | End: 2023-07-07 | Stop reason: HOSPADM

## 2023-07-06 RX ORDER — MAGNESIUM SULFATE IN WATER 40 MG/ML
2000 INJECTION, SOLUTION INTRAVENOUS ONCE
Status: COMPLETED | OUTPATIENT
Start: 2023-07-06 | End: 2023-07-06

## 2023-07-06 RX ORDER — LEVOTHYROXINE SODIUM 0.1 MG/1
100 TABLET ORAL DAILY
Status: DISCONTINUED | OUTPATIENT
Start: 2023-07-07 | End: 2023-07-07 | Stop reason: HOSPADM

## 2023-07-06 RX ORDER — SODIUM CHLORIDE 0.9 % (FLUSH) 0.9 %
5-40 SYRINGE (ML) INJECTION EVERY 12 HOURS SCHEDULED
Status: DISCONTINUED | OUTPATIENT
Start: 2023-07-06 | End: 2023-07-07 | Stop reason: HOSPADM

## 2023-07-06 RX ORDER — ATORVASTATIN CALCIUM 10 MG/1
20 TABLET, FILM COATED ORAL DAILY
Status: DISCONTINUED | OUTPATIENT
Start: 2023-07-07 | End: 2023-07-07 | Stop reason: HOSPADM

## 2023-07-06 RX ORDER — SODIUM CHLORIDE 9 MG/ML
1000 INJECTION, SOLUTION INTRAVENOUS CONTINUOUS
Status: DISCONTINUED | OUTPATIENT
Start: 2023-07-06 | End: 2023-07-07

## 2023-07-06 RX ORDER — ASPIRIN 81 MG/1
81 TABLET ORAL DAILY
Status: DISCONTINUED | OUTPATIENT
Start: 2023-07-07 | End: 2023-07-07 | Stop reason: HOSPADM

## 2023-07-06 RX ORDER — ENOXAPARIN SODIUM 100 MG/ML
40 INJECTION SUBCUTANEOUS DAILY
Status: DISCONTINUED | OUTPATIENT
Start: 2023-07-07 | End: 2023-07-07 | Stop reason: HOSPADM

## 2023-07-06 RX ORDER — LISINOPRIL 20 MG/1
40 TABLET ORAL NIGHTLY
Status: DISCONTINUED | OUTPATIENT
Start: 2023-07-06 | End: 2023-07-07 | Stop reason: HOSPADM

## 2023-07-06 RX ORDER — INSULIN LISPRO 100 [IU]/ML
0-4 INJECTION, SOLUTION INTRAVENOUS; SUBCUTANEOUS NIGHTLY
Status: DISCONTINUED | OUTPATIENT
Start: 2023-07-06 | End: 2023-07-07 | Stop reason: HOSPADM

## 2023-07-06 RX ORDER — ONDANSETRON 4 MG/1
4 TABLET, ORALLY DISINTEGRATING ORAL EVERY 8 HOURS PRN
Status: DISCONTINUED | OUTPATIENT
Start: 2023-07-06 | End: 2023-07-07 | Stop reason: HOSPADM

## 2023-07-06 RX ORDER — ONDANSETRON 2 MG/ML
4 INJECTION INTRAMUSCULAR; INTRAVENOUS EVERY 6 HOURS PRN
Status: DISCONTINUED | OUTPATIENT
Start: 2023-07-06 | End: 2023-07-07 | Stop reason: HOSPADM

## 2023-07-06 RX ADMIN — MAGNESIUM SULFATE HEPTAHYDRATE 2000 MG: 40 INJECTION, SOLUTION INTRAVENOUS at 17:42

## 2023-07-06 RX ADMIN — IOPAMIDOL 75 ML: 755 INJECTION, SOLUTION INTRAVENOUS at 17:51

## 2023-07-06 RX ADMIN — HYDRALAZINE HYDROCHLORIDE 25 MG: 25 TABLET, FILM COATED ORAL at 22:45

## 2023-07-06 RX ADMIN — LISINOPRIL 40 MG: 20 TABLET ORAL at 22:45

## 2023-07-06 RX ADMIN — SODIUM CHLORIDE 1000 ML: 9 INJECTION, SOLUTION INTRAVENOUS at 17:57

## 2023-07-06 RX ADMIN — ONDANSETRON 4 MG: 2 INJECTION INTRAMUSCULAR; INTRAVENOUS at 16:53

## 2023-07-06 ASSESSMENT — PAIN - FUNCTIONAL ASSESSMENT: PAIN_FUNCTIONAL_ASSESSMENT: 0-10

## 2023-07-06 ASSESSMENT — PAIN SCALES - GENERAL: PAINLEVEL_OUTOF10: 0

## 2023-07-07 VITALS
RESPIRATION RATE: 16 BRPM | OXYGEN SATURATION: 96 % | TEMPERATURE: 98 F | BODY MASS INDEX: 26.12 KG/M2 | HEART RATE: 52 BPM | HEIGHT: 64 IN | DIASTOLIC BLOOD PRESSURE: 67 MMHG | WEIGHT: 153 LBS | SYSTOLIC BLOOD PRESSURE: 125 MMHG

## 2023-07-07 PROBLEM — R11.2 NAUSEA WITH VOMITING: Status: RESOLVED | Noted: 2023-07-06 | Resolved: 2023-07-07

## 2023-07-07 PROBLEM — N39.0 UTI (URINARY TRACT INFECTION): Status: ACTIVE | Noted: 2023-07-07

## 2023-07-07 PROBLEM — E83.42 HYPOMAGNESEMIA: Status: ACTIVE | Noted: 2023-07-07

## 2023-07-07 PROBLEM — E87.1 HYPONATREMIA: Status: ACTIVE | Noted: 2023-07-07

## 2023-07-07 LAB
ANION GAP SERPL CALCULATED.3IONS-SCNC: 9 MMOL/L (ref 3–16)
BASOPHILS # BLD: 0 K/UL (ref 0–0.2)
BASOPHILS NFR BLD: 0.3 %
BUN SERPL-MCNC: 14 MG/DL (ref 7–20)
CALCIUM SERPL-MCNC: 8.9 MG/DL (ref 8.3–10.6)
CHLORIDE SERPL-SCNC: 97 MMOL/L (ref 99–110)
CO2 SERPL-SCNC: 25 MMOL/L (ref 21–32)
CREAT SERPL-MCNC: 0.8 MG/DL (ref 0.6–1.2)
DEPRECATED RDW RBC AUTO: 23.2 % (ref 12.4–15.4)
EKG ATRIAL RATE: 52 BPM
EKG DIAGNOSIS: NORMAL
EKG P AXIS: 44 DEGREES
EKG P-R INTERVAL: 220 MS
EKG Q-T INTERVAL: 500 MS
EKG QRS DURATION: 94 MS
EKG QTC CALCULATION (BAZETT): 465 MS
EKG R AXIS: -40 DEGREES
EKG T AXIS: -23 DEGREES
EKG VENTRICULAR RATE: 52 BPM
EOSINOPHIL # BLD: 0.2 K/UL (ref 0–0.6)
EOSINOPHIL NFR BLD: 2 %
GFR SERPLBLD CREATININE-BSD FMLA CKD-EPI: >60 ML/MIN/{1.73_M2}
GLUCOSE BLD-MCNC: 100 MG/DL (ref 70–99)
GLUCOSE BLD-MCNC: 111 MG/DL (ref 70–99)
GLUCOSE BLD-MCNC: 96 MG/DL (ref 70–99)
GLUCOSE SERPL-MCNC: 82 MG/DL (ref 70–99)
HCT VFR BLD AUTO: 36.8 % (ref 36–48)
HGB BLD-MCNC: 11.9 G/DL (ref 12–16)
LYMPHOCYTES # BLD: 1.3 K/UL (ref 1–5.1)
LYMPHOCYTES NFR BLD: 14.1 %
MAGNESIUM SERPL-MCNC: 2 MG/DL (ref 1.8–2.4)
MCH RBC QN AUTO: 27.6 PG (ref 26–34)
MCHC RBC AUTO-ENTMCNC: 32.3 G/DL (ref 31–36)
MCV RBC AUTO: 85.2 FL (ref 80–100)
MONOCYTES # BLD: 0.9 K/UL (ref 0–1.3)
MONOCYTES NFR BLD: 10.4 %
NEUTROPHILS # BLD: 6.7 K/UL (ref 1.7–7.7)
NEUTROPHILS NFR BLD: 73.2 %
PERFORMED ON: ABNORMAL
PERFORMED ON: ABNORMAL
PERFORMED ON: NORMAL
PLATELET # BLD AUTO: 218 K/UL (ref 135–450)
PLATELET BLD QL SMEAR: ADEQUATE
PMV BLD AUTO: 8 FL (ref 5–10.5)
POIKILOCYTOSIS BLD QL SMEAR: ABNORMAL
POTASSIUM SERPL-SCNC: 3.4 MMOL/L (ref 3.5–5.1)
RBC # BLD AUTO: 4.31 M/UL (ref 4–5.2)
SLIDE REVIEW: ABNORMAL
SODIUM SERPL-SCNC: 131 MMOL/L (ref 136–145)
T4 FREE SERPL-MCNC: 1.7 NG/DL (ref 0.9–1.8)
TSH SERPL DL<=0.005 MIU/L-ACNC: 9.66 UIU/ML (ref 0.27–4.2)
UUN UR-MCNC: 172.9 MG/DL (ref 800–1666)
WBC # BLD AUTO: 9.1 K/UL (ref 4–11)

## 2023-07-07 PROCEDURE — 6370000000 HC RX 637 (ALT 250 FOR IP)

## 2023-07-07 PROCEDURE — G0378 HOSPITAL OBSERVATION PER HR: HCPCS

## 2023-07-07 PROCEDURE — 83735 ASSAY OF MAGNESIUM: CPT

## 2023-07-07 PROCEDURE — 6370000000 HC RX 637 (ALT 250 FOR IP): Performed by: NURSE PRACTITIONER

## 2023-07-07 PROCEDURE — 2580000003 HC RX 258: Performed by: PHYSICIAN ASSISTANT

## 2023-07-07 PROCEDURE — 96361 HYDRATE IV INFUSION ADD-ON: CPT

## 2023-07-07 PROCEDURE — 80048 BASIC METABOLIC PNL TOTAL CA: CPT

## 2023-07-07 PROCEDURE — 6360000002 HC RX W HCPCS: Performed by: INTERNAL MEDICINE

## 2023-07-07 PROCEDURE — 6360000002 HC RX W HCPCS: Performed by: NURSE PRACTITIONER

## 2023-07-07 PROCEDURE — C9113 INJ PANTOPRAZOLE SODIUM, VIA: HCPCS | Performed by: INTERNAL MEDICINE

## 2023-07-07 PROCEDURE — 96372 THER/PROPH/DIAG INJ SC/IM: CPT

## 2023-07-07 PROCEDURE — 96375 TX/PRO/DX INJ NEW DRUG ADDON: CPT

## 2023-07-07 PROCEDURE — 85025 COMPLETE CBC W/AUTO DIFF WBC: CPT

## 2023-07-07 PROCEDURE — 93010 ELECTROCARDIOGRAM REPORT: CPT | Performed by: INTERNAL MEDICINE

## 2023-07-07 RX ORDER — PANTOPRAZOLE SODIUM 40 MG/10ML
40 INJECTION, POWDER, LYOPHILIZED, FOR SOLUTION INTRAVENOUS DAILY
Status: DISCONTINUED | OUTPATIENT
Start: 2023-07-07 | End: 2023-07-07 | Stop reason: HOSPADM

## 2023-07-07 RX ORDER — NITROFURANTOIN 25; 75 MG/1; MG/1
100 CAPSULE ORAL EVERY 12 HOURS SCHEDULED
Status: DISCONTINUED | OUTPATIENT
Start: 2023-07-07 | End: 2023-07-07 | Stop reason: HOSPADM

## 2023-07-07 RX ADMIN — HYDRALAZINE HYDROCHLORIDE 25 MG: 25 TABLET, FILM COATED ORAL at 06:07

## 2023-07-07 RX ADMIN — ATORVASTATIN CALCIUM 20 MG: 10 TABLET, FILM COATED ORAL at 08:53

## 2023-07-07 RX ADMIN — NITROFURANTOIN MONOHYDRATE/MACROCRYSTALS 100 MG: 75; 25 CAPSULE ORAL at 08:54

## 2023-07-07 RX ADMIN — ENOXAPARIN SODIUM 40 MG: 100 INJECTION SUBCUTANEOUS at 08:54

## 2023-07-07 RX ADMIN — ESCITALOPRAM OXALATE 20 MG: 10 TABLET ORAL at 08:54

## 2023-07-07 RX ADMIN — PANTOPRAZOLE SODIUM 40 MG: 40 INJECTION, POWDER, FOR SOLUTION INTRAVENOUS at 10:52

## 2023-07-07 RX ADMIN — ASPIRIN 81 MG: 81 TABLET, COATED ORAL at 08:54

## 2023-07-07 RX ADMIN — POTASSIUM BICARBONATE 20 MEQ: 782 TABLET, EFFERVESCENT ORAL at 08:54

## 2023-07-07 RX ADMIN — LEVOTHYROXINE SODIUM 100 MCG: 0.1 TABLET ORAL at 08:54

## 2023-07-07 RX ADMIN — SODIUM CHLORIDE 1000 ML: 9 INJECTION, SOLUTION INTRAVENOUS at 09:00

## 2023-07-07 NOTE — PROGRESS NOTES
Pt admitted from ED for N/V, and recently diagnosed UTI. Cooperative with admission, denied nausea or pain, voiding without difficulty. Reported hunger, given crackers and water, tolerated well. Given call light. Denies needs at this time. Will continue to monitor.

## 2023-07-07 NOTE — PROGRESS NOTES
4 Eyes Skin Assessment     NAME:  Feliciano Alfaro  YOB: 1940  MEDICAL RECORD NUMBER:  [id number]    The patient is being assessed for  Admission    I agree that at least one RN has performed a thorough Head to Toe Skin Assessment on the patient. ALL assessment sites listed below have been assessed. Areas assessed by both nurses:    Head, Face, Ears, Shoulders, Back, Chest, Arms, Elbows, Hands, Sacrum. Buttock, Coccyx, Ischium, Legs. Feet and Heels, and Under Medical Devices         Does the Patient have a Wound?  No noted wound(s)       Garret Prevention initiated by RN: No  Wound Care Orders initiated by RN: No    Pressure Injury (Stage 3,4, Unstageable, DTI, NWPT, and Complex wounds) if present, place Wound referral order by RN under : No    New Ostomies, if present place, Ostomy referral order under : No     Nurse 1 eSignature: Electronically signed by Josy Hauser RN on 7/7/23 at 12:05 AM EDT    **SHARE this note so that the co-signing nurse can place an eSignature**    Nurse 2 eSignature: Electronically signed by Srinivasan Spivey RN on 7/7/23 at 4:56 AM EDT

## 2023-07-07 NOTE — CARE COORDINATION
CASE MANAGEMENT DISCHARGE SUMMARY      Discharge to: home alone. Denied any needs.     IMM given: 7/7/23    New Durable Medical Equipment ordered/agency: NONE    Transportation:    Family/car: private     Confirmed discharge plan with:     Patient: yes     Family:  no;pt will call family     RN, name: Bam Mcdonald
other family members/significant others, and if so, who? No  Plans to Return to Present Housing: Yes  Other Identified Issues/Barriers to RETURNING to current housing: NONE  Potential Assistance needed at discharge: N/A            Potential DME:    Patient expects to discharge to: [address] [place] [address] for transportation at discharge: Self    Financial    Payor: Fanny Cartwright / Plan: Jennifer Espinosa PPO / Product Type: Medicare /     Does insurance require precert for SNF: Yes    Potential assistance Purchasing Medications: No  Meds-to-Beds request: Yes      [address] [place], [place] - [address] [facility] [phone] Susanne Peraza [phone]  [address] [place] [id number]  Phone: [phone] Fax: [phone]    [address] [place] [address] [address] [place] [phone] - F [phone]  [facility] [address]  Phone: [phone] Fax: [phone]      Notes:    Factors facilitating achievement of predicted outcomes: Family support, Friend support, Motivated, Cooperative, Pleasant, and Sense of humor    Barriers to discharge: N/V    Additional Case Management Notes: a&o. Pt independent from 1 story house alone. Pt still drives and does all housework. Supportive family and [employer] friends. NO DME. Pt denies any needs. Pt followed by IM/Gi. Pt w/persistent nausea and vomiting. Gi consulted and awaiting recs. Will follow for needs as they arise. The Plan for Transition of Care is related to the following treatment goals of Nausea with vomiting [R11.2]  Dehydration [E86.0]  Hypomagnesemia [E83.42]  Hyponatremia [E87.1]  Hypoxia [R09.02]  Nausea and vomiting, unspecified vomiting type [T45.6]    IF APPLICABLE: The Patient and/or patient representative Lisa Chua and her family were provided with a choice of provider and agrees with the discharge plan.  Freedom of choice list with basic dialogue that supports the patient's individualized plan of care/goals and shares the quality data

## 2023-07-07 NOTE — PROGRESS NOTES
Pt discharged per order. Written and verbal discharge instructions provided to patient and daughter-in-law, all questions answered. IV removed without complication. Tele removed. Left floor in stable condition to home with all belongings.

## 2023-07-07 NOTE — DISCHARGE SUMMARY
V2.0  Discharge Summary    Name:  Adair White /Age/Sex: 1940 (82 y.o. female)   Admit Date: 2023  Discharge Date: 23    MRN & CSN:  [id number] & [id number] Encounter Date and Time 23 4:07 PM EDT    Attending:  Glenn Parkinson MD Discharging Provider: Heraclio Maguire DO       Hospital Course:     Brief HPI: Adair White is a 82 y.o. female with pmh of hypertension, hyperlipidemia, type 2 diabetes, and congestive heart failure who presented with Nausea with vomiting. Patient on IV fluids for dehydration, electrolytes to be replenished as needed, GI consulted for persistent nausea and vomiting and has outpatient hx workup regarding PUD and gastroparesis. Patient drastically improved overnight, with improvement in both nausea and vomiting. Eating well both breakfast and lunch. Vitals remained stable wnl. Brief Problem Based Course:   Nausea with Vomiting/  2. Hyponatremia  3. Hypokalemia  - Likely due to dehydration  - Symptoms and electrolytes improved with fluids  - Fluids discontinued, continued to monitor output and electrolytes  - GI consult placed for persistent nausea and vomiting - ok to f/u outpatient  - Obtain gastric emptying study to work up gastroparesis - will obtain as outpatient on 7/10  - Magnesium replaced in ED, NS MIVF noted  - Potassium replacement given today  - Monitor BMP, Mg daily  - Zofran as needed     2. Acute cystitis  - Present on arrival  - Continue nitrofurantoin 100 mg PO BID per home regimen, today is day 3/7, has 4 more days of Macrobid to take at home  - WBC of 9.1, no leukocytosis     3. History of CHF  - Was not in acute exacerbation  - Continued lisinopril. Atenolol, spironolactone, and hydralazine have been held due to soft BP's and bradycardia     4. Chronic sinus bradycardia  - Follows with cardiology outpatient, Dr. Saman Marr. Chart review showing patient follows annually with cardiology, and HR was in 70's at previous visit.    - Hold

## 2023-07-07 NOTE — ED PROVIDER NOTES
Attending Supervisory Note/Shared Visit     I personally saw the patient and performed a substantive portion of the visit including all aspects of the medical decision making as addressed:      ED Course as of 07/06/23 2142   Thu Jul 06, 2023   240 72-year-old female who was recently diagnosed with a UTI, placed on Macrobid who presents with worsening symptoms, nausea, vomiting, chills, tremulousness. Found to have sodium of 125, likely dehydrated, also with hypomagnesemia. Given IV fluids, magnesium IV. Given age, risk factors, requires admission. Also with 2 L nasal cannula oxygen requirement, no obvious parenchymal lesion to explain, no evidence of pulmonary edema, pulmonary emboli, may be secondary to metabolic demand. With increased diuretic, possibly component of contraction alkalosis as well as has increased bicarb in addition to the respiratory alkalosis on VBG. Admitted to hospitalist.  Tylerndell Alt up in stable condition. [ZH]      ED Course User Index  [ZH] Brandon Solis MD     Mentating well, hemodynamically stable on my assessment. Medications   0.9 % sodium chloride infusion (1,000 mLs IntraVENous New Bag 7/6/23 1757)   ondansetron (ZOFRAN) injection 4 mg (4 mg IntraVENous Given 7/6/23 1653)   magnesium sulfate 2000 mg in 50 mL IVPB premix (0 mg IntraVENous Stopped 7/6/23 1932)   iopamidol (ISOVUE-370) 76 % injection 75 mL (75 mLs IntraVENous Given 7/6/23 1751)       I, Dr. Carroll Comes, am the primary clinician of record on the case. I otherwise agree with the documentation performed by the resident/PIERCE. FINAL IMPRESSION      1. Nausea and vomiting, unspecified vomiting type    2. Dehydration    3. Hyponatremia    4. Hypomagnesemia    5.  Hypoxia          DISPOSITION/PLAN   DISPOSITION Admitted 07/06/2023 09:00:34 PM        Brandon Solis MD  Attending Emergency Physician        Brandon Solis MD  07/06/23 0449
although patient again with nontender exam.  Given metabolic disturbances suspected to be from acute dehydration we are at this time recommending admission. Case has been discussed with hospitalist and admission orders have been placed. Critical Care Time:   45 Minutes of critical care time spent not including separately billable procedures. FINAL IMPRESSION  1. Nausea and vomiting, unspecified vomiting type    2. Dehydration    3. Hyponatremia    4. Hypomagnesemia    5. Hypoxia      Based on emergency department evaluation do not feel that additional emergent treatment/work-up indicated at this time. Based on limitations of ongoing treatment and ruling out additional medical conditions from the emergency department, I do feel that admission indicated. Recommendations for admission have been discussed with Adrianne Nath and/or surrogate who are in agreement with plan at this time. DISPOSITION:  Patient was admitted to the hospital in stable condition. (Please note that portions of this note were completed with a voice recognition program.  Efforts were made to edit the dictations but occasionally words are mis-transcribed).           Reilly Kline [place]  07/07/23 2001

## 2023-07-07 NOTE — PLAN OF CARE
Problem: Pain  Goal: Verbalizes/displays adequate comfort level or baseline comfort level  Outcome: Adequate for Discharge     Problem: Safety - Adult  Goal: Free from fall injury  Outcome: Adequate for Discharge     Problem: Chronic Conditions and Co-morbidities  Goal: Patient's chronic conditions and co-morbidity symptoms are monitored and maintained or improved  Outcome: Adequate for Discharge

## 2023-07-07 NOTE — PROGRESS NOTES
Pt alert and oriented, VSS. Shift assessment completed and documented. Pt denies any symptoms from UTI (flank pain, dysuria). Was able to tolerate eating breakfast without and pain/nausea/vomiting. Up and ambulating. Denies any needs at this time. Call light within reach.

## 2023-07-07 NOTE — H&P
Hospital Medicine History & Physical      Date of Admission: 7/6/2023    Date of Service:  Pt seen/examined on 7/6/2023    Time of Service: 2050    []Admitted to Inpatient with expected LOS greater than two midnights due to medical therapy. [x]Placed in Observation status. Chief Admission Complaint:  Shaking at home, nausea and vomiting    Presenting Admission History:      82 y.o. female with a significant past medical history of hypertension, hyperlipidemia, type 2 diabetes, and congestive heart failure who presents to [facility] emergency department with complaint of nausea and vomiting at home about 3-4 times, also had shaking spell prior to arrival.  She denies any fever at home. She notes that she was recently diagnosed with a urinary tract infection yesterday by PCP, was started on Macrobid which she has never had before, took a total of 2 doses yesterday and a third for today's a.m. dose and developed stomach pain nausea vomiting. She notes that she has been undergoing GI evaluation for what she describes as PUD, so the abdominal pain and nausea vomiting is not a new symptom but questions whether or not could it be related to the Macrobid use. On arrival here, she received evaluation with laboratory studies, chest x-ray, and CT PE protocol. CT PE protocol is negative for any acute emboli or acute findings. Chest x-ray was negative. Pertinent lab values include sodium 125, chloride 85, creatinine 1.0 with a GFR 56, magnesium 1.4, lactic acid 1.1, blood sugar 124, proBNP 730, CK 42, and troponin 14. Cell count did show leukocytosis at 13.1, hemoglobin 13.6, and platelets 281. Urinalysis showed trace ketones without any other abnormalities. VBG showed pH of 7.48, PCO2 34, PO2 83, and HCO3 25. Patient received magnesium sulfate 2 g along with initiation of maintenance fluids at 100 an hour for total of 1 L.   Hospital team was consulted to place this patient in observation for nausea

## 2023-07-07 NOTE — PROGRESS NOTES
V2.0    [facility] [place]      Name:  Deborah Ontiveros /Age/Sex: 1940  (82 y.o. female)   MRN & CSN:  [id number] & [id number] Encounter Date/Time: 2023 10:41 AM EDT   Location:  [id number] PCP: Erin Hardin MD     Attending:Loyd Doan MD       Hospital Day: 2    Assessment and Recommendations   Deborah Ontiveros is a 82 y.o. female with pmh of hypertension, hyperlipidemia, type 2 diabetes, and congestive heart failure who presents with Nausea with vomiting. Patient on IV fluids for dehydration, electrolytes to be replenished as needed, GI consulted for persistent nausea and vomiting and has outpatient hx workup regarding PUD and gastroparesis. Plan:   Nausea with Vomiting/  2. Hyponatremia  3. Hypokalemia  - Likely due to dehydration  - Symptoms and electrolytes improved with fluids  - Fluids discontinued, continue to monitor output and electrolytes  - Appears she's undergone EGD with Dr. Shorty Doss in 10/2022 for gastritis as well as ongoing workup for gastroparesis  - GI consult placed for persistent nausea and vomiting  - Obtain gastric emptying study to work up gastroparesis, can obtain while patient is inpatient  - Magnesium replaced in ED, NS MIVF noted  - Potassium replacement given today  - Monitor BMP, Mg daily  - Zofran as needed    2. Acute cystitis  - Present on arrival  - Continue nitrofurantoin 100 mg PO BID per home regimen, today is day 3/7  - WBC of 9.1, no leukocytosis    3. History of CHF  - Not in acute exacerbation  - CHF order set placed  - Continue lisinopril, atenolol, spironolactone, and hydralazine have been held due to soft BP's and bradycardia    4. Chronic sinus bradycardia  - Follows with cardiology outpatient, Dr. Harrison Akhtar. Chart review showing patient follows annually with cardiology, and HR was in 70's at previous visit.    - Hold atenolol as patient with soft BP and HR in upper 40's  - Continue to monitor on tele  - Monitor pressure and HR

## 2023-07-07 NOTE — CONSULTS
Consult Placed   Consult sent and read via perfect serve  Who: Dr. Mariana Delgado  Date:7/7/2023    Time:10:27AMM     Electronically signed by Christine Gunter on 7/7/2023 at 10:27 AM
Gastroenterology Consult Note    Patient:   Gabriel Blandon   YOB: 1940   Facility:   [facility]   Referring/PCP: Agus Mcgovern MD  Date:     7/7/2023  Consultant:   Eric Bautista MD, MD    Subjective: This 82 y.o. female was admitted 7/6/2023 with a diagnosis of \"Nausea with vomiting [R11.2]  Dehydration [E86.0]  Hypomagnesemia [E83.42]  Hyponatremia [E87.1]  Hypoxia [R09.02]  Nausea and vomiting, unspecified vomiting type [R11.2]\" and is seen in consultation regarding \"N/V\". Information was obtained from interview of  the patient, examination of the patient, and review of records. I did  update the past medical, surgical, social and / or family history. N/V for months chronic moderate in GI tract assoc. W abd pain/wt loss    Current status  Present  Diet Order: ADULT DIET; Regular; 4 carb choices (60 gm/meal); Low Fat/Low Chol/High Fiber/2 gm Na and she is tolerating diet. Recently, she has experienced moderate, maximum 4/10 generalized abdominal  Pain and she has not required Intravenous opiate analgesics. The patient has also experienced no constipation, diarrhea, fever, hematochezia, and melena      Prior to Admission medications    Medication Sig Start Date End Date Taking?  Authorizing Provider   spironolactone (ALDACTONE) 25 MG tablet Take 1 tablet by mouth daily    Historical Provider, MD   levothyroxine (SYNTHROID) 100 MCG tablet  3/2/23   Historical Provider, MD   Calcium Carb-Cholecalciferol 600-20 MG-MCG CHEW Take by mouth 2 times daily    Historical Provider, MD   glipiZIDE (GLUCOTROL XL) 2.5 MG extended release tablet Take 4 tablets by mouth daily    Historical Provider, MD   hydrALAZINE (APRESOLINE) 25 MG tablet Take 1 tablet by mouth every 8 hours 12/11/22   Jazz Rodas MD   aspirin 81 MG EC tablet Take 1 tablet by mouth daily    Historical Provider, MD   Latanoprostene Bunod (VYZULTA) 0.024 % SOLN Apply 1 drop to eye nightly Both
[phone]

## 2023-07-10 ENCOUNTER — FOLLOWUP TELEPHONE ENCOUNTER (OUTPATIENT)
Dept: TELEMETRY | Age: 83
End: 2023-07-10

## 2023-07-10 NOTE — TELEPHONE ENCOUNTER
Care Transitions Initial Follow Up Call    Call within 2 business days of discharge: Yes     Patient: Deborah Ontiveros Patient : 1940 MRN: [id number]    [unfilled]    RARS: Readmission Risk Score: 16.1       Spoke with: patient     Discharge department/facility: home    Non-face-to-face services provided:  Scheduled appointment with PCP-7days    Follow Up  No future appointments.     Steve Burgos RN

## 2023-08-06 PROBLEM — N39.0 UTI (URINARY TRACT INFECTION): Status: RESOLVED | Noted: 2023-07-07 | Resolved: 2023-08-06

## 2024-01-24 NOTE — TELEPHONE ENCOUNTER
Last ov: 11/11/22  Upcoming ov: no upcoming    BMP- 07/07/23      Front- please schedule pt for yearly/med refill appt

## 2024-01-29 RX ORDER — HYDROCHLOROTHIAZIDE 25 MG/1
TABLET ORAL
Qty: 90 TABLET | Refills: 0 | Status: SHIPPED | OUTPATIENT
Start: 2024-01-29

## 2024-01-29 NOTE — TELEPHONE ENCOUNTER
01/29 2ND attempt to contact pt [phone] (Home Phone)  to schedule VSP OV. JOCELYNM to call us back

## 2024-08-28 ENCOUNTER — HOSPITAL ENCOUNTER (OUTPATIENT)
Dept: GENERAL RADIOLOGY | Age: 84
Discharge: HOME | End: 2024-08-28
Payer: MEDICARE

## 2024-08-28 ENCOUNTER — HOSPITAL ENCOUNTER (OUTPATIENT)
Age: 84
Discharge: HOME | End: 2024-08-28
Payer: MEDICARE

## 2024-08-28 DIAGNOSIS — M79.671 RIGHT FOOT PAIN: ICD-10-CM

## 2024-08-28 PROCEDURE — 73630 X-RAY EXAM OF FOOT: CPT

## 2024-09-20 ENCOUNTER — APPOINTMENT (OUTPATIENT)
Dept: CT IMAGING | Age: 84
DRG: 372 | End: 2024-09-20
Payer: MEDICARE

## 2024-09-20 ENCOUNTER — HOSPITAL ENCOUNTER (INPATIENT)
Age: 84
LOS: 3 days | Discharge: HOME | DRG: 372 | End: 2024-09-23
Attending: STUDENT IN AN ORGANIZED HEALTH CARE EDUCATION/TRAINING PROGRAM | Admitting: INTERNAL MEDICINE
Payer: MEDICARE

## 2024-09-20 DIAGNOSIS — K52.9 COLITIS: ICD-10-CM

## 2024-09-20 DIAGNOSIS — N17.9 AKI (ACUTE KIDNEY INJURY) (HCC): Primary | ICD-10-CM

## 2024-09-20 DIAGNOSIS — R10.84 GENERALIZED ABDOMINAL PAIN: ICD-10-CM

## 2024-09-20 DIAGNOSIS — R94.31 ABNORMAL EKG: ICD-10-CM

## 2024-09-20 LAB
ALBUMIN SERPL-MCNC: 4.8 G/DL (ref 3.4–5)
ALBUMIN/GLOB SERPL: 2 {RATIO} (ref 1.1–2.2)
ALP SERPL-CCNC: 66 U/L (ref 40–129)
ALT SERPL-CCNC: 15 U/L (ref 10–40)
ANION GAP SERPL CALCULATED.3IONS-SCNC: 13 MMOL/L (ref 3–16)
AST SERPL-CCNC: 14 U/L (ref 15–37)
BASOPHILS # BLD: 0 K/UL (ref 0–0.2)
BASOPHILS NFR BLD: 0 %
BILIRUB SERPL-MCNC: <0.2 MG/DL (ref 0–1)
BILIRUB UR QL STRIP.AUTO: NEGATIVE
BUN SERPL-MCNC: 57 MG/DL (ref 7–20)
CALCIUM SERPL-MCNC: 9.9 MG/DL (ref 8.3–10.6)
CHLORIDE SERPL-SCNC: 108 MMOL/L (ref 99–110)
CLARITY UR: CLEAR
CO2 SERPL-SCNC: 16 MMOL/L (ref 21–32)
COLOR UR: YELLOW
CREAT SERPL-MCNC: 1.8 MG/DL (ref 0.6–1.2)
DEPRECATED RDW RBC AUTO: 14.8 % (ref 12.4–15.4)
EOSINOPHIL # BLD: 0.1 K/UL (ref 0–0.6)
EOSINOPHIL NFR BLD: 1 %
GFR SERPLBLD CREATININE-BSD FMLA CKD-EPI: 28 ML/MIN/{1.73_M2}
GLUCOSE SERPL-MCNC: 97 MG/DL (ref 70–99)
GLUCOSE UR STRIP.AUTO-MCNC: NEGATIVE MG/DL
HCT VFR BLD AUTO: 41.9 % (ref 36–48)
HGB BLD-MCNC: 13.5 G/DL (ref 12–16)
HGB UR QL STRIP.AUTO: NEGATIVE
KETONES UR STRIP.AUTO-MCNC: NEGATIVE MG/DL
LEUKOCYTE ESTERASE UR QL STRIP.AUTO: NEGATIVE
LIPASE SERPL-CCNC: 110 U/L (ref 13–60)
LYMPHOCYTES # BLD: 2.9 K/UL (ref 1–5.1)
LYMPHOCYTES NFR BLD: 15 %
MCH RBC QN AUTO: 31.2 PG (ref 26–34)
MCHC RBC AUTO-ENTMCNC: 32.2 G/DL (ref 31–36)
MCV RBC AUTO: 96.7 FL (ref 80–100)
METAMYELOCYTES NFR BLD MANUAL: 2 %
MONOCYTES # BLD: 0.8 K/UL (ref 0–1.3)
MONOCYTES NFR BLD: 6 %
MYELOCYTES NFR BLD MANUAL: 1 %
NEUTROPHILS # BLD: 9.8 K/UL (ref 1.7–7.7)
NEUTROPHILS NFR BLD: 69 %
NITRITE UR QL STRIP.AUTO: NEGATIVE
PH UR STRIP.AUTO: 6 [PH] (ref 5–8)
PLATELET # BLD AUTO: 350 K/UL (ref 135–450)
PLATELET BLD QL SMEAR: ADEQUATE
PMV BLD AUTO: 7.2 FL (ref 5–10.5)
POTASSIUM SERPL-SCNC: 5.4 MMOL/L (ref 3.5–5.1)
PROT SERPL-MCNC: 7.2 G/DL (ref 6.4–8.2)
PROT UR STRIP.AUTO-MCNC: NEGATIVE MG/DL
RBC # BLD AUTO: 4.34 M/UL (ref 4–5.2)
RBC MORPH BLD: NORMAL
SLIDE REVIEW: ABNORMAL
SODIUM SERPL-SCNC: 137 MMOL/L (ref 136–145)
SP GR UR STRIP.AUTO: 1.02 (ref 1–1.03)
UA COMPLETE W REFLEX CULTURE PNL UR: NORMAL
UA DIPSTICK W REFLEX MICRO PNL UR: NORMAL
URN SPEC COLLECT METH UR: NORMAL
UROBILINOGEN UR STRIP-ACNC: 0.2 E.U./DL
VARIANT LYMPHS NFR BLD MANUAL: 6 % (ref 0–6)
WBC # BLD AUTO: 13.6 K/UL (ref 4–11)

## 2024-09-20 PROCEDURE — 96375 TX/PRO/DX INJ NEW DRUG ADDON: CPT

## 2024-09-20 PROCEDURE — 6360000002 HC RX W HCPCS: Performed by: STUDENT IN AN ORGANIZED HEALTH CARE EDUCATION/TRAINING PROGRAM

## 2024-09-20 PROCEDURE — 80053 COMPREHEN METABOLIC PANEL: CPT

## 2024-09-20 PROCEDURE — 6360000004 HC RX CONTRAST MEDICATION

## 2024-09-20 PROCEDURE — 85025 COMPLETE CBC W/AUTO DIFF WBC: CPT

## 2024-09-20 PROCEDURE — 83690 ASSAY OF LIPASE: CPT

## 2024-09-20 PROCEDURE — 93005 ELECTROCARDIOGRAM TRACING: CPT

## 2024-09-20 PROCEDURE — 96374 THER/PROPH/DIAG INJ IV PUSH: CPT

## 2024-09-20 PROCEDURE — 6360000002 HC RX W HCPCS

## 2024-09-20 PROCEDURE — 74177 CT ABD & PELVIS W/CONTRAST: CPT

## 2024-09-20 PROCEDURE — 93005 ELECTROCARDIOGRAM TRACING: CPT | Performed by: STUDENT IN AN ORGANIZED HEALTH CARE EDUCATION/TRAINING PROGRAM

## 2024-09-20 PROCEDURE — 1200000000 HC SEMI PRIVATE

## 2024-09-20 PROCEDURE — 81003 URINALYSIS AUTO W/O SCOPE: CPT

## 2024-09-20 PROCEDURE — 2580000003 HC RX 258

## 2024-09-20 PROCEDURE — 99285 EMERGENCY DEPT VISIT HI MDM: CPT

## 2024-09-20 PROCEDURE — 36415 COLL VENOUS BLD VENIPUNCTURE: CPT

## 2024-09-20 RX ORDER — 0.9 % SODIUM CHLORIDE 0.9 %
1000 INTRAVENOUS SOLUTION INTRAVENOUS ONCE
Status: COMPLETED | OUTPATIENT
Start: 2024-09-20 | End: 2024-09-20

## 2024-09-20 RX ORDER — MORPHINE SULFATE 4 MG/ML
4 INJECTION, SOLUTION INTRAMUSCULAR; INTRAVENOUS
Status: COMPLETED | OUTPATIENT
Start: 2024-09-20 | End: 2024-09-20

## 2024-09-20 RX ORDER — FENTANYL CITRATE 50 UG/ML
50 INJECTION, SOLUTION INTRAMUSCULAR; INTRAVENOUS ONCE
Status: COMPLETED | OUTPATIENT
Start: 2024-09-20 | End: 2024-09-20

## 2024-09-20 RX ORDER — CALCIUM GLUCONATE 20 MG/ML
1000 INJECTION, SOLUTION INTRAVENOUS ONCE
Status: COMPLETED | OUTPATIENT
Start: 2024-09-20 | End: 2024-09-21

## 2024-09-20 RX ORDER — IOPAMIDOL 755 MG/ML
75 INJECTION, SOLUTION INTRAVASCULAR
Status: COMPLETED | OUTPATIENT
Start: 2024-09-20 | End: 2024-09-20

## 2024-09-20 RX ORDER — ONDANSETRON 2 MG/ML
4 INJECTION INTRAMUSCULAR; INTRAVENOUS ONCE
Status: COMPLETED | OUTPATIENT
Start: 2024-09-20 | End: 2024-09-20

## 2024-09-20 RX ADMIN — SODIUM CHLORIDE 1000 ML: 9 INJECTION, SOLUTION INTRAVENOUS at 21:06

## 2024-09-20 RX ADMIN — HYDROMORPHONE HYDROCHLORIDE 0.5 MG: 1 INJECTION, SOLUTION INTRAMUSCULAR; INTRAVENOUS; SUBCUTANEOUS at 23:21

## 2024-09-20 RX ADMIN — FENTANYL CITRATE 50 MCG: 50 INJECTION INTRAMUSCULAR; INTRAVENOUS at 21:57

## 2024-09-20 RX ADMIN — IOPAMIDOL 75 ML: 755 INJECTION, SOLUTION INTRAVENOUS at 22:15

## 2024-09-20 RX ADMIN — CALCIUM GLUCONATE 1000 MG: 20 INJECTION, SOLUTION INTRAVENOUS at 23:18

## 2024-09-20 RX ADMIN — ONDANSETRON 4 MG: 2 INJECTION INTRAMUSCULAR; INTRAVENOUS at 21:01

## 2024-09-20 RX ADMIN — MORPHINE SULFATE 4 MG: 4 INJECTION, SOLUTION INTRAMUSCULAR; INTRAVENOUS at 21:01

## 2024-09-20 ASSESSMENT — PAIN DESCRIPTION - LOCATION
LOCATION: ABDOMEN;BACK
LOCATION: BACK
LOCATION: ABDOMEN
LOCATION: ABDOMEN

## 2024-09-20 ASSESSMENT — PAIN DESCRIPTION - ORIENTATION
ORIENTATION: RIGHT;LEFT
ORIENTATION: RIGHT;LEFT

## 2024-09-20 ASSESSMENT — PAIN SCALES - GENERAL
PAINLEVEL_OUTOF10: 10
PAINLEVEL_OUTOF10: 7
PAINLEVEL_OUTOF10: 10
PAINLEVEL_OUTOF10: 10

## 2024-09-20 ASSESSMENT — PAIN DESCRIPTION - DESCRIPTORS
DESCRIPTORS: ACHING
DESCRIPTORS: ACHING

## 2024-09-20 ASSESSMENT — PAIN - FUNCTIONAL ASSESSMENT: PAIN_FUNCTIONAL_ASSESSMENT: 0-10

## 2024-09-20 ASSESSMENT — PAIN DESCRIPTION - PAIN TYPE: TYPE: ACUTE PAIN

## 2024-09-20 ASSESSMENT — LIFESTYLE VARIABLES
HOW MANY STANDARD DRINKS CONTAINING ALCOHOL DO YOU HAVE ON A TYPICAL DAY: PATIENT DOES NOT DRINK
HOW OFTEN DO YOU HAVE A DRINK CONTAINING ALCOHOL: NEVER

## 2024-09-21 LAB
ALBUMIN SERPL-MCNC: 4.1 G/DL (ref 3.4–5)
ALBUMIN/GLOB SERPL: 2.2 {RATIO} (ref 1.1–2.2)
ALP SERPL-CCNC: 57 U/L (ref 40–129)
ALT SERPL-CCNC: 19 U/L (ref 10–40)
ANION GAP SERPL CALCULATED.3IONS-SCNC: 10 MMOL/L (ref 3–16)
ANION GAP SERPL CALCULATED.3IONS-SCNC: 10 MMOL/L (ref 3–16)
ANION GAP SERPL CALCULATED.3IONS-SCNC: 11 MMOL/L (ref 3–16)
ANION GAP SERPL CALCULATED.3IONS-SCNC: 8 MMOL/L (ref 3–16)
ANION GAP SERPL CALCULATED.3IONS-SCNC: 9 MMOL/L (ref 3–16)
AST SERPL-CCNC: 15 U/L (ref 15–37)
BASE EXCESS BLDV CALC-SCNC: -11.5 MMOL/L (ref -3–3)
BASOPHILS # BLD: 0 K/UL (ref 0–0.2)
BASOPHILS NFR BLD: 0 %
BILIRUB SERPL-MCNC: 0.3 MG/DL (ref 0–1)
BUN SERPL-MCNC: 33 MG/DL (ref 7–20)
BUN SERPL-MCNC: 34 MG/DL (ref 7–20)
BUN SERPL-MCNC: 38 MG/DL (ref 7–20)
BUN SERPL-MCNC: 49 MG/DL (ref 7–20)
BUN SERPL-MCNC: 51 MG/DL (ref 7–20)
CALCIUM SERPL-MCNC: 8.6 MG/DL (ref 8.3–10.6)
CALCIUM SERPL-MCNC: 8.7 MG/DL (ref 8.3–10.6)
CALCIUM SERPL-MCNC: 8.9 MG/DL (ref 8.3–10.6)
CALCIUM SERPL-MCNC: 9.1 MG/DL (ref 8.3–10.6)
CALCIUM SERPL-MCNC: 9.2 MG/DL (ref 8.3–10.6)
CHLORIDE SERPL-SCNC: 109 MMOL/L (ref 99–110)
CHLORIDE SERPL-SCNC: 111 MMOL/L (ref 99–110)
CHLORIDE SERPL-SCNC: 112 MMOL/L (ref 99–110)
CHLORIDE SERPL-SCNC: 113 MMOL/L (ref 99–110)
CHLORIDE SERPL-SCNC: 114 MMOL/L (ref 99–110)
CO2 BLDV-SCNC: 17 MMOL/L
CO2 SERPL-SCNC: 10 MMOL/L (ref 21–32)
CO2 SERPL-SCNC: 13 MMOL/L (ref 21–32)
CO2 SERPL-SCNC: 16 MMOL/L (ref 21–32)
CO2 SERPL-SCNC: 17 MMOL/L (ref 21–32)
CO2 SERPL-SCNC: 17 MMOL/L (ref 21–32)
COHGB MFR BLDV: 2.9 % (ref 0–1.5)
CREAT SERPL-MCNC: 1.3 MG/DL (ref 0.6–1.2)
CREAT SERPL-MCNC: 1.4 MG/DL (ref 0.6–1.2)
CREAT SERPL-MCNC: 1.4 MG/DL (ref 0.6–1.2)
DEPRECATED RDW RBC AUTO: 16.1 % (ref 12.4–15.4)
EKG ATRIAL RATE: 55 BPM
EKG ATRIAL RATE: 57 BPM
EKG DIAGNOSIS: NORMAL
EKG DIAGNOSIS: NORMAL
EKG P AXIS: 69 DEGREES
EKG P AXIS: 88 DEGREES
EKG P-R INTERVAL: 190 MS
EKG P-R INTERVAL: 210 MS
EKG Q-T INTERVAL: 426 MS
EKG Q-T INTERVAL: 438 MS
EKG QRS DURATION: 86 MS
EKG QRS DURATION: 90 MS
EKG QTC CALCULATION (BAZETT): 407 MS
EKG QTC CALCULATION (BAZETT): 426 MS
EKG R AXIS: -13 DEGREES
EKG R AXIS: -7 DEGREES
EKG T AXIS: 16 DEGREES
EKG T AXIS: 36 DEGREES
EKG VENTRICULAR RATE: 55 BPM
EKG VENTRICULAR RATE: 57 BPM
EOSINOPHIL # BLD: 0 K/UL (ref 0–0.6)
EOSINOPHIL NFR BLD: 0 %
GFR SERPLBLD CREATININE-BSD FMLA CKD-EPI: 37 ML/MIN/{1.73_M2}
GFR SERPLBLD CREATININE-BSD FMLA CKD-EPI: 37 ML/MIN/{1.73_M2}
GFR SERPLBLD CREATININE-BSD FMLA CKD-EPI: 41 ML/MIN/{1.73_M2}
GLUCOSE BLD-MCNC: 105 MG/DL (ref 70–99)
GLUCOSE BLD-MCNC: 109 MG/DL (ref 70–99)
GLUCOSE BLD-MCNC: 114 MG/DL (ref 70–99)
GLUCOSE BLD-MCNC: 74 MG/DL (ref 70–99)
GLUCOSE BLD-MCNC: 85 MG/DL (ref 70–99)
GLUCOSE SERPL-MCNC: 110 MG/DL (ref 70–99)
GLUCOSE SERPL-MCNC: 125 MG/DL (ref 70–99)
GLUCOSE SERPL-MCNC: 143 MG/DL (ref 70–99)
GLUCOSE SERPL-MCNC: 157 MG/DL (ref 70–99)
GLUCOSE SERPL-MCNC: 84 MG/DL (ref 70–99)
HCO3 BLDV-SCNC: 15.3 MMOL/L (ref 23–29)
HCT VFR BLD AUTO: 39.9 % (ref 36–48)
HGB BLD-MCNC: 12.4 G/DL (ref 12–16)
LACTATE BLDV-SCNC: 1.3 MMOL/L (ref 0.4–2)
LACTATE BLDV-SCNC: 2 MMOL/L (ref 0.4–2)
LACTATE BLDV-SCNC: 2.2 MMOL/L (ref 0.4–2)
LYMPHOCYTES # BLD: 2.5 K/UL (ref 1–5.1)
LYMPHOCYTES NFR BLD: 15 %
MCH RBC QN AUTO: 32.3 PG (ref 26–34)
MCHC RBC AUTO-ENTMCNC: 31.1 G/DL (ref 31–36)
MCV RBC AUTO: 103.7 FL (ref 80–100)
METAMYELOCYTES NFR BLD MANUAL: 2 %
METHGB MFR BLDV: 0.3 %
MONOCYTES # BLD: 0.8 K/UL (ref 0–1.3)
MONOCYTES NFR BLD: 5 %
MYELOCYTES NFR BLD MANUAL: 1 %
NEUTROPHILS # BLD: 13.5 K/UL (ref 1.7–7.7)
NEUTROPHILS NFR BLD: 68 %
NEUTS BAND NFR BLD MANUAL: 9 % (ref 0–7)
O2 THERAPY: ABNORMAL
PCO2 BLDV: 37.9 MMHG (ref 40–50)
PERFORMED ON: ABNORMAL
PERFORMED ON: NORMAL
PERFORMED ON: NORMAL
PH BLDV: 7.22 [PH] (ref 7.35–7.45)
PLATELET # BLD AUTO: 289 K/UL (ref 135–450)
PLATELET BLD QL SMEAR: ADEQUATE
PMV BLD AUTO: 7.2 FL (ref 5–10.5)
PO2 BLDV: 37.3 MMHG (ref 25–40)
POTASSIUM SERPL-SCNC: 4.9 MMOL/L (ref 3.5–5.1)
POTASSIUM SERPL-SCNC: 4.9 MMOL/L (ref 3.5–5.1)
POTASSIUM SERPL-SCNC: 5 MMOL/L (ref 3.5–5.1)
POTASSIUM SERPL-SCNC: 5.1 MMOL/L (ref 3.5–5.1)
POTASSIUM SERPL-SCNC: 5.6 MMOL/L (ref 3.5–5.1)
PROT SERPL-MCNC: 6 G/DL (ref 6.4–8.2)
RBC # BLD AUTO: 3.85 M/UL (ref 4–5.2)
RBC MORPH BLD: NORMAL
SAO2 % BLDV: 74 %
SLIDE REVIEW: ABNORMAL
SODIUM SERPL-SCNC: 134 MMOL/L (ref 136–145)
SODIUM SERPL-SCNC: 135 MMOL/L (ref 136–145)
SODIUM SERPL-SCNC: 137 MMOL/L (ref 136–145)
TROPONIN, HIGH SENSITIVITY: 19 NG/L (ref 0–14)
TROPONIN, HIGH SENSITIVITY: 24 NG/L (ref 0–14)
WBC # BLD AUTO: 16.9 K/UL (ref 4–11)

## 2024-09-21 PROCEDURE — 2580000003 HC RX 258: Performed by: NURSE PRACTITIONER

## 2024-09-21 PROCEDURE — 83605 ASSAY OF LACTIC ACID: CPT

## 2024-09-21 PROCEDURE — 6370000000 HC RX 637 (ALT 250 FOR IP): Performed by: STUDENT IN AN ORGANIZED HEALTH CARE EDUCATION/TRAINING PROGRAM

## 2024-09-21 PROCEDURE — 1200000000 HC SEMI PRIVATE

## 2024-09-21 PROCEDURE — 84484 ASSAY OF TROPONIN QUANT: CPT

## 2024-09-21 PROCEDURE — 93010 ELECTROCARDIOGRAM REPORT: CPT | Performed by: INTERNAL MEDICINE

## 2024-09-21 PROCEDURE — 6360000002 HC RX W HCPCS: Performed by: NURSE PRACTITIONER

## 2024-09-21 PROCEDURE — 6370000000 HC RX 637 (ALT 250 FOR IP): Performed by: NURSE PRACTITIONER

## 2024-09-21 PROCEDURE — 82803 BLOOD GASES ANY COMBINATION: CPT

## 2024-09-21 PROCEDURE — 87449 NOS EACH ORGANISM AG IA: CPT

## 2024-09-21 PROCEDURE — 36415 COLL VENOUS BLD VENIPUNCTURE: CPT

## 2024-09-21 PROCEDURE — 87324 CLOSTRIDIUM AG IA: CPT

## 2024-09-21 PROCEDURE — 85025 COMPLETE CBC W/AUTO DIFF WBC: CPT

## 2024-09-21 PROCEDURE — 80053 COMPREHEN METABOLIC PANEL: CPT

## 2024-09-21 RX ORDER — SODIUM CHLORIDE 9 MG/ML
INJECTION, SOLUTION INTRAVENOUS CONTINUOUS
Status: DISCONTINUED | OUTPATIENT
Start: 2024-09-21 | End: 2024-09-22

## 2024-09-21 RX ORDER — ACETAMINOPHEN 650 MG/1
650 SUPPOSITORY RECTAL EVERY 6 HOURS PRN
Status: DISCONTINUED | OUTPATIENT
Start: 2024-09-21 | End: 2024-09-23 | Stop reason: HOSPADM

## 2024-09-21 RX ORDER — ENOXAPARIN SODIUM 100 MG/ML
30 INJECTION SUBCUTANEOUS DAILY
Status: DISCONTINUED | OUTPATIENT
Start: 2024-09-21 | End: 2024-09-22

## 2024-09-21 RX ORDER — HYDRALAZINE HYDROCHLORIDE 25 MG/1
25 TABLET, FILM COATED ORAL EVERY 8 HOURS SCHEDULED
Status: DISCONTINUED | OUTPATIENT
Start: 2024-09-21 | End: 2024-09-23 | Stop reason: HOSPADM

## 2024-09-21 RX ORDER — SODIUM CHLORIDE 0.9 % (FLUSH) 0.9 %
5-40 SYRINGE (ML) INJECTION EVERY 12 HOURS SCHEDULED
Status: DISCONTINUED | OUTPATIENT
Start: 2024-09-21 | End: 2024-09-23 | Stop reason: HOSPADM

## 2024-09-21 RX ORDER — MIRTAZAPINE 15 MG/1
7.5 TABLET, FILM COATED ORAL NIGHTLY
COMMUNITY

## 2024-09-21 RX ORDER — ONDANSETRON 2 MG/ML
4 INJECTION INTRAMUSCULAR; INTRAVENOUS EVERY 6 HOURS PRN
Status: DISCONTINUED | OUTPATIENT
Start: 2024-09-21 | End: 2024-09-23 | Stop reason: HOSPADM

## 2024-09-21 RX ORDER — ATENOLOL 25 MG/1
25 TABLET ORAL DAILY
COMMUNITY

## 2024-09-21 RX ORDER — ONDANSETRON 4 MG/1
4 TABLET, ORALLY DISINTEGRATING ORAL EVERY 8 HOURS PRN
Status: DISCONTINUED | OUTPATIENT
Start: 2024-09-21 | End: 2024-09-23 | Stop reason: HOSPADM

## 2024-09-21 RX ORDER — LEVOTHYROXINE SODIUM 100 UG/1
100 TABLET ORAL DAILY
Status: DISCONTINUED | OUTPATIENT
Start: 2024-09-21 | End: 2024-09-23 | Stop reason: HOSPADM

## 2024-09-21 RX ORDER — ESCITALOPRAM OXALATE 10 MG/1
20 TABLET ORAL DAILY
Status: DISCONTINUED | OUTPATIENT
Start: 2024-09-21 | End: 2024-09-23 | Stop reason: HOSPADM

## 2024-09-21 RX ORDER — METRONIDAZOLE 500 MG/100ML
500 INJECTION, SOLUTION INTRAVENOUS EVERY 8 HOURS
Status: DISCONTINUED | OUTPATIENT
Start: 2024-09-21 | End: 2024-09-22

## 2024-09-21 RX ORDER — CALCIUM GLUCONATE 20 MG/ML
1000 INJECTION, SOLUTION INTRAVENOUS ONCE
Status: DISCONTINUED | OUTPATIENT
Start: 2024-09-21 | End: 2024-09-21

## 2024-09-21 RX ORDER — MONTELUKAST SODIUM 10 MG/1
10 TABLET ORAL NIGHTLY
Status: DISCONTINUED | OUTPATIENT
Start: 2024-09-21 | End: 2024-09-23 | Stop reason: HOSPADM

## 2024-09-21 RX ORDER — DEXTROSE MONOHYDRATE 25 G/50ML
50 INJECTION, SOLUTION INTRAVENOUS ONCE
Status: DISCONTINUED | OUTPATIENT
Start: 2024-09-21 | End: 2024-09-21

## 2024-09-21 RX ORDER — SODIUM CHLORIDE 9 MG/ML
INJECTION, SOLUTION INTRAVENOUS PRN
Status: DISCONTINUED | OUTPATIENT
Start: 2024-09-21 | End: 2024-09-23 | Stop reason: HOSPADM

## 2024-09-21 RX ORDER — ATORVASTATIN CALCIUM 10 MG/1
10 TABLET, FILM COATED ORAL DAILY
Status: DISCONTINUED | OUTPATIENT
Start: 2024-09-21 | End: 2024-09-23 | Stop reason: HOSPADM

## 2024-09-21 RX ORDER — INSULIN LISPRO 100 [IU]/ML
0-4 INJECTION, SOLUTION INTRAVENOUS; SUBCUTANEOUS NIGHTLY
Status: DISCONTINUED | OUTPATIENT
Start: 2024-09-21 | End: 2024-09-23 | Stop reason: HOSPADM

## 2024-09-21 RX ORDER — CIPROFLOXACIN 2 MG/ML
400 INJECTION, SOLUTION INTRAVENOUS EVERY 12 HOURS
Status: DISCONTINUED | OUTPATIENT
Start: 2024-09-21 | End: 2024-09-22

## 2024-09-21 RX ORDER — SODIUM CHLORIDE 0.9 % (FLUSH) 0.9 %
5-40 SYRINGE (ML) INJECTION PRN
Status: DISCONTINUED | OUTPATIENT
Start: 2024-09-21 | End: 2024-09-23 | Stop reason: HOSPADM

## 2024-09-21 RX ORDER — ASPIRIN 81 MG/1
81 TABLET ORAL DAILY
Status: DISCONTINUED | OUTPATIENT
Start: 2024-09-21 | End: 2024-09-23 | Stop reason: HOSPADM

## 2024-09-21 RX ORDER — GLUCAGON 1 MG/ML
1 KIT INJECTION PRN
Status: DISCONTINUED | OUTPATIENT
Start: 2024-09-21 | End: 2024-09-23 | Stop reason: HOSPADM

## 2024-09-21 RX ORDER — ACETAMINOPHEN 325 MG/1
650 TABLET ORAL EVERY 6 HOURS PRN
Status: DISCONTINUED | OUTPATIENT
Start: 2024-09-21 | End: 2024-09-23 | Stop reason: HOSPADM

## 2024-09-21 RX ORDER — POLYETHYLENE GLYCOL 3350 17 G/17G
17 POWDER, FOR SOLUTION ORAL DAILY PRN
Status: DISCONTINUED | OUTPATIENT
Start: 2024-09-21 | End: 2024-09-23 | Stop reason: HOSPADM

## 2024-09-21 RX ORDER — INSULIN LISPRO 100 [IU]/ML
0-4 INJECTION, SOLUTION INTRAVENOUS; SUBCUTANEOUS
Status: DISCONTINUED | OUTPATIENT
Start: 2024-09-21 | End: 2024-09-23 | Stop reason: HOSPADM

## 2024-09-21 RX ORDER — DEXTROSE MONOHYDRATE 100 MG/ML
INJECTION, SOLUTION INTRAVENOUS CONTINUOUS PRN
Status: DISCONTINUED | OUTPATIENT
Start: 2024-09-21 | End: 2024-09-23 | Stop reason: HOSPADM

## 2024-09-21 RX ADMIN — CIPROFLOXACIN 400 MG: 2 INJECTION, SOLUTION INTRAVENOUS at 01:46

## 2024-09-21 RX ADMIN — SODIUM CHLORIDE: 9 INJECTION, SOLUTION INTRAVENOUS at 00:59

## 2024-09-21 RX ADMIN — SODIUM CHLORIDE, PRESERVATIVE FREE 10 ML: 5 INJECTION INTRAVENOUS at 11:52

## 2024-09-21 RX ADMIN — HYDROMORPHONE HYDROCHLORIDE 0.5 MG: 1 INJECTION, SOLUTION INTRAMUSCULAR; INTRAVENOUS; SUBCUTANEOUS at 01:41

## 2024-09-21 RX ADMIN — SODIUM CHLORIDE: 9 INJECTION, SOLUTION INTRAVENOUS at 16:25

## 2024-09-21 RX ADMIN — SODIUM ZIRCONIUM CYCLOSILICATE 10 G: 10 POWDER, FOR SUSPENSION ORAL at 01:44

## 2024-09-21 RX ADMIN — MONTELUKAST 10 MG: 10 TABLET, FILM COATED ORAL at 21:28

## 2024-09-21 RX ADMIN — METRONIDAZOLE 500 MG: 500 INJECTION, SOLUTION INTRAVENOUS at 09:52

## 2024-09-21 RX ADMIN — LEVOTHYROXINE SODIUM 100 MCG: 0.1 TABLET ORAL at 06:43

## 2024-09-21 RX ADMIN — SODIUM CHLORIDE, PRESERVATIVE FREE 10 ML: 5 INJECTION INTRAVENOUS at 21:29

## 2024-09-21 RX ADMIN — METRONIDAZOLE 500 MG: 500 INJECTION, SOLUTION INTRAVENOUS at 18:47

## 2024-09-21 RX ADMIN — CIPROFLOXACIN 400 MG: 2 INJECTION, SOLUTION INTRAVENOUS at 11:52

## 2024-09-21 RX ADMIN — ENOXAPARIN SODIUM 30 MG: 100 INJECTION SUBCUTANEOUS at 09:53

## 2024-09-21 RX ADMIN — HYDROMORPHONE HYDROCHLORIDE 0.5 MG: 1 INJECTION, SOLUTION INTRAMUSCULAR; INTRAVENOUS; SUBCUTANEOUS at 17:34

## 2024-09-21 RX ADMIN — ASPIRIN 81 MG: 81 TABLET, COATED ORAL at 09:52

## 2024-09-21 RX ADMIN — CIPROFLOXACIN 400 MG: 2 INJECTION, SOLUTION INTRAVENOUS at 23:37

## 2024-09-21 RX ADMIN — METRONIDAZOLE 500 MG: 500 INJECTION, SOLUTION INTRAVENOUS at 03:28

## 2024-09-21 RX ADMIN — ESCITALOPRAM OXALATE 20 MG: 10 TABLET ORAL at 09:52

## 2024-09-21 RX ADMIN — ATORVASTATIN CALCIUM 10 MG: 10 TABLET, FILM COATED ORAL at 09:52

## 2024-09-21 ASSESSMENT — PAIN SCALES - GENERAL
PAINLEVEL_OUTOF10: 8
PAINLEVEL_OUTOF10: 8
PAINLEVEL_OUTOF10: 5
PAINLEVEL_OUTOF10: 0
PAINLEVEL_OUTOF10: 0

## 2024-09-21 ASSESSMENT — PAIN DESCRIPTION - LOCATION
LOCATION: ABDOMEN
LOCATION: ABDOMEN
LOCATION: ABDOMEN;BACK

## 2024-09-21 ASSESSMENT — PAIN - FUNCTIONAL ASSESSMENT: PAIN_FUNCTIONAL_ASSESSMENT: PREVENTS OR INTERFERES SOME ACTIVE ACTIVITIES AND ADLS

## 2024-09-21 ASSESSMENT — PAIN DESCRIPTION - DESCRIPTORS
DESCRIPTORS: ACHING
DESCRIPTORS: ACHING

## 2024-09-21 ASSESSMENT — PAIN DESCRIPTION - ORIENTATION
ORIENTATION: MID
ORIENTATION: LEFT;RIGHT
ORIENTATION: MID

## 2024-09-22 PROBLEM — A04.72 C. DIFFICILE COLITIS: Status: ACTIVE | Noted: 2024-09-22

## 2024-09-22 PROBLEM — K43.2 INCISIONAL HERNIA: Status: ACTIVE | Noted: 2024-09-22

## 2024-09-22 PROBLEM — R00.1 BRADYCARDIA: Status: ACTIVE | Noted: 2024-09-22

## 2024-09-22 PROBLEM — E87.20 METABOLIC ACIDOSIS: Status: ACTIVE | Noted: 2024-09-22

## 2024-09-22 LAB
ANION GAP SERPL CALCULATED.3IONS-SCNC: 5 MMOL/L (ref 3–16)
BASOPHILS # BLD: 0 K/UL (ref 0–0.2)
BASOPHILS NFR BLD: 0 %
BUN SERPL-MCNC: 23 MG/DL (ref 7–20)
C DIFF TOX A+B STL QL IA: ABNORMAL
CALCIUM SERPL-MCNC: 8.9 MG/DL (ref 8.3–10.6)
CHLORIDE SERPL-SCNC: 114 MMOL/L (ref 99–110)
CO2 SERPL-SCNC: 19 MMOL/L (ref 21–32)
CREAT SERPL-MCNC: 1 MG/DL (ref 0.6–1.2)
DEPRECATED RDW RBC AUTO: 14.8 % (ref 12.4–15.4)
EOSINOPHIL # BLD: 0.1 K/UL (ref 0–0.6)
EOSINOPHIL NFR BLD: 2 %
GFR SERPLBLD CREATININE-BSD FMLA CKD-EPI: 56 ML/MIN/{1.73_M2}
GLUCOSE BLD-MCNC: 108 MG/DL (ref 70–99)
GLUCOSE BLD-MCNC: 110 MG/DL (ref 70–99)
GLUCOSE BLD-MCNC: 93 MG/DL (ref 70–99)
GLUCOSE BLD-MCNC: 98 MG/DL (ref 70–99)
GLUCOSE SERPL-MCNC: 98 MG/DL (ref 70–99)
HCT VFR BLD AUTO: 31.8 % (ref 36–48)
HGB BLD-MCNC: 10.4 G/DL (ref 12–16)
LYMPHOCYTES # BLD: 1.4 K/UL (ref 1–5.1)
LYMPHOCYTES NFR BLD: 19 %
MAGNESIUM SERPL-MCNC: 1.4 MG/DL (ref 1.8–2.4)
MCH RBC QN AUTO: 31.8 PG (ref 26–34)
MCHC RBC AUTO-ENTMCNC: 32.6 G/DL (ref 31–36)
MCV RBC AUTO: 97.7 FL (ref 80–100)
METAMYELOCYTES NFR BLD MANUAL: 1 %
MONOCYTES # BLD: 0.5 K/UL (ref 0–1.3)
MONOCYTES NFR BLD: 7 %
MYELOCYTES NFR BLD MANUAL: 1 %
NEUTROPHILS # BLD: 5.3 K/UL (ref 1.7–7.7)
NEUTROPHILS NFR BLD: 68 %
NEUTS BAND NFR BLD MANUAL: 2 % (ref 0–7)
PERFORMED ON: ABNORMAL
PERFORMED ON: ABNORMAL
PERFORMED ON: NORMAL
PERFORMED ON: NORMAL
PLATELET # BLD AUTO: 238 K/UL (ref 135–450)
PLATELET BLD QL SMEAR: ADEQUATE
PMV BLD AUTO: 7 FL (ref 5–10.5)
POTASSIUM SERPL-SCNC: 4.6 MMOL/L (ref 3.5–5.1)
RBC # BLD AUTO: 3.26 M/UL (ref 4–5.2)
RBC MORPH BLD: NORMAL
SLIDE REVIEW: ABNORMAL
SODIUM SERPL-SCNC: 138 MMOL/L (ref 136–145)
WBC # BLD AUTO: 7.3 K/UL (ref 4–11)

## 2024-09-22 PROCEDURE — 80048 BASIC METABOLIC PNL TOTAL CA: CPT

## 2024-09-22 PROCEDURE — 6370000000 HC RX 637 (ALT 250 FOR IP): Performed by: INTERNAL MEDICINE

## 2024-09-22 PROCEDURE — 83735 ASSAY OF MAGNESIUM: CPT

## 2024-09-22 PROCEDURE — 1200000000 HC SEMI PRIVATE

## 2024-09-22 PROCEDURE — 6360000002 HC RX W HCPCS: Performed by: NURSE PRACTITIONER

## 2024-09-22 PROCEDURE — 99222 1ST HOSP IP/OBS MODERATE 55: CPT | Performed by: SURGERY

## 2024-09-22 PROCEDURE — 36415 COLL VENOUS BLD VENIPUNCTURE: CPT

## 2024-09-22 PROCEDURE — 85025 COMPLETE CBC W/AUTO DIFF WBC: CPT

## 2024-09-22 PROCEDURE — 2580000003 HC RX 258: Performed by: NURSE PRACTITIONER

## 2024-09-22 PROCEDURE — 6370000000 HC RX 637 (ALT 250 FOR IP): Performed by: NURSE PRACTITIONER

## 2024-09-22 PROCEDURE — 6360000002 HC RX W HCPCS: Performed by: INTERNAL MEDICINE

## 2024-09-22 RX ORDER — ENOXAPARIN SODIUM 100 MG/ML
40 INJECTION SUBCUTANEOUS DAILY
Status: DISCONTINUED | OUTPATIENT
Start: 2024-09-23 | End: 2024-09-23 | Stop reason: HOSPADM

## 2024-09-22 RX ORDER — MAGNESIUM SULFATE IN WATER 40 MG/ML
2000 INJECTION, SOLUTION INTRAVENOUS ONCE
Status: COMPLETED | OUTPATIENT
Start: 2024-09-22 | End: 2024-09-22

## 2024-09-22 RX ORDER — VANCOMYCIN HYDROCHLORIDE 125 MG/1
125 CAPSULE ORAL 4 TIMES DAILY
Status: DISCONTINUED | OUTPATIENT
Start: 2024-09-22 | End: 2024-09-23 | Stop reason: HOSPADM

## 2024-09-22 RX ADMIN — ASPIRIN 81 MG: 81 TABLET, COATED ORAL at 09:19

## 2024-09-22 RX ADMIN — LEVOTHYROXINE SODIUM 100 MCG: 0.1 TABLET ORAL at 05:29

## 2024-09-22 RX ADMIN — VANCOMYCIN HYDROCHLORIDE 125 MG: 125 CAPSULE ORAL at 21:25

## 2024-09-22 RX ADMIN — SODIUM CHLORIDE, PRESERVATIVE FREE 10 ML: 5 INJECTION INTRAVENOUS at 21:25

## 2024-09-22 RX ADMIN — MONTELUKAST 10 MG: 10 TABLET, FILM COATED ORAL at 21:25

## 2024-09-22 RX ADMIN — VANCOMYCIN HYDROCHLORIDE 125 MG: 125 CAPSULE ORAL at 13:23

## 2024-09-22 RX ADMIN — ATORVASTATIN CALCIUM 10 MG: 10 TABLET, FILM COATED ORAL at 09:19

## 2024-09-22 RX ADMIN — VANCOMYCIN HYDROCHLORIDE 125 MG: 125 CAPSULE ORAL at 16:26

## 2024-09-22 RX ADMIN — SODIUM CHLORIDE: 9 INJECTION, SOLUTION INTRAVENOUS at 05:29

## 2024-09-22 RX ADMIN — ESCITALOPRAM OXALATE 20 MG: 10 TABLET ORAL at 09:19

## 2024-09-22 RX ADMIN — ENOXAPARIN SODIUM 30 MG: 100 INJECTION SUBCUTANEOUS at 09:19

## 2024-09-22 RX ADMIN — METRONIDAZOLE 500 MG: 500 INJECTION, SOLUTION INTRAVENOUS at 02:58

## 2024-09-22 RX ADMIN — SODIUM CHLORIDE, PRESERVATIVE FREE 10 ML: 5 INJECTION INTRAVENOUS at 09:15

## 2024-09-22 RX ADMIN — MAGNESIUM SULFATE HEPTAHYDRATE 2000 MG: 40 INJECTION, SOLUTION INTRAVENOUS at 08:01

## 2024-09-22 ASSESSMENT — PAIN SCALES - GENERAL
PAINLEVEL_OUTOF10: 0

## 2024-09-23 ENCOUNTER — APPOINTMENT (OUTPATIENT)
Age: 84
DRG: 372 | End: 2024-09-23
Attending: INTERNAL MEDICINE
Payer: MEDICARE

## 2024-09-23 VITALS
TEMPERATURE: 98.3 F | OXYGEN SATURATION: 95 % | HEIGHT: 64 IN | BODY MASS INDEX: 26.09 KG/M2 | SYSTOLIC BLOOD PRESSURE: 138 MMHG | DIASTOLIC BLOOD PRESSURE: 64 MMHG | HEART RATE: 55 BPM | RESPIRATION RATE: 16 BRPM | WEIGHT: 152.8 LBS

## 2024-09-23 LAB
ANION GAP SERPL CALCULATED.3IONS-SCNC: 6 MMOL/L (ref 3–16)
BASOPHILS # BLD: 0 K/UL (ref 0–0.2)
BASOPHILS NFR BLD: 0.6 %
BUN SERPL-MCNC: 10 MG/DL (ref 7–20)
CALCIUM SERPL-MCNC: 8.8 MG/DL (ref 8.3–10.6)
CHLORIDE SERPL-SCNC: 112 MMOL/L (ref 99–110)
CO2 SERPL-SCNC: 20 MMOL/L (ref 21–32)
CREAT SERPL-MCNC: 1 MG/DL (ref 0.6–1.2)
DEPRECATED RDW RBC AUTO: 14.3 % (ref 12.4–15.4)
ECHO AO ASC DIAM: 2.7 CM
ECHO AO ASCENDING AORTA INDEX: 1.55 CM/M2
ECHO AO ROOT DIAM: 3.2 CM
ECHO AO ROOT INDEX: 1.84 CM/M2
ECHO AV AREA PEAK VELOCITY: 2.1 CM2
ECHO AV AREA VTI: 2 CM2
ECHO AV AREA/BSA PEAK VELOCITY: 1.2 CM2/M2
ECHO AV AREA/BSA VTI: 1.1 CM2/M2
ECHO AV CUSP MM: 1.9 CM
ECHO AV MEAN GRADIENT: 6 MMHG
ECHO AV MEAN VELOCITY: 1.1 M/S
ECHO AV PEAK GRADIENT: 10 MMHG
ECHO AV PEAK VELOCITY: 1.6 M/S
ECHO AV VELOCITY RATIO: 0.75
ECHO AV VTI: 37.6 CM
ECHO BSA: 1.77 M2
ECHO LA AREA 2C: 28.4 CM2
ECHO LA AREA 4C: 27.2 CM2
ECHO LA DIAMETER INDEX: 1.95 CM/M2
ECHO LA DIAMETER: 3.4 CM
ECHO LA MAJOR AXIS: 6.8 CM
ECHO LA MINOR AXIS: 6.2 CM
ECHO LA TO AORTIC ROOT RATIO: 1.06
ECHO LA VOL BP: 99 ML (ref 22–52)
ECHO LA VOL MOD A2C: 101 ML (ref 22–52)
ECHO LA VOL MOD A4C: 87 ML (ref 22–52)
ECHO LA VOL/BSA BIPLANE: 57 ML/M2 (ref 16–34)
ECHO LA VOLUME INDEX MOD A2C: 58 ML/M2 (ref 16–34)
ECHO LA VOLUME INDEX MOD A4C: 50 ML/M2 (ref 16–34)
ECHO LV E' LATERAL VELOCITY: 9.9 CM/S
ECHO LV E' SEPTAL VELOCITY: 5.4 CM/S
ECHO LV EF PHYSICIAN: 65 %
ECHO LV FRACTIONAL SHORTENING: 20 % (ref 28–44)
ECHO LV INTERNAL DIMENSION DIASTOLE INDEX: 2.59 CM/M2
ECHO LV INTERNAL DIMENSION DIASTOLIC: 4.5 CM (ref 3.9–5.3)
ECHO LV INTERNAL DIMENSION SYSTOLIC INDEX: 2.07 CM/M2
ECHO LV INTERNAL DIMENSION SYSTOLIC: 3.6 CM
ECHO LV ISOVOLUMETRIC RELAXATION TIME (IVRT): 66 MS
ECHO LV IVSD: 0.8 CM (ref 0.6–0.9)
ECHO LV MASS 2D: 113.6 G (ref 67–162)
ECHO LV MASS INDEX 2D: 65.3 G/M2 (ref 43–95)
ECHO LV POSTERIOR WALL DIASTOLIC: 0.8 CM (ref 0.6–0.9)
ECHO LV RELATIVE WALL THICKNESS RATIO: 0.36
ECHO LVOT AREA: 2.8 CM2
ECHO LVOT AV VTI INDEX: 0.7
ECHO LVOT DIAM: 1.9 CM
ECHO LVOT MEAN GRADIENT: 3 MMHG
ECHO LVOT PEAK GRADIENT: 6 MMHG
ECHO LVOT PEAK VELOCITY: 1.2 M/S
ECHO LVOT STROKE VOLUME INDEX: 43.2 ML/M2
ECHO LVOT SV: 75.1 ML
ECHO LVOT VTI: 26.5 CM
ECHO MV A VELOCITY: 1.13 M/S
ECHO MV E DECELERATION TIME (DT): 197 MS
ECHO MV E VELOCITY: 1.01 M/S
ECHO MV E/A RATIO: 0.89
ECHO MV E/E' LATERAL: 10.2
ECHO MV E/E' RATIO (AVERAGED): 14.45
ECHO MV E/E' SEPTAL: 18.7
ECHO RA AREA 4C: 23.5 CM2
ECHO RA END SYSTOLIC VOLUME APICAL 4 CHAMBER INDEX BSA: 44 ML/M2
ECHO RA VOLUME: 77 ML
ECHO RV FREE WALL PEAK S': 15.9 CM/S
ECHO RV TAPSE: 3.1 CM (ref 1.7–?)
ECHO TV REGURGITANT MAX VELOCITY: 3.2 M/S
ECHO TV REGURGITANT PEAK GRADIENT: 41 MMHG
EOSINOPHIL # BLD: 0.2 K/UL (ref 0–0.6)
EOSINOPHIL NFR BLD: 2.3 %
GFR SERPLBLD CREATININE-BSD FMLA CKD-EPI: 56 ML/MIN/{1.73_M2}
GLUCOSE BLD-MCNC: 112 MG/DL (ref 70–99)
GLUCOSE BLD-MCNC: 114 MG/DL (ref 70–99)
GLUCOSE BLD-MCNC: 93 MG/DL (ref 70–99)
GLUCOSE SERPL-MCNC: 99 MG/DL (ref 70–99)
HCT VFR BLD AUTO: 32.7 % (ref 36–48)
HGB BLD-MCNC: 10.5 G/DL (ref 12–16)
LYMPHOCYTES # BLD: 1.3 K/UL (ref 1–5.1)
LYMPHOCYTES NFR BLD: 17.5 %
MAGNESIUM SERPL-MCNC: 1.8 MG/DL (ref 1.8–2.4)
MCH RBC QN AUTO: 31.2 PG (ref 26–34)
MCHC RBC AUTO-ENTMCNC: 32.2 G/DL (ref 31–36)
MCV RBC AUTO: 96.9 FL (ref 80–100)
MONOCYTES # BLD: 0.9 K/UL (ref 0–1.3)
MONOCYTES NFR BLD: 11.1 %
NEUTROPHILS # BLD: 5.3 K/UL (ref 1.7–7.7)
NEUTROPHILS NFR BLD: 68.5 %
PERFORMED ON: ABNORMAL
PERFORMED ON: ABNORMAL
PERFORMED ON: NORMAL
PLATELET # BLD AUTO: 247 K/UL (ref 135–450)
PMV BLD AUTO: 7.5 FL (ref 5–10.5)
POTASSIUM SERPL-SCNC: 4.5 MMOL/L (ref 3.5–5.1)
RBC # BLD AUTO: 3.38 M/UL (ref 4–5.2)
SODIUM SERPL-SCNC: 138 MMOL/L (ref 136–145)
WBC # BLD AUTO: 7.7 K/UL (ref 4–11)

## 2024-09-23 PROCEDURE — 2580000003 HC RX 258: Performed by: NURSE PRACTITIONER

## 2024-09-23 PROCEDURE — 76376 3D RENDER W/INTRP POSTPROCES: CPT | Performed by: INTERNAL MEDICINE

## 2024-09-23 PROCEDURE — 6370000000 HC RX 637 (ALT 250 FOR IP): Performed by: NURSE PRACTITIONER

## 2024-09-23 PROCEDURE — APPNB45 APP NON BILLABLE 31-45 MINUTES: Performed by: CLINICAL NURSE SPECIALIST

## 2024-09-23 PROCEDURE — 6370000000 HC RX 637 (ALT 250 FOR IP): Performed by: INTERNAL MEDICINE

## 2024-09-23 PROCEDURE — 80048 BASIC METABOLIC PNL TOTAL CA: CPT

## 2024-09-23 PROCEDURE — 36415 COLL VENOUS BLD VENIPUNCTURE: CPT

## 2024-09-23 PROCEDURE — 93306 TTE W/DOPPLER COMPLETE: CPT

## 2024-09-23 PROCEDURE — 83735 ASSAY OF MAGNESIUM: CPT

## 2024-09-23 PROCEDURE — 85025 COMPLETE CBC W/AUTO DIFF WBC: CPT

## 2024-09-23 PROCEDURE — 93306 TTE W/DOPPLER COMPLETE: CPT | Performed by: INTERNAL MEDICINE

## 2024-09-23 RX ORDER — VANCOMYCIN HYDROCHLORIDE 125 MG/1
125 CAPSULE ORAL 4 TIMES DAILY
Qty: 40 EACH | Refills: 0 | Status: SHIPPED | OUTPATIENT
Start: 2024-09-23 | End: 2024-10-03

## 2024-09-23 RX ADMIN — VANCOMYCIN HYDROCHLORIDE 125 MG: 125 CAPSULE ORAL at 13:15

## 2024-09-23 RX ADMIN — SODIUM CHLORIDE, PRESERVATIVE FREE 10 ML: 5 INJECTION INTRAVENOUS at 09:20

## 2024-09-23 RX ADMIN — ESCITALOPRAM OXALATE 20 MG: 10 TABLET ORAL at 09:19

## 2024-09-23 RX ADMIN — ATORVASTATIN CALCIUM 10 MG: 10 TABLET, FILM COATED ORAL at 09:20

## 2024-09-23 RX ADMIN — VANCOMYCIN HYDROCHLORIDE 125 MG: 125 CAPSULE ORAL at 09:20

## 2024-09-23 RX ADMIN — LEVOTHYROXINE SODIUM 100 MCG: 0.1 TABLET ORAL at 05:49

## 2024-09-23 RX ADMIN — ASPIRIN 81 MG: 81 TABLET, COATED ORAL at 09:20

## 2024-09-23 RX ADMIN — VANCOMYCIN HYDROCHLORIDE 125 MG: 125 CAPSULE ORAL at 16:29

## 2024-10-08 ENCOUNTER — INITIAL CONSULT (OUTPATIENT)
Dept: SURGERY | Age: 84
End: 2024-10-08
Payer: MEDICARE

## 2024-10-08 ENCOUNTER — TELEPHONE (OUTPATIENT)
Dept: SURGERY | Age: 84
End: 2024-10-08

## 2024-10-08 ENCOUNTER — PREP FOR PROCEDURE (OUTPATIENT)
Dept: SURGERY | Age: 84
End: 2024-10-08

## 2024-10-08 VITALS
WEIGHT: 161.4 LBS | HEIGHT: 64 IN | DIASTOLIC BLOOD PRESSURE: 68 MMHG | BODY MASS INDEX: 27.55 KG/M2 | SYSTOLIC BLOOD PRESSURE: 130 MMHG

## 2024-10-08 DIAGNOSIS — K43.2 INCISIONAL HERNIA, WITHOUT OBSTRUCTION OR GANGRENE: Primary | ICD-10-CM

## 2024-10-08 PROCEDURE — 3075F SYST BP GE 130 - 139MM HG: CPT | Performed by: SURGERY

## 2024-10-08 PROCEDURE — 3078F DIAST BP <80 MM HG: CPT | Performed by: SURGERY

## 2024-10-08 PROCEDURE — 99214 OFFICE O/P EST MOD 30 MIN: CPT | Performed by: SURGERY

## 2024-10-08 PROCEDURE — 1123F ACP DISCUSS/DSCN MKR DOCD: CPT | Performed by: SURGERY

## 2024-10-08 RX ORDER — SODIUM CHLORIDE 0.9 % (FLUSH) 0.9 %
5-40 SYRINGE (ML) INJECTION EVERY 12 HOURS SCHEDULED
OUTPATIENT
Start: 2024-10-08

## 2024-10-08 RX ORDER — SODIUM CHLORIDE 9 MG/ML
INJECTION, SOLUTION INTRAVENOUS PRN
OUTPATIENT
Start: 2024-10-08

## 2024-10-08 RX ORDER — LISINOPRIL 40 MG/1
40 TABLET ORAL DAILY
COMMUNITY

## 2024-10-08 RX ORDER — HYDRALAZINE HYDROCHLORIDE 25 MG/1
25 TABLET, FILM COATED ORAL 2 TIMES DAILY
COMMUNITY

## 2024-10-08 RX ORDER — SODIUM CHLORIDE 0.9 % (FLUSH) 0.9 %
5-40 SYRINGE (ML) INJECTION PRN
OUTPATIENT
Start: 2024-10-08

## 2024-10-08 NOTE — PATIENT INSTRUCTIONS
[facility]  Phone: [phone]  Fax: [phone]    You will be scheduled for surgery with Dr. Hsu.   The office will call you with the date and time that surgery is scheduled.  Please take note of these instructions for surgery:  You should have nothing by mouth after midnight the night before your surgery - this includes no food or water.   Your surgery will be cancelled if you have taken anything by mouth after midnight, NO exceptions.   You will need to have a history and physical prior to your surgery. This will need to be completed up to 30 days before your surgery. This H/P can be completed by your family doctor or the hospital.   IF you take coumadin (warfarin), please stop taking this medication 5 days prior to your surgery.   IF you take plavix, please stop taking this 7 days prior to your surgery.   Please contact our office if you have a pacemaker or defibrillator.  IF you are allergic to latex, please tell our office prior to your surgery. This is important in know before scheduling your surgery.  IF you are having an out patient surgery, you will need someone available to drive you home after your surgery, and to also stay with you for the rest of the day.   IF you are having a surgery requiring an inpatient stay in the hospital, you will need someone to drive you home upon discharge from the hospital.  Please contact Dr. Hsu's assistant Shanika if you have any questions or concerns.  Please call the office with any changes in your symptoms or further questions/concerns. [phone]

## 2024-10-08 NOTE — PROGRESS NOTES
reducible, nontender.    MUSCULOSKELETAL: No edema  NEUROLOGIC:  Mental Status Exam:  Level of Alertness:   awake  Orientation:   person, place, time      DATA:  Radiology Review:  images reviewed by me  CT abd/pelvis  FINDINGS:  Lower Chest: Moderate size hiatal hernia.  No acute findings in the lung  bases.     Organs: Cholecystectomy.  The liver, pancreas, spleen, adrenals and kidneys  reveal no acute findings.     GI/Bowel: Mild fat stranding adjacent to the proximal sigmoid colon, for  which mild acute colitis should be considered.  No evidence for bowel  obstruction.  Unremarkable appearance of bowel anastomosis in the mid  abdomen.  The cecum is medialized and position in the left mid abdomen.  No  evidence for acute appendicitis.  Mild liquid stool burden.     Pelvis: No acute findings identified.     Peritoneum/Retroperitoneum: No free air or free fluid.  The aorta is normal  in caliber.  The visceral branches are patent.  No lymphadenopathy.     Bones/Soft Tissues: No acute findings identified.  Postoperative findings in  the abdominal wall with mild periumbilical diastasis.  Advanced multilevel  degenerative disc disease and facet arthropathy with degenerative grade 1  anterolisthesis of L4.     IMPRESSION:  1. Mild focal wall thickening and adjacent fat stranding in the proximal  sigmoid colon, for which mild acute colitis should be considered.  Consider  follow-up with imaging or colonoscopy to ensure resolution.  2. Moderate size hiatal hernia.  3. Additional chronic and postoperative findings, as described.  ASSESSMENT:     Diagnosis Orders   1. Incisional hernia, without obstruction or gangrene          This should be amenable to a standard minimally-invasive, component-separation technique for fascial repair and wide soft mesh placement for reinforcement    PLAN:    We will schedule the pt for robotic incisional hernia repair - likely via a retrorectus approach. The technical aspects of this type of

## 2024-10-08 NOTE — PROGRESS NOTES
Ailyn S Angelina    Age 84 y.o.    female    1940    MRN [id number]    10/23/2024  Arrival Time_____________  OR Time____________202 Min     Procedure(s):  ROBOTIC INCISIONAL HERNIA REPAIR, POSSIBLE OPEN PROCEDURE                      General   Surgeon(s):  Hsu, Saul Briceño, MD      DAY ADMIT ___  SDS/OP ___  OUTPT IN BED ___        Phone [phone] (home)                  PCP _____________________ Phone_________________ Epic ( ) Epic CE ( ) Appt ________    NOTES: _________________________________________ Consult/Cardio _______________    ____________________________________________________________________________    ____________________________________________________________________________  PAT APPT DATE:________ TIME: ________  FAXED QAD: _______  (__) H&P w/ Hospitalist    (__) PAT orders in EPIC    (__) Meet with PAT nurse  __________________________________________________________________________  Preop Nurse phone screen complete: _____________  (__) CBC     (__) W/ DIFF ___________  (__) CT CHEST  __________   (__) Hgb A1C    ___________  (__) CHEST X RAY   __________  (__) LIPID PROFILE  ___________  (__) EKG   __________  (__) PT-INR / APTT  ___________  (__) PFT's   __________  (__) BMP   ___________  (__) CAROTIDS  __________  (__) CMP   ___________  (__) VEIN MAPPING  __________  (__) U/A   ___________  ( X ) HISTORY & PHYSICAL __________  (__) URINE C & S  ___________  (__) CARDIAC CLEARANCE __________  (__) U/A W/ FLEX  ___________  (__) PULM. CLEARANCE __________  (__) SERUM PREGNANCY ___________  (__) Preop Orders in EPIC __________  (__) TYPE & SCREEN __________repeat ( ) (__)  __________________ __________  (__) Albumin   ___________  (__)  __________________ __________  (__) TRANSFERRIN  ___________  (__)  __________________ __________  (__) LIVER PROFILE  ___________  (__) URINE PREG DOS __________  (__) MRSA NASAL SWAB ___________  (__) BLOOD SUGAR DOS __________  (__) SED

## 2024-10-08 NOTE — TELEPHONE ENCOUNTER
Pt saw Dr Hsu in the office 10/8/24 and was given surgery instructions for a Robotic Incisional Hernia Repair, Possible Open Procedure (General Anes) scheduled 10/23/24 @ 9:45am arrival 7:45am [facility] - NPO after midnight connie b/f surgery - Pt will need  day of surgery - Dr Marquez to complete pre-op physical - Pt understood and agreed w/ above noted

## 2024-10-17 NOTE — PROGRESS NOTES
Surgery Date and Time: 10/23/24 @ 10:00 am   Arrival Time:  08:00 am        The instructions given when and if a patient needs to stop oral intake prior to surgery varies. Follow the instructions you were      given by your Surgeon or RN during the Pre-op call.      __X__Do not eat or drink anything after Midnight the night before the surgery. NO gum, mints, candy or ice chips the day of surgery.         Only take the following medications with a small sip of water the morning of surgery:  atenolol, levothyroxine, hydralazine        Hold the following medications (per anesthesia or surgeon request):  lisinopril     Last Dose:  10/21/24 pm         Aspirin, Ibuprofen, Advil, Naproxen, Vitamin E and other Anti-inflammatory products and supplements should be stopped        for 5 -7days before surgery or as directed by your physician. Last dose aspirin and glucosamine 10/18/24.                  NO DIABETES MEDICATIONS DAY OF SURGERY.                - If you take a long acting-insulin, take your normal dose the night before surgery & half the dose if taken in the morning.     - Do not smoke or vape, and do not drink any alcoholic beverages 24 hours prior to surgery, this includes NA Beer. Refrain from using     any recreational drugs, including non-prescribed prescription drugs.     -You may brush your teeth and gargle the morning of surgery.  DO NOT SWALLOW WATER.    -You MUST plan for a responsible adult to stay on site while you are here and take you home after your surgery. You will not be allowed                to leave alone or drive yourself home. It is requested someone stay with you the first 24 hrs. Your surgery will be cancelled if you do not                have a ride home with a responsible adult.    -A parent/legal guardian must accompany a child scheduled for surgery and plan to stay at the hospital until the child                is discharged. Please do not bring other children with you.    -Please

## 2024-10-23 ENCOUNTER — ANESTHESIA EVENT (OUTPATIENT)
Dept: OPERATING ROOM | Age: 84
End: 2024-10-23
Payer: MEDICARE

## 2024-10-23 ENCOUNTER — ANESTHESIA (OUTPATIENT)
Dept: OPERATING ROOM | Age: 84
End: 2024-10-23
Payer: MEDICARE

## 2024-10-23 ENCOUNTER — HOSPITAL ENCOUNTER (OUTPATIENT)
Age: 84
Setting detail: OUTPATIENT SURGERY
Discharge: HOME | End: 2024-10-23
Attending: SURGERY | Admitting: SURGERY
Payer: MEDICARE

## 2024-10-23 VITALS
TEMPERATURE: 97.4 F | HEIGHT: 64 IN | BODY MASS INDEX: 27.66 KG/M2 | OXYGEN SATURATION: 93 % | WEIGHT: 162 LBS | DIASTOLIC BLOOD PRESSURE: 65 MMHG | RESPIRATION RATE: 19 BRPM | HEART RATE: 52 BPM | SYSTOLIC BLOOD PRESSURE: 150 MMHG

## 2024-10-23 DIAGNOSIS — G89.18 POSTOPERATIVE PAIN: Primary | ICD-10-CM

## 2024-10-23 DIAGNOSIS — K43.2 INCISIONAL HERNIA: ICD-10-CM

## 2024-10-23 LAB
ANION GAP SERPL CALCULATED.3IONS-SCNC: 13 MMOL/L (ref 3–16)
BUN SERPL-MCNC: 21 MG/DL (ref 7–20)
CALCIUM SERPL-MCNC: 9.1 MG/DL (ref 8.3–10.6)
CHLORIDE SERPL-SCNC: 112 MMOL/L (ref 99–110)
CO2 SERPL-SCNC: 17 MMOL/L (ref 21–32)
CREAT SERPL-MCNC: 1 MG/DL (ref 0.6–1.2)
GFR SERPLBLD CREATININE-BSD FMLA CKD-EPI: 56 ML/MIN/{1.73_M2}
GLUCOSE BLD-MCNC: 133 MG/DL (ref 70–99)
GLUCOSE BLD-MCNC: 135 MG/DL (ref 70–99)
GLUCOSE SERPL-MCNC: 139 MG/DL (ref 70–99)
PERFORMED ON: ABNORMAL
PERFORMED ON: ABNORMAL
POTASSIUM SERPL-SCNC: 3.9 MMOL/L (ref 3.5–5.1)
SODIUM SERPL-SCNC: 142 MMOL/L (ref 136–145)

## 2024-10-23 PROCEDURE — 2580000003 HC RX 258: Performed by: SURGERY

## 2024-10-23 PROCEDURE — 3700000001 HC ADD 15 MINUTES (ANESTHESIA): Performed by: SURGERY

## 2024-10-23 PROCEDURE — 7100000011 HC PHASE II RECOVERY - ADDTL 15 MIN: Performed by: SURGERY

## 2024-10-23 PROCEDURE — 88302 TISSUE EXAM BY PATHOLOGIST: CPT

## 2024-10-23 PROCEDURE — 6360000002 HC RX W HCPCS: Performed by: ANESTHESIOLOGY

## 2024-10-23 PROCEDURE — 7100000000 HC PACU RECOVERY - FIRST 15 MIN: Performed by: SURGERY

## 2024-10-23 PROCEDURE — 3600000009 HC SURGERY ROBOT BASE: Performed by: SURGERY

## 2024-10-23 PROCEDURE — 7100000001 HC PACU RECOVERY - ADDTL 15 MIN: Performed by: SURGERY

## 2024-10-23 PROCEDURE — 6370000000 HC RX 637 (ALT 250 FOR IP): Performed by: ANESTHESIOLOGY

## 2024-10-23 PROCEDURE — 3700000000 HC ANESTHESIA ATTENDED CARE: Performed by: SURGERY

## 2024-10-23 PROCEDURE — 2500000003 HC RX 250 WO HCPCS: Performed by: ANESTHESIOLOGY

## 2024-10-23 PROCEDURE — 64488 TAP BLOCK BI INJECTION: CPT | Performed by: ANESTHESIOLOGY

## 2024-10-23 PROCEDURE — 7100000010 HC PHASE II RECOVERY - FIRST 15 MIN: Performed by: SURGERY

## 2024-10-23 PROCEDURE — S2900 ROBOTIC SURGICAL SYSTEM: HCPCS | Performed by: SURGERY

## 2024-10-23 PROCEDURE — 80048 BASIC METABOLIC PNL TOTAL CA: CPT

## 2024-10-23 PROCEDURE — 2709999900 HC NON-CHARGEABLE SUPPLY: Performed by: SURGERY

## 2024-10-23 PROCEDURE — 49593 RPR AA HRN 1ST 3-10 RDC: CPT | Performed by: SURGERY

## 2024-10-23 PROCEDURE — 3600000019 HC SURGERY ROBOT ADDTL 15MIN: Performed by: SURGERY

## 2024-10-23 PROCEDURE — C1781 MESH (IMPLANTABLE): HCPCS | Performed by: SURGERY

## 2024-10-23 PROCEDURE — 6360000002 HC RX W HCPCS: Performed by: SURGERY

## 2024-10-23 DEVICE — VENTRALIGHT ST MESH WITH ECHO PS POSITIONING SYSTEM, 6" (15.2 CM), CIRCLE
Type: IMPLANTABLE DEVICE | Site: ABDOMEN | Status: FUNCTIONAL
Brand: VENTRALIGHT

## 2024-10-23 RX ORDER — ONDANSETRON 2 MG/ML
INJECTION INTRAMUSCULAR; INTRAVENOUS
Status: DISCONTINUED | OUTPATIENT
Start: 2024-10-23 | End: 2024-10-23 | Stop reason: SDUPTHER

## 2024-10-23 RX ORDER — LIDOCAINE HYDROCHLORIDE 10 MG/ML
2 INJECTION, SOLUTION INFILTRATION; PERINEURAL
Status: DISCONTINUED | OUTPATIENT
Start: 2024-10-23 | End: 2024-10-23 | Stop reason: HOSPADM

## 2024-10-23 RX ORDER — PROCHLORPERAZINE EDISYLATE 5 MG/ML
5 INJECTION INTRAMUSCULAR; INTRAVENOUS
Status: DISCONTINUED | OUTPATIENT
Start: 2024-10-23 | End: 2024-10-23 | Stop reason: HOSPADM

## 2024-10-23 RX ORDER — EPHEDRINE SULFATE 50 MG/ML
INJECTION INTRAVENOUS
Status: DISCONTINUED | OUTPATIENT
Start: 2024-10-23 | End: 2024-10-23 | Stop reason: SDUPTHER

## 2024-10-23 RX ORDER — NALOXONE HYDROCHLORIDE 0.4 MG/ML
INJECTION, SOLUTION INTRAMUSCULAR; INTRAVENOUS; SUBCUTANEOUS PRN
Status: DISCONTINUED | OUTPATIENT
Start: 2024-10-23 | End: 2024-10-23 | Stop reason: HOSPADM

## 2024-10-23 RX ORDER — SODIUM CHLORIDE 9 MG/ML
INJECTION, SOLUTION INTRAVENOUS PRN
Status: DISCONTINUED | OUTPATIENT
Start: 2024-10-23 | End: 2024-10-23 | Stop reason: HOSPADM

## 2024-10-23 RX ORDER — LABETALOL HYDROCHLORIDE 5 MG/ML
10 INJECTION, SOLUTION INTRAVENOUS
Status: DISCONTINUED | OUTPATIENT
Start: 2024-10-23 | End: 2024-10-23 | Stop reason: HOSPADM

## 2024-10-23 RX ORDER — DEXAMETHASONE SODIUM PHOSPHATE 4 MG/ML
INJECTION, SOLUTION INTRA-ARTICULAR; INTRALESIONAL; INTRAMUSCULAR; INTRAVENOUS; SOFT TISSUE
Status: DISCONTINUED | OUTPATIENT
Start: 2024-10-23 | End: 2024-10-23 | Stop reason: SDUPTHER

## 2024-10-23 RX ORDER — SODIUM CHLORIDE 0.9 % (FLUSH) 0.9 %
5-40 SYRINGE (ML) INJECTION EVERY 12 HOURS SCHEDULED
Status: DISCONTINUED | OUTPATIENT
Start: 2024-10-23 | End: 2024-10-23 | Stop reason: HOSPADM

## 2024-10-23 RX ORDER — MEPERIDINE HYDROCHLORIDE 50 MG/ML
12.5 INJECTION INTRAMUSCULAR; INTRAVENOUS; SUBCUTANEOUS EVERY 5 MIN PRN
Status: DISCONTINUED | OUTPATIENT
Start: 2024-10-23 | End: 2024-10-23 | Stop reason: HOSPADM

## 2024-10-23 RX ORDER — OXYCODONE HYDROCHLORIDE 5 MG/1
5 TABLET ORAL
Status: COMPLETED | OUTPATIENT
Start: 2024-10-23 | End: 2024-10-23

## 2024-10-23 RX ORDER — SODIUM CHLORIDE 0.9 % (FLUSH) 0.9 %
5-40 SYRINGE (ML) INJECTION PRN
Status: DISCONTINUED | OUTPATIENT
Start: 2024-10-23 | End: 2024-10-23 | Stop reason: HOSPADM

## 2024-10-23 RX ORDER — GLYCOPYRROLATE 0.2 MG/ML
INJECTION INTRAMUSCULAR; INTRAVENOUS
Status: DISCONTINUED | OUTPATIENT
Start: 2024-10-23 | End: 2024-10-23 | Stop reason: SDUPTHER

## 2024-10-23 RX ORDER — LIDOCAINE HYDROCHLORIDE 20 MG/ML
INJECTION, SOLUTION INFILTRATION; PERINEURAL
Status: DISCONTINUED | OUTPATIENT
Start: 2024-10-23 | End: 2024-10-23 | Stop reason: SDUPTHER

## 2024-10-23 RX ORDER — PROPOFOL 10 MG/ML
INJECTION, EMULSION INTRAVENOUS
Status: DISCONTINUED | OUTPATIENT
Start: 2024-10-23 | End: 2024-10-23 | Stop reason: SDUPTHER

## 2024-10-23 RX ORDER — ROCURONIUM BROMIDE 10 MG/ML
INJECTION, SOLUTION INTRAVENOUS
Status: DISCONTINUED | OUTPATIENT
Start: 2024-10-23 | End: 2024-10-23 | Stop reason: SDUPTHER

## 2024-10-23 RX ORDER — FENTANYL CITRATE 50 UG/ML
INJECTION, SOLUTION INTRAMUSCULAR; INTRAVENOUS
Status: DISCONTINUED | OUTPATIENT
Start: 2024-10-23 | End: 2024-10-23 | Stop reason: SDUPTHER

## 2024-10-23 RX ORDER — LORAZEPAM 2 MG/ML
0.5 INJECTION INTRAMUSCULAR
Status: DISCONTINUED | OUTPATIENT
Start: 2024-10-23 | End: 2024-10-23 | Stop reason: HOSPADM

## 2024-10-23 RX ORDER — OXYCODONE AND ACETAMINOPHEN 5; 325 MG/1; MG/1
1 TABLET ORAL EVERY 4 HOURS PRN
Qty: 20 TABLET | Refills: 0 | Status: SHIPPED | OUTPATIENT
Start: 2024-10-23 | End: 2024-10-26

## 2024-10-23 RX ORDER — ONDANSETRON 2 MG/ML
4 INJECTION INTRAMUSCULAR; INTRAVENOUS
Status: DISCONTINUED | OUTPATIENT
Start: 2024-10-23 | End: 2024-10-23 | Stop reason: HOSPADM

## 2024-10-23 RX ORDER — BUPIVACAINE HYDROCHLORIDE 2.5 MG/ML
INJECTION, SOLUTION EPIDURAL; INFILTRATION; INTRACAUDAL
Status: COMPLETED | OUTPATIENT
Start: 2024-10-23 | End: 2024-10-23

## 2024-10-23 RX ORDER — DIPHENHYDRAMINE HYDROCHLORIDE 50 MG/ML
12.5 INJECTION INTRAMUSCULAR; INTRAVENOUS
Status: DISCONTINUED | OUTPATIENT
Start: 2024-10-23 | End: 2024-10-23 | Stop reason: HOSPADM

## 2024-10-23 RX ADMIN — EPHEDRINE SULFATE 5 MG: 50 INJECTION INTRAVENOUS at 11:46

## 2024-10-23 RX ADMIN — PROPOFOL 150 MG: 10 INJECTION, EMULSION INTRAVENOUS at 11:28

## 2024-10-23 RX ADMIN — CEFAZOLIN 2000 MG: 2 INJECTION, POWDER, FOR SOLUTION INTRAVENOUS at 11:40

## 2024-10-23 RX ADMIN — LIDOCAINE HYDROCHLORIDE 100 MG: 20 INJECTION, SOLUTION INFILTRATION; PERINEURAL at 11:28

## 2024-10-23 RX ADMIN — FENTANYL CITRATE 100 MCG: 50 INJECTION, SOLUTION INTRAMUSCULAR; INTRAVENOUS at 11:28

## 2024-10-23 RX ADMIN — SODIUM CHLORIDE: 9 INJECTION, SOLUTION INTRAVENOUS at 11:22

## 2024-10-23 RX ADMIN — EPHEDRINE SULFATE 10 MG: 50 INJECTION INTRAVENOUS at 12:04

## 2024-10-23 RX ADMIN — ROCURONIUM BROMIDE 50 MG: 50 INJECTION, SOLUTION INTRAVENOUS at 11:28

## 2024-10-23 RX ADMIN — ONDANSETRON 4 MG: 2 INJECTION INTRAMUSCULAR; INTRAVENOUS at 13:20

## 2024-10-23 RX ADMIN — GLYCOPYRROLATE 0.2 MG: 0.2 INJECTION, SOLUTION INTRAMUSCULAR; INTRAVENOUS at 11:54

## 2024-10-23 RX ADMIN — DEXAMETHASONE SODIUM PHOSPHATE 8 MG: 4 INJECTION, SOLUTION INTRAMUSCULAR; INTRAVENOUS at 13:02

## 2024-10-23 RX ADMIN — FENTANYL CITRATE 50 MCG: 50 INJECTION, SOLUTION INTRAMUSCULAR; INTRAVENOUS at 12:53

## 2024-10-23 RX ADMIN — ROCURONIUM BROMIDE 25 MG: 50 INJECTION, SOLUTION INTRAVENOUS at 12:10

## 2024-10-23 RX ADMIN — SUGAMMADEX 200 MG: 100 INJECTION, SOLUTION INTRAVENOUS at 13:20

## 2024-10-23 RX ADMIN — OXYCODONE 5 MG: 5 TABLET ORAL at 14:57

## 2024-10-23 RX ADMIN — BUPIVACAINE HYDROCHLORIDE 30 ML: 2.5 INJECTION, SOLUTION EPIDURAL; INFILTRATION; INTRACAUDAL; PERINEURAL at 11:50

## 2024-10-23 ASSESSMENT — PAIN SCALES - GENERAL
PAINLEVEL_OUTOF10: 5
PAINLEVEL_OUTOF10: 3

## 2024-10-23 ASSESSMENT — PAIN - FUNCTIONAL ASSESSMENT: PAIN_FUNCTIONAL_ASSESSMENT: 0-10

## 2024-10-23 ASSESSMENT — PAIN DESCRIPTION - LOCATION: LOCATION: ABDOMEN

## 2024-10-23 NOTE — PROGRESS NOTES
Patient arrived to PACU bay 3, phase one initiated. Placed on bedside monitor, VSS. Report obtained from OR RN and anesthesia. Patient on O2 via nasal cannula at 2L. See flowsheets for assessment. Warm blankets applied. Side rails in place, will monitor patient closely.

## 2024-10-23 NOTE — ANESTHESIA PROCEDURE NOTES
Peripheral Block    Patient location during procedure: OR  Reason for block: post-op pain management and at surgeon's request  Start time: 10/23/2024 11:50 AM  End time: 10/23/2024 11:55 AM  Staffing  Performed: anesthesiologist   Anesthesiologist: Alessandro Nieto MD  Resident/CRNA: Abdoul Parsons APRN - CRNA  Performed by: Alessandro Nieto MD  Authorized by: Alessandro Nieto MD    Preanesthetic Checklist  Completed: patient identified, IV checked, site marked, risks and benefits discussed, surgical/procedural consents, equipment checked, pre-op evaluation, timeout performed, anesthesia consent given, oxygen available, monitors applied/VS acknowledged, fire risk safety assessment completed and verbalized and blood product R/B/A discussed and consented  Peripheral Block   Patient position: supine  Prep: ChloraPrep  Provider prep: mask and sterile gloves  Patient monitoring: cardiac monitor, continuous pulse ox, frequent blood pressure checks, oxygen, IV access, responsive to questions and continuous capnometry  Block type: TAP  Laterality: bilateral  Injection technique: single-shot  Guidance: ultrasound guided    Needle   Needle type: insulated echogenic nerve stimulator needle   Needle gauge: 20 G  Needle localization: ultrasound guidance and anatomical landmarks  Needle length: 15 cm  Assessment   Injection assessment: negative aspiration for heme, no paresthesia on injection, local visualized surrounding nerve on ultrasound and no intravascular symptoms  Paresthesia pain: none  Slow fractionated injection: yes  Hemodynamics: stable  Outcomes: uncomplicated and patient tolerated procedure well    Medications Administered  BUPivacaine (MARCAINE) PF injection 0.25% - Perineural   30 mL - 10/23/2024 11:50:00 AM

## 2024-10-23 NOTE — DISCHARGE INSTRUCTIONS
[facility]    Saul Hsu M.D.   [facility] Office      [facility] Office          [facility]               [address]                [address]  Lionel Lincoln M.D.              [address]           [address]            [place] [address]         [place] [address]  Jagdeep Hutchison M.D                         [phone]          [facility]                   Nish Tao M.D.            [facility]                                                        POST-OPERATIVE INSTRUCTIONS HERNIA REPAIR    Call the office to schedule your post-operative appointment with your surgeon for two (2) weeks.     If you still have bandages over your incisions, you  may remove them in 2-3 days.    Place an ice pack over your incisions on and off (15min) at a time for the next 2 days.    General guidelines for activity:      Avoid strenuous activity or lifting anything heavier than 15 pounds.       It is OK to be up and walking around. Going up and down stairs is   also OK.      Do what is comfortable: stop and rest when you feel tired.     You will have pain medicine ordered. Take as directed.     Do NOT drive while taking your narcotic pain medicine.      Watch for signs of infection:    Excessive warmth or bright redness around your incisions    Leakage of cloudy fluid from you incisions    Fever over 101.5    If you experience constipation  Increase your water intake.  Increase your activity; walking is best.  A stool softener or mild laxative may be necessary if you still have not had a bowel  movement ; call the office for further instructions.    Please take note: IF you do not take all of your narcotic pain medication, we ask that you dispose of these responsibly.   Drug drop off boxes are located in the [facility] and [facility] emergency departments.   These [facility] Safe return 
Airborne+Contact precautions

## 2024-10-23 NOTE — PROGRESS NOTES
Pt meets criteria to end Phase I care. Patient alert, off oxygen, tolerating PO. Phase II initiated, VSS.  Pt's daughter at bedside.

## 2024-10-23 NOTE — BRIEF OP NOTE
Brief Postoperative Note      Patient: Ailyn Alfaro  YOB: 1940  MRN: [id number]    Date of Procedure: 10/23/2024    Pre-Op Diagnosis Codes:      * Incisional hernia [K43.2]    Post-Op Diagnosis: Same       Procedure(s):  ROBOTIC INCISIONAL HERNIA REPAIR, POSSIBLE OPEN PROCEDURE    Surgeon(s):  Maryuri Hsu MD    Assistant:  Surgical Assistant: Marlene Cordova Joseph    Anesthesia: General    Estimated Blood Loss (mL): less than 50     Complications: None    Specimens:   ID Type Source Tests Collected by Time Destination   A : HERNIA SAC Tissue Tissue SURGICAL PATHOLOGY Maryuri Hsu MD 10/23/2024 1246        Implants:  Implant Name Type Inv. Item Serial No.  Lot No. LRB No. Used Action   MESH SUZIE DIA6IN CIR W/ ECHO PS POS SYS VENTRALIGHT ST - [id number]  MESH SUZIE DIA6IN CIR W/ ECHO PS POS SYS VENTRALIGHT ST  BARD DAVOL-WD [id number] N/A 1 Implanted         Drains: * No LDAs found *    Findings:  Infection Present At Time Of Surgery (PATOS) (choose all levels that have infection present):  No infection present  Other Findings: 7x8cm supraumbilical fascial defect without any incarcerated contents          Excision of hernia sac and fascial defect closure           Placement of [id number] Ventriolight mesh for support    Job#: [id number]    Electronically signed by MARYURI HSU MD on 10/24/2024 at 8:16 AM

## 2024-10-23 NOTE — PROGRESS NOTES
Patient admitted to [facility] room 2 in preparation for surgery, VSS. Consent confirmed. IV inserted into right wrist and left hand, saline lock. Belongings on [facility] cart. NPO since 2200. Family at bedside, phone number in system for text updates, call light within reach.

## 2024-10-23 NOTE — ANESTHESIA POSTPROCEDURE EVALUATION
Department of Anesthesiology  Postprocedure Note    Patient: Ailyn Alfaro  MRN: [id number]  YOB: 1940  Date of evaluation: 10/23/2024    Procedure Summary       Date: 10/23/24 Room / Location: [address]    Anesthesia Start: 1122 Anesthesia Stop: 1354    Procedure: ROBOTIC INCISIONAL HERNIA REPAIR, POSSIBLE OPEN PROCEDURE (Abdomen) Diagnosis:       Incisional hernia      (Incisional hernia [K43.2])    Surgeons: Saul Hsu MD Responsible Provider: Alessandro Nieot MD    Anesthesia Type: general, regional ASA Status: 3            Anesthesia Type: No value filed.    Don Phase I: Don Score: 10    Don Phase II: Don Score: 10    Anesthesia Post Evaluation    Patient location during evaluation: PACU  Patient participation: complete - patient participated  Level of consciousness: awake and alert  Pain score: 1  Airway patency: patent  Nausea & Vomiting: no vomiting and no nausea  Cardiovascular status: hemodynamically stable and blood pressure returned to baseline  Respiratory status: acceptable  Hydration status: euvolemic  Comments: --------------------            10/23/24               1505     --------------------   BP:     (!) 150/65   Pulse:      52       Resp:       19       Temp:                SpO2:      93%      --------------------    Multimodal analgesia pain management approach  Pain management: adequate    No notable events documented.

## 2024-10-23 NOTE — ANESTHESIA PRE PROCEDURE
multiple procedures    ENDOSCOPY, COLON, DIAGNOSTIC      ESOPHAGOGASTRODUODENOSCOPY      HYSTERECTOMY (CERVIX STATUS UNKNOWN)      OVARY REMOVAL         Social History:    Social History     Tobacco Use    Smoking status: Never    Smokeless tobacco: Never   Substance Use Topics    Alcohol use: No                                Counseling given: Not Answered      Vital Signs (Current):   Vitals:    10/17/24 1339 10/23/24 0843   BP:  (!) 156/67   Pulse:  55   Resp:  16   Temp:  97.7 °F (36.5 °C)   TempSrc:  Oral   SpO2:  98%   Weight: 73 kg (161 lb) 73.5 kg (162 lb)   Height: 1.626 m (5' 4\") 1.626 m (5' 4\")                                              BP Readings from Last 3 Encounters:   10/23/24 (!) 156/67   10/08/24 130/68   09/23/24 138/64       NPO Status: Time of last liquid consumption: 2200                        Time of last solid consumption: 1830                        Date of last liquid consumption: 10/22/24                        Date of last solid food consumption: 10/22/24    BMI:   Wt Readings from Last 3 Encounters:   10/23/24 73.5 kg (162 lb)   10/08/24 73.2 kg (161 lb 6.4 oz)   09/20/24 69.3 kg (152 lb 12.8 oz)     Body mass index is 27.81 kg/m².    CBC:   Lab Results   Component Value Date/Time    WBC 7.7 09/23/2024 05:30 AM    RBC 3.38 09/23/2024 05:30 AM    HGB 10.5 09/23/2024 05:30 AM    HCT 32.7 09/23/2024 05:30 AM    MCV 96.9 09/23/2024 05:30 AM    RDW 14.3 09/23/2024 05:30 AM     09/23/2024 05:30 AM       CMP:   Lab Results   Component Value Date/Time     09/23/2024 05:30 AM    K 4.5 09/23/2024 05:30 AM    K 5.0 09/21/2024 06:02 PM     09/23/2024 05:30 AM    CO2 20 09/23/2024 05:30 AM    BUN 10 09/23/2024 05:30 AM    CREATININE 1.0 09/23/2024 05:30 AM    GFRAA >60 03/06/2022 06:26 PM    AGRATIO 2.2 09/21/2024 01:28 PM    LABGLOM 56 09/23/2024 05:30 AM    LABGLOM >60 07/07/2023 05:57 AM    GLUCOSE 99 09/23/2024 05:30 AM    CALCIUM 8.8 09/23/2024 05:30 AM    BILITOT 0.3

## 2024-10-24 NOTE — OP NOTE
[address] [address]                            OPERATIVE REPORT      PATIENT NAME: VICTORIANO GAGE           : 1940  MED REC NO: [id number]                      ROOM: [facility]  ACCOUNT NO: [id number]                       ADMIT DATE: 10/23/2024  PROVIDER: Saul Hsu MD      DATE OF PROCEDURE:  10/23/2024    SURGEON:  Saul Hsu MD    PREOPERATIVE DIAGNOSIS:  Incisional hernia.    POSTOPERATIVE DIAGNOSIS:  Incisional hernia.    PROCEDURE PERFORMED:  Robotic incisional hernia repair with mesh.    ANESTHESIA:  General.    ESTIMATED BLOOD LOSS:  Less than 50 mL.    SPECIMEN:  Hernia sac.    COUNTS:  Sponge and needle count correct.    INDICATIONS:  The patient is an 84-year-old female, who had a prior periumbilical midline incision for a prior small intestinal surgery, who has developed a pronounced hernia through her incision and presents now for elective repair.    FINDINGS:    1. 7 x 8 cm supraumbilical fascial defect without any incarcerated contents.  2. Excision of hernia sac and fascial defect closure.  3. Placement of 15 x 15 cm Ventralight mesh for support.    DESCRIPTION OF PROCEDURE:  After informed consent was obtained, the patient was taken to the operating room and placed in the supine position.  Preoperative antibiotics were initiated in the holding area.  Antithrombotic pumps were placed on the legs.  General anesthesia was administered without difficulty.  The abdomen was prepped and draped in the sterile fashion.  We began with a trocar incision in the lateral left upper quadrant after infiltrating with local anesthesia.  The trocar was guided under direct vision into the abdominal cavity, and insufflation was initiated.  Two more trocars were placed across the left lateral abdomen under direct vision, and the initial trocar in the upper quadrant was upsized to a 12 mm trocar.  The bed was

## 2024-10-31 ENCOUNTER — HOSPITAL ENCOUNTER (OUTPATIENT)
Age: 84
Discharge: HOME | End: 2024-10-31
Payer: MEDICARE

## 2024-10-31 LAB — C DIFF TOX A+B STL QL IA: NORMAL

## 2024-10-31 PROCEDURE — 87493 C DIFF AMPLIFIED PROBE: CPT

## 2024-10-31 PROCEDURE — 87324 CLOSTRIDIUM AG IA: CPT

## 2024-10-31 PROCEDURE — 87449 NOS EACH ORGANISM AG IA: CPT

## 2024-11-01 LAB
C DIFF TOX GENS STL QL NAA+PROBE: ABNORMAL
ORGANISM: ABNORMAL

## 2024-11-08 ENCOUNTER — OFFICE VISIT (OUTPATIENT)
Dept: SURGERY | Age: 84
End: 2024-11-08

## 2024-11-08 VITALS
HEIGHT: 64 IN | WEIGHT: 161.6 LBS | BODY MASS INDEX: 27.59 KG/M2 | DIASTOLIC BLOOD PRESSURE: 78 MMHG | SYSTOLIC BLOOD PRESSURE: 134 MMHG

## 2024-11-08 DIAGNOSIS — Z09 POSTOP CHECK: Primary | ICD-10-CM

## 2024-11-08 DIAGNOSIS — A04.72 C. DIFFICILE COLITIS: ICD-10-CM

## 2024-11-08 NOTE — PROGRESS NOTES
Surgery Post-op Progress Note    HPI:  Notes reviewed, and agree with documentation in pt's chart.   Postoperative Follow-up: Patient presents for 2 week follow-up status post robotic incisional hernia repair with mesh (IPOM with [id number] mesh) . Eating a regular diet without difficulty. 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  The patient is not having any pain..     ROS:    10 point review of systems performed; please refer to HPI with pertinent positives, all other ROS are negative    A review of the patient's record including allergies, medication list, tobacco history, family history, problem list, medical history and social history has been completed and updates made to the patient's EMR where indicated.     PE:   CONSTITUTIONAL:  awake and alert    ABDOMEN: soft, non-distended, non-tender     INCISION: clean, dry, healing      ASSESSMENT:   Diagnosis Orders   1. Postop check        2. C. difficile colitis          Overall doing well from the hernia repair.  Still having ongoing issues with C Diff.  She was referred to ID (Dr George) by her PCP, but she is unable to be schedule to see Dr George for another 3-4 weeks.  It seems she might be heading towards needing to discuss a possible fecal transplant for her C Diff, but this should be determined by ID and then she would be referred to GI for this next step    PLAN:    Continue with routine wound care as discussed  Gradually increase activities as tolerated  Follow-up as needed; please call with questions or concerns  I will contact Dr George to see if she might be able to see patient sooner so as to try and resolve her C Diff issues sooner.

## 2024-11-19 ENCOUNTER — OFFICE VISIT (OUTPATIENT)
Dept: INFECTIOUS DISEASES | Age: 84
End: 2024-11-19
Payer: MEDICARE

## 2024-11-19 VITALS
BODY MASS INDEX: 28.34 KG/M2 | HEIGHT: 64 IN | HEART RATE: 65 BPM | SYSTOLIC BLOOD PRESSURE: 136 MMHG | OXYGEN SATURATION: 98 % | TEMPERATURE: 97.8 F | DIASTOLIC BLOOD PRESSURE: 66 MMHG | WEIGHT: 166 LBS

## 2024-11-19 DIAGNOSIS — A04.72 C. DIFFICILE COLITIS: Primary | ICD-10-CM

## 2024-11-19 PROCEDURE — 1159F MED LIST DOCD IN RCRD: CPT | Performed by: INTERNAL MEDICINE

## 2024-11-19 PROCEDURE — 1123F ACP DISCUSS/DSCN MKR DOCD: CPT | Performed by: INTERNAL MEDICINE

## 2024-11-19 PROCEDURE — 99204 OFFICE O/P NEW MOD 45 MIN: CPT | Performed by: INTERNAL MEDICINE

## 2024-11-19 PROCEDURE — 3078F DIAST BP <80 MM HG: CPT | Performed by: INTERNAL MEDICINE

## 2024-11-19 PROCEDURE — 3075F SYST BP GE 130 - 139MM HG: CPT | Performed by: INTERNAL MEDICINE

## 2024-11-19 RX ORDER — FIDAXOMICIN 200 MG/1
200 TABLET, FILM COATED ORAL 2 TIMES DAILY
Qty: 20 TABLET | Refills: 0 | Status: SHIPPED | OUTPATIENT
Start: 2024-11-19 | End: 2024-11-29

## 2024-11-19 RX ORDER — ZINC OXIDE 13 %
1 CREAM (GRAM) TOPICAL DAILY
COMMUNITY

## 2024-11-19 NOTE — PROGRESS NOTES
Infectious Diseases   Consult Note      Reason for Consult:  C diff infection    Requesting Physician:  Alma      Subjective:   CHIEF COMPLAINT:  none given       HPI:    Ailyn Alfaro is an 84yoF with history of DM, CHF, HTN, HLD    She is referred for evaluation of CDI                 Pertinent history,   She was treated for COVID and bacterial sinus infection in early 9/2024, then admitted 9/20-9/23/24 with C diff infection and STONE    She improved with po vanc and was discharged to complete additional 10d course.  GI was involved in her care then.    Following discharge, there was lingering fecal urgency and stools were persistently loose.  Frequency of stools varied, but usually multiple BMs per day and especially after eating something.      On 10/23/24, she had robotic incisional hernia repair   Post-op, she had no BM for 3 days  After that, stools more mucoid, more frequent     Had repeat C diff test positive on 10/31/24   Another 10d course of vanc ending 11/11/24    Since then, stools have been less frequent but always loose   Yesterday, maybe 5-6x/24h  No cramping, nausea, fever  +Gassy     She is referred for management of rec CDI         Past Surgical History:       Diagnosis Date    AR (allergic rhinitis)     Asthma     Cancer (HCC)     bladder    CHF (congestive heart failure) (HCC)     PT STATES NOT AWARE OF THIS DX    Clostridium difficile infection     history    Diabetes mellitus (HCC)     Glaucoma     History of blood transfusion 11/2023    Hyperlipidemia     Hypertension     Incisional hernia     Seasonal allergies     Thyroid disease     Wears glasses          Procedure Laterality Date    CATARACT REMOVAL      CHOLECYSTECTOMY      COLECTOMY N/A 12/07/2022    ROBOTIC SMALL BOWEL RESECTION performed by Saul Hsu MD at [facility] OR    COLONOSCOPY  2002    normal per pt    COLONOSCOPY  2016    normal    CYSTOSCOPY      bladder ca burned off  multiple procedures    ENDOSCOPY, COLON,

## 2024-11-21 RX ORDER — FIDAXOMICIN 200 MG/1
TABLET, FILM COATED ORAL
Qty: 20 TABLET | Refills: 0 | OUTPATIENT
Start: 2024-11-21

## 2024-11-21 NOTE — TELEPHONE ENCOUNTER
Pharmacy stated they were not able to get medication in. Patient's daughter aware and calling to have medication transferred to different pharmacy.

## 2025-08-27 ENCOUNTER — TELEPHONE (OUTPATIENT)
Dept: INFECTIOUS DISEASES | Age: 85
End: 2025-08-27

## 2025-08-28 ENCOUNTER — HOSPITAL ENCOUNTER (OUTPATIENT)
Age: 85
Setting detail: SPECIMEN
Discharge: HOME | End: 2025-08-28
Payer: MEDICARE

## 2025-08-28 LAB — C DIFF TOX A+B STL QL IA: NORMAL

## 2025-08-28 PROCEDURE — 87324 CLOSTRIDIUM AG IA: CPT

## 2025-08-28 PROCEDURE — 87449 NOS EACH ORGANISM AG IA: CPT

## (undated) DEVICE — WOUND RETRACTOR AND PROTECTOR: Brand: ALEXIS WOUND PROTECTOR-RETRACTOR

## (undated) DEVICE — SUTURE ABSORBABLE MONOFILAMENT 2-0 FS 24 CM 26 MM VIO PDS +

## (undated) DEVICE — FENESTRATED BIPOLAR FORCEPS: Brand: ENDOWRIST

## (undated) DEVICE — SEAL

## (undated) DEVICE — LIQUIBAND RAPID ADHESIVE 36/CS 0.8ML: Brand: MEDLINE

## (undated) DEVICE — REDUCER: Brand: ENDOWRIST

## (undated) DEVICE — GLOVE,SURG,SENSICARE SLT,LF,PF,7.5: Brand: MEDLINE

## (undated) DEVICE — SUTURE MONOCRYL STRATAFIX SPRL + SZ 3 0 L8IN ABSRB UD L26MM SH SXMP1B427

## (undated) DEVICE — MEGA SUTURECUT ND: Brand: ENDOWRIST

## (undated) DEVICE — SUTURE PERMAHAND SZ 3-0 L30IN NONABSORBABLE BLK SH L26MM K832H

## (undated) DEVICE — BINDER ABD SM M W9IN FOR 30 45IN 3 PNL E CNTCT CLSR POSTOP

## (undated) DEVICE — SUTURE VICRYL + SZ 0 L27IN ABSRB VLT L26MM UR-6 5/8 CIR VCP603H

## (undated) DEVICE — SUTURE MCRYL + SZ 4-0 L18IN ABSRB UD L19MM PS-2 3/8 CIR MCP496G

## (undated) DEVICE — GOWN,REINF,POLY,AURORA,XLNG/XXL,STRL: Brand: MEDLINE

## (undated) DEVICE — SOLUTION IV IRRIG WATER 1000ML POUR BRL 2F7114

## (undated) DEVICE — SUTURE PERMA-HAND SZ 2-0 L30IN NONABSORBABLE BLK L26MM SH K833H

## (undated) DEVICE — STAPLER 60 RELOAD BLUE: Brand: SUREFORM

## (undated) DEVICE — VESSEL SEALER EXTEND: Brand: ENDOWRIST

## (undated) DEVICE — MEDI-VAC NON-CONDUCTIVE SUCTION TUBING: Brand: CARDINAL HEALTH

## (undated) DEVICE — SUTURE PDS + SZ 0 L27IN ABSRB VLT L26MM CT 2 1 2 CIR PDP334H

## (undated) DEVICE — ARM DRAPE

## (undated) DEVICE — SUTURE VCRL + SZ 0 L27IN ABSRB VLT L26MM UR-6 5/8 CIR VCP603H

## (undated) DEVICE — SUTURE VICRYL + SZ 3-0 L18IN ABSRB UD SH 1/2 CIR TAPERCUT NDL VCP864D

## (undated) DEVICE — SUTURE VCRL + SZ 3-0 L18IN ABSRB UD SH 1/2 CIR TAPERCUT NDL VCP864D

## (undated) DEVICE — PENCIL ES CRD L10FT HND SWCHING ROCK SWCH W/ EDGE COAT BLDE

## (undated) DEVICE — COLUMN DRAPE

## (undated) DEVICE — SUTURE PDS II SZ 0 L60IN ABSRB VLT L48MM CTX 1/2 CIR Z990G

## (undated) DEVICE — STAPLER 60: Brand: SUREFORM

## (undated) DEVICE — AIRSEAL BIFURCATED SMOKE EVAC FILTERED TUBE SET: Brand: AIRSEAL

## (undated) DEVICE — CANNULA SEAL

## (undated) DEVICE — PUMP SUC IRR TBNG L10FT W/ HNDPC ASSEMB STRYKEFLOW 2

## (undated) DEVICE — PMI DISPOSABLE PUNCTURE CLOSURE DEVICE / SUTURE GRASPER: Brand: PMI

## (undated) DEVICE — BLADELESS OBTURATOR: Brand: WECK VISTA

## (undated) DEVICE — SCISSORS SURG DIA8MM MPLR CRV ENDOWRIST

## (undated) DEVICE — TROCAR: Brand: KII FIOS FIRST ENTRY

## (undated) DEVICE — SUTURE PDS II SZ 3-0 L27IN ABSRB CLR SH L26MM 1/2 CIR TAPR Z416H

## (undated) DEVICE — TIP COVER ACCESSORY

## (undated) DEVICE — TUBING, SUCTION, 3/16" X 12', STRAIGHT: Brand: MEDLINE

## (undated) DEVICE — Device

## (undated) DEVICE — SUTURE STRATAFIX SPRL MCRYL + SZ 3 0 L8IN ABSRB UD L26MM SH SXMP1B427

## (undated) DEVICE — INTEGRA® JARIT® KNIGHT NASAL SCISSORS, 6-1/4", 33MM BLADE LENGTH: Brand: INTEGRA® JARIT®

## (undated) DEVICE — SUTURE STRATAFIX SYMMETRIC PDS + SZ 0 L18IN ABSRB VLT CT 2 SXPP1A407